# Patient Record
Sex: MALE | Race: WHITE | HISPANIC OR LATINO | Employment: FULL TIME | URBAN - NONMETROPOLITAN AREA
[De-identification: names, ages, dates, MRNs, and addresses within clinical notes are randomized per-mention and may not be internally consistent; named-entity substitution may affect disease eponyms.]

---

## 2019-12-30 ENCOUNTER — APPOINTMENT (INPATIENT)
Dept: ULTRASOUND IMAGING | Facility: HOSPITAL | Age: 39
DRG: 872 | End: 2019-12-30
Payer: COMMERCIAL

## 2019-12-30 ENCOUNTER — HOSPITAL ENCOUNTER (INPATIENT)
Facility: HOSPITAL | Age: 39
LOS: 4 days | Discharge: HOME | DRG: 872 | End: 2020-01-03
Attending: EMERGENCY MEDICINE | Admitting: FAMILY MEDICINE
Payer: COMMERCIAL

## 2019-12-30 ENCOUNTER — APPOINTMENT (EMERGENCY)
Dept: NON INVASIVE DIAGNOSTICS | Facility: HOSPITAL | Age: 39
DRG: 872 | End: 2019-12-30
Payer: COMMERCIAL

## 2019-12-30 ENCOUNTER — APPOINTMENT (EMERGENCY)
Dept: RADIOLOGY | Facility: HOSPITAL | Age: 39
DRG: 872 | End: 2019-12-30
Payer: COMMERCIAL

## 2019-12-30 DIAGNOSIS — R60.0 LEG EDEMA, RIGHT: ICD-10-CM

## 2019-12-30 DIAGNOSIS — I10 ESSENTIAL HYPERTENSION: ICD-10-CM

## 2019-12-30 DIAGNOSIS — L03.115 CELLULITIS OF RIGHT LOWER EXTREMITY: Primary | ICD-10-CM

## 2019-12-30 PROBLEM — E87.6 HYPOKALEMIA: Status: ACTIVE | Noted: 2019-12-30

## 2019-12-30 PROBLEM — R17 ELEVATED BILIRUBIN: Status: ACTIVE | Noted: 2019-12-30

## 2019-12-30 PROBLEM — A41.9 SEPSIS (HCC): Status: ACTIVE | Noted: 2019-12-30

## 2019-12-30 PROBLEM — E66.9 SUPER OBESITY: Status: ACTIVE | Noted: 2019-12-30

## 2019-12-30 PROBLEM — L03.90 SEPSIS DUE TO CELLULITIS (HCC): Status: ACTIVE | Noted: 2019-12-30

## 2019-12-30 LAB
ALBUMIN SERPL BCP-MCNC: 3.1 G/DL (ref 3.5–5)
ALP SERPL-CCNC: 110 U/L (ref 46–116)
ALT SERPL W P-5'-P-CCNC: 41 U/L (ref 12–78)
ANION GAP SERPL CALCULATED.3IONS-SCNC: 6 MMOL/L (ref 4–13)
AST SERPL W P-5'-P-CCNC: 27 U/L (ref 5–45)
BASOPHILS # BLD AUTO: 0.02 THOUSANDS/ΜL (ref 0–0.1)
BASOPHILS NFR BLD AUTO: 0 % (ref 0–1)
BILIRUB SERPL-MCNC: 1.33 MG/DL (ref 0.2–1)
BUN SERPL-MCNC: 12 MG/DL (ref 5–25)
CALCIUM SERPL-MCNC: 8.8 MG/DL (ref 8.3–10.1)
CHLORIDE SERPL-SCNC: 102 MMOL/L (ref 100–108)
CK SERPL-CCNC: 108 U/L (ref 39–308)
CO2 SERPL-SCNC: 29 MMOL/L (ref 21–32)
CREAT SERPL-MCNC: 1.04 MG/DL (ref 0.6–1.3)
EOSINOPHIL # BLD AUTO: 0.03 THOUSAND/ΜL (ref 0–0.61)
EOSINOPHIL NFR BLD AUTO: 0 % (ref 0–6)
ERYTHROCYTE [DISTWIDTH] IN BLOOD BY AUTOMATED COUNT: 12.6 % (ref 11.6–15.1)
GFR SERPL CREATININE-BSD FRML MDRD: 89 ML/MIN/1.73SQ M
GLUCOSE SERPL-MCNC: 92 MG/DL (ref 65–140)
HCT VFR BLD AUTO: 43.6 % (ref 36.5–49.3)
HGB BLD-MCNC: 14.9 G/DL (ref 12–17)
IMM GRANULOCYTES # BLD AUTO: 0.05 THOUSAND/UL (ref 0–0.2)
IMM GRANULOCYTES NFR BLD AUTO: 1 % (ref 0–2)
LACTATE SERPL-SCNC: 1.1 MMOL/L (ref 0.5–2)
LYMPHOCYTES # BLD AUTO: 1.87 THOUSANDS/ΜL (ref 0.6–4.47)
LYMPHOCYTES NFR BLD AUTO: 17 % (ref 14–44)
MCH RBC QN AUTO: 28.7 PG (ref 26.8–34.3)
MCHC RBC AUTO-ENTMCNC: 34.2 G/DL (ref 31.4–37.4)
MCV RBC AUTO: 84 FL (ref 82–98)
MONOCYTES # BLD AUTO: 1.05 THOUSAND/ΜL (ref 0.17–1.22)
MONOCYTES NFR BLD AUTO: 10 % (ref 4–12)
NEUTROPHILS # BLD AUTO: 8.08 THOUSANDS/ΜL (ref 1.85–7.62)
NEUTS SEG NFR BLD AUTO: 72 % (ref 43–75)
NRBC BLD AUTO-RTO: 0 /100 WBCS
PLATELET # BLD AUTO: 221 THOUSANDS/UL (ref 149–390)
PMV BLD AUTO: 11.5 FL (ref 8.9–12.7)
POTASSIUM SERPL-SCNC: 3.3 MMOL/L (ref 3.5–5.3)
PROT SERPL-MCNC: 8 G/DL (ref 6.4–8.2)
RBC # BLD AUTO: 5.2 MILLION/UL (ref 3.88–5.62)
SODIUM SERPL-SCNC: 137 MMOL/L (ref 136–145)
WBC # BLD AUTO: 11.1 THOUSAND/UL (ref 4.31–10.16)

## 2019-12-30 PROCEDURE — 73590 X-RAY EXAM OF LOWER LEG: CPT

## 2019-12-30 PROCEDURE — 87070 CULTURE OTHR SPECIMN AEROBIC: CPT | Performed by: PHYSICIAN ASSISTANT

## 2019-12-30 PROCEDURE — 83605 ASSAY OF LACTIC ACID: CPT | Performed by: FAMILY MEDICINE

## 2019-12-30 PROCEDURE — 80074 ACUTE HEPATITIS PANEL: CPT | Performed by: FAMILY MEDICINE

## 2019-12-30 PROCEDURE — 96365 THER/PROPH/DIAG IV INF INIT: CPT

## 2019-12-30 PROCEDURE — 82550 ASSAY OF CK (CPK): CPT | Performed by: FAMILY MEDICINE

## 2019-12-30 PROCEDURE — 36415 COLL VENOUS BLD VENIPUNCTURE: CPT | Performed by: PHYSICIAN ASSISTANT

## 2019-12-30 PROCEDURE — 99223 1ST HOSP IP/OBS HIGH 75: CPT | Performed by: FAMILY MEDICINE

## 2019-12-30 PROCEDURE — 90471 IMMUNIZATION ADMIN: CPT | Performed by: FAMILY MEDICINE

## 2019-12-30 PROCEDURE — 99285 EMERGENCY DEPT VISIT HI MDM: CPT | Performed by: PHYSICIAN ASSISTANT

## 2019-12-30 PROCEDURE — 87205 SMEAR GRAM STAIN: CPT | Performed by: PHYSICIAN ASSISTANT

## 2019-12-30 PROCEDURE — 93971 EXTREMITY STUDY: CPT

## 2019-12-30 PROCEDURE — 90686 IIV4 VACC NO PRSV 0.5 ML IM: CPT | Performed by: FAMILY MEDICINE

## 2019-12-30 PROCEDURE — 99285 EMERGENCY DEPT VISIT HI MDM: CPT

## 2019-12-30 PROCEDURE — 85025 COMPLETE CBC W/AUTO DIFF WBC: CPT | Performed by: PHYSICIAN ASSISTANT

## 2019-12-30 PROCEDURE — 80053 COMPREHEN METABOLIC PANEL: CPT | Performed by: PHYSICIAN ASSISTANT

## 2019-12-30 PROCEDURE — 76705 ECHO EXAM OF ABDOMEN: CPT

## 2019-12-30 PROCEDURE — 93971 EXTREMITY STUDY: CPT | Performed by: SURGERY

## 2019-12-30 RX ORDER — SENNOSIDES 8.6 MG
1 TABLET ORAL DAILY
Status: DISCONTINUED | OUTPATIENT
Start: 2019-12-31 | End: 2020-01-03 | Stop reason: HOSPADM

## 2019-12-30 RX ORDER — HYDROCHLOROTHIAZIDE 25 MG/1
25 TABLET ORAL DAILY
Status: DISCONTINUED | OUTPATIENT
Start: 2019-12-31 | End: 2020-01-03 | Stop reason: HOSPADM

## 2019-12-30 RX ORDER — CALCIUM CARBONATE 200(500)MG
1000 TABLET,CHEWABLE ORAL DAILY PRN
Status: DISCONTINUED | OUTPATIENT
Start: 2019-12-30 | End: 2020-01-03 | Stop reason: HOSPADM

## 2019-12-30 RX ORDER — CLONIDINE HYDROCHLORIDE 0.1 MG/1
0.2 TABLET ORAL ONCE
Status: COMPLETED | OUTPATIENT
Start: 2019-12-30 | End: 2019-12-30

## 2019-12-30 RX ORDER — POTASSIUM CHLORIDE 20 MEQ/1
20 TABLET, EXTENDED RELEASE ORAL ONCE
Status: COMPLETED | OUTPATIENT
Start: 2019-12-30 | End: 2019-12-30

## 2019-12-30 RX ORDER — ACETAMINOPHEN 325 MG/1
650 TABLET ORAL EVERY 6 HOURS PRN
Status: DISCONTINUED | OUTPATIENT
Start: 2019-12-30 | End: 2019-12-31

## 2019-12-30 RX ORDER — ONDANSETRON 2 MG/ML
4 INJECTION INTRAMUSCULAR; INTRAVENOUS EVERY 6 HOURS PRN
Status: DISCONTINUED | OUTPATIENT
Start: 2019-12-30 | End: 2020-01-03 | Stop reason: HOSPADM

## 2019-12-30 RX ORDER — CEPHALEXIN 500 MG/1
500 CAPSULE ORAL 4 TIMES DAILY
Status: ON HOLD | COMMUNITY
Start: 2019-12-28 | End: 2020-01-03 | Stop reason: SDUPTHER

## 2019-12-30 RX ORDER — DOCUSATE SODIUM 100 MG/1
100 CAPSULE, LIQUID FILLED ORAL 2 TIMES DAILY
Status: DISCONTINUED | OUTPATIENT
Start: 2019-12-30 | End: 2020-01-03 | Stop reason: HOSPADM

## 2019-12-30 RX ADMIN — INFLUENZA VIRUS VACCINE 0.5 ML: 15; 15; 15; 15 SUSPENSION INTRAMUSCULAR at 21:01

## 2019-12-30 RX ADMIN — VANCOMYCIN HYDROCHLORIDE 1500 MG: 5 INJECTION, POWDER, LYOPHILIZED, FOR SOLUTION INTRAVENOUS at 13:42

## 2019-12-30 RX ADMIN — POTASSIUM CHLORIDE 20 MEQ: 1500 TABLET, EXTENDED RELEASE ORAL at 15:33

## 2019-12-30 RX ADMIN — CLONIDINE HYDROCHLORIDE 0.2 MG: 0.1 TABLET ORAL at 19:31

## 2019-12-30 NOTE — ASSESSMENT & PLAN NOTE
Outpatient treatment failed with Keflex  Progressing quickly  Continue vancomycin  Follow-up pharmacy consult for vanc trough  Follow-up blood culture, wound culture  WBC is minimally elevated  Follow lactic acid, CPK level  Follow wound care management

## 2019-12-30 NOTE — ED PROVIDER NOTES
History  Chief Complaint   Patient presents with    Cellulitis     Pt states since friday he noticed R sided calf pain/redness/swelling, pt had N/V/D and went to [facility] where the pt tested negative for flu however was dx with cellulitis in the R calf and was perscribed abx  Pt states he has been taking abx however no improvement  69-year-old male with a history of hypertension presents to the emergency department for evaluation of right lower extremity swelling and pain  The patient reports that his symptoms began over the past 3-4 days  He was seen in urgent care on Saturday for flu-like symptoms and was also noted to have a area of redness on the right lower extremity  They placed him on Keflex for suspected cellulitis and sent him home  The patient returns to the ED today stating that the symptoms have only worsened  He reports worsening redness and pain of the entire lower leg, and has also noticed some drainage as well  He reports having a fever this morning of 102 F, and he did take Motrin prior to coming to the emergency department  He denies any nausea, vomiting, abdominal pain, chills, night sweats, headaches, chest pain, shortness of breath, or any other acute complaints  He denies any recent travel or surgeries, and has no history of blood clots  He denies any injuries or falls prior to the onset of his symptoms  He has no other questions or concerns at this time  Prior to Admission Medications   Prescriptions Last Dose Informant Patient Reported? Taking? cephalexin (KEFLEX) 500 mg capsule 12/30/2019 at Unknown time  Yes Yes   Sig: Take 500 mg by mouth 4 (four) times a day      Facility-Administered Medications: None       Past Medical History:   Diagnosis Date    Hypertension        No past surgical history on file  No family history on file  I have reviewed and agree with the history as documented      Social History     Tobacco Use    Smoking status: Never Smoker  Smokeless tobacco: Never Used   Substance Use Topics    Alcohol use: Not Currently    Drug use: Not Currently        Review of Systems   Constitutional: Positive for fever  Negative for chills  HENT: Negative for congestion and sore throat  Eyes: Negative for photophobia and visual disturbance  Respiratory: Negative for cough and shortness of breath  Cardiovascular: Positive for leg swelling  Negative for chest pain and palpitations  Gastrointestinal: Negative for abdominal pain, nausea and vomiting  Genitourinary: Negative for difficulty urinating, dysuria and hematuria  Musculoskeletal: Negative for arthralgias and back pain  Skin: Positive for color change  Negative for rash and wound  Neurological: Negative for light-headedness and headaches  All other systems reviewed and are negative  Physical Exam  Physical Exam   Constitutional: He appears well-developed and well-nourished  He appears distressed (Mild distress)  HENT:   Head: Normocephalic and atraumatic  Mouth/Throat: Oropharynx is clear and moist    Eyes: Pupils are equal, round, and reactive to light  EOM are normal    Neck: Normal range of motion  Neck supple  Cardiovascular: Normal rate and regular rhythm  No murmur heard  Pulmonary/Chest: Effort normal and breath sounds normal  He has no wheezes  He has no rhonchi  He has no rales  Abdominal: Soft  Normal appearance and bowel sounds are normal  There is no tenderness  Musculoskeletal: Normal range of motion  He exhibits no deformity  Neurological: He is alert  No cranial nerve deficit  Skin: Skin is warm and dry  Capillary refill takes less than 2 seconds  There is a dusky red discoloration of the majority of the right lower extremity between the ankle and knee  There are 2 blisters draining serosanguineous fluid  There is associated erythema consistent with cellulitis, which is warm to touch extending up the medial thigh  Pedal pulses are 2+  Sensation intact  Psychiatric: He has a normal mood and affect  His behavior is normal        Vital Signs  ED Triage Vitals [12/30/19 1159]   Temperature Pulse Respirations Blood Pressure SpO2   97 8 °F (36 6 °C) (!) 107 18 (!) 189/108 98 %      Temp Source Heart Rate Source Patient Position - Orthostatic VS BP Location FiO2 (%)   Oral Monitor Lying Right arm --      Pain Score       Worst Possible Pain           Vitals:    12/30/19 1230 12/30/19 1330 12/30/19 1430 12/30/19 1706   BP: (!) 172/98 (!) 178/100 (!) 179/102 151/94   Pulse: 92 97 91 98   Patient Position - Orthostatic VS: Lying Lying Lying          Visual Acuity      ED Medications  Medications   vancomycin (VANCOCIN) 1,500 mg in sodium chloride 0 9 % 250 mL IVPB (0 mg Intravenous Stopped 12/30/19 1555)   potassium chloride (K-DUR,KLOR-CON) CR tablet 20 mEq (20 mEq Oral Given 12/30/19 1533)       Diagnostic Studies  Results Reviewed     Procedure Component Value Units Date/Time    Lactic acid, plasma [[id number]]  (Normal) Collected:  12/30/19 1541    Lab Status:  Final result Specimen:  Blood from Arm, Left Updated:  12/30/19 1609     LACTIC ACID 1 1 mmol/L     Narrative:       Result may be elevated if tourniquet was used during collection  Anaerobic culture and Gram stain [[id number]] Collected:  12/30/19 1543    Lab Status: In process Specimen:  Tissue from Wound Updated:  12/30/19 1547    Wound culture and Gram stain [[id number]] Collected:  12/30/19 1543    Lab Status: In process Specimen:  Wound from Leg, Right Updated:  12/30/19 1547    Hepatitis panel, acute [[id number]] Collected:  12/30/19 1541    Lab Status:   In process Specimen:  Blood from Arm, Left Updated:  12/30/19 1546    CK (with reflex to MB) [[id number]]  (Normal) Collected:  12/30/19 1341    Lab Status:  Final result Specimen:  Blood from Arm, Left Updated:  12/30/19 1516     Total  U/L     Comprehensive metabolic panel [[id number]]  (Abnormal) Collected:  12/30/19 1341    Lab Status:  Final result Specimen:  Blood from Arm, Left Updated:  12/30/19 1405     Sodium 137 mmol/L      Potassium 3 3 mmol/L      Chloride 102 mmol/L      CO2 29 mmol/L      ANION GAP 6 mmol/L      BUN 12 mg/dL      Creatinine 1 04 mg/dL      Glucose 92 mg/dL      Calcium 8 8 mg/dL      AST 27 U/L      ALT 41 U/L      Alkaline Phosphatase 110 U/L      Total Protein 8 0 g/dL      Albumin 3 1 g/dL      Total Bilirubin 1 33 mg/dL      eGFR 89 ml/min/1 73sq m     Narrative:       National Kidney Disease Foundation guidelines for Chronic Kidney Disease (CKD):     Stage 1 with normal or high GFR (GFR > 90 mL/min/1 73 square meters)    Stage 2 Mild CKD (GFR = 60-89 mL/min/1 73 square meters)    Stage 3A Moderate CKD (GFR = 45-59 mL/min/1 73 square meters)    Stage 3B Moderate CKD (GFR = 30-44 mL/min/1 73 square meters)    Stage 4 Severe CKD (GFR = 15-29 mL/min/1 73 square meters)    Stage 5 End Stage CKD (GFR <15 mL/min/1 73 square meters)  Note: GFR calculation is accurate only with a steady state creatinine    CBC and differential [[id number]]  (Abnormal) Collected:  12/30/19 1341    Lab Status:  Final result Specimen:  Blood from Arm, Left Updated:  12/30/19 1347     WBC 11 10 Thousand/uL      RBC 5 20 Million/uL      Hemoglobin 14 9 g/dL      Hematocrit 43 6 %      MCV 84 fL      MCH 28 7 pg      MCHC 34 2 g/dL      RDW 12 6 %      MPV 11 5 fL      Platelets 703 Thousands/uL      nRBC 0 /100 WBCs      Neutrophils Relative 72 %      Immat GRANS % 1 %      Lymphocytes Relative 17 %      Monocytes Relative 10 %      Eosinophils Relative 0 %      Basophils Relative 0 %      Neutrophils Absolute 8 08 Thousands/µL      Immature Grans Absolute 0 05 Thousand/uL      Lymphocytes Absolute 1 87 Thousands/µL      Monocytes Absolute 1 05 Thousand/µL      Eosinophils Absolute 0 03 Thousand/µL      Basophils Absolute 0 02 Thousands/µL                  US abdomen limited   Final Result by Ilya Person MD (12/30 1730)   1  Hepatic steatosis  2   Normal gallbladder  Workstation performed: [id number]         VAS lower limb venous duplex study, unilateral/limited   Final Result by Hugh Johnson MD (12/30 0445)      XR tibia fibula 2 views RIGHT   Final Result by Jaziel Ashby MD (12/30 1526)      No acute osseous abnormality  Workstation performed: [id number]                    Procedures  Procedures         ED Course  ED Course as of Dec 30 1819   Mon Dec 30, 2019   1 19-year-old male presents with the above HPI  He was placed on Keflex 2 days ago for suspected cellulitis  His lower extremity has a dusky red appearance and there are blisters with serosanguineous drainage  Symptoms were concerning for possible underlying DVT versus fracture  Ultrasound however was negative  X-ray was negative for any fracture and there was no free air visible  Waiting on labs  1331 The concern at this time is cellulitis that failed outpatient therapy with Keflex p o  Waiting on labs and will plan for admission or IV antibiotics      1407 Mild leukocytosis on CBC  CMP is grossly unremarkable  Hollister text sent to the hospitalist for admission for cellulitis of the lower extremity that failed outpatient treatment      [id number] will evaluate the patient       [phone] in agreement with admission  Patient in agreement with this plan as well    Patient admitted to the hospitalist service for IV antibiotics                                  MDM  Number of Diagnoses or Management Options  Cellulitis of right lower extremity: new and requires workup     Amount and/or Complexity of Data Reviewed  Clinical lab tests: ordered and reviewed  Tests in the radiology section of CPT®: ordered and reviewed  Tests in the medicine section of CPT®: ordered and reviewed  Discussion of test results with the performing providers: yes  Decide to obtain previous medical records or to obtain history from someone other than the patient: yes  Obtain history from someone other than the patient: yes  Review and summarize past medical records: yes  Independent visualization of images, tracings, or specimens: yes    Risk of Complications, Morbidity, and/or Mortality  Presenting problems: moderate  Diagnostic procedures: low  Management options: low    Patient Progress  Patient progress: stable        Disposition  Final diagnoses:   Cellulitis of right lower extremity     Time reflects when diagnosis was documented in both MDM as applicable and the Disposition within this note     Time User Action Codes Description Comment    12/30/2019  1:07 PM Lina Nunez Add [R60 0] Leg edema, right     12/30/2019  2:32 PM Liseth Thao Add [L03 115] Cellulitis of right lower extremity       ED Disposition     ED Disposition Condition Date/Time Comment    Admit Stable Mon Dec 30, 2019  2:31 PM Case was discussed with Dr Estefani Mcginnis and the patient's admission status was agreed to be Admission Status: inpatient status to the service of Dr Estefani Mcginnis          Follow-up Information    None         Patient's Medications   Discharge Prescriptions    No medications on file     No discharge procedures on file      ED Provider  Electronically Signed by           Libra Pruitt PA-C  12/30/19 0285

## 2019-12-30 NOTE — H&P
H&P- Sue Ahumada 1980, 35 y o  male MRN: [id number]    Unit/Bed#: ED 12 Encounter: [id number]    Primary Care Provider: No primary care provider on file  Date and time admitted to hospital: 12/30/2019 11:54 AM        * Cellulitis of right lower extremity  Assessment & Plan  Outpatient treatment failed with Keflex  Progressing quickly  Continue vancomycin  Follow-up pharmacy consult for vanc trough  Follow-up blood culture, wound culture  WBC is minimally elevated  Follow lactic acid, CPK level  Follow wound care management    Super obesity  Assessment & Plan  Low fat low salt diet  Nutritional consult    Essential hypertension  Assessment & Plan  Uncontrol  Asymptomatic  Patient received One dose Clonidine at ER  Continue HCTZ  Low sodium diet    Elevated bilirubin  Assessment & Plan  Unknown etiology  Follow Hepatitis panel  Follow US abdomen      Hypokalemia  Assessment & Plan  Potassium 3 3  Will supplement  Follow am Labs    Sepsis due to cellulitis   Elevated wbc   Rise of temperature at home   Continue antibiotics      VTE Prophylaxis: Enoxaparin (Lovenox)  / sequential compression device   Code Status: Full code    Discussion with family: Wife -Janell    Anticipated Length of Stay:  Patient will be admitted on an Inpatient basis with an anticipated length of stay of  > 2 midnights  Justification for Hospital Stay: cellulitis requiring IV antibiotics    Total Time for Visit, including Counseling / Coordination of Care: 45 minutes  Greater than 50% of this total time spent on direct patient counseling and coordination of care  Chief Complaint:   Leg pain, swelling and fever    History of Present Illness:    Sue Ahumada is a 35 y o  male who presents with leg pain, swelling and redness  Complaining this symptoms started few days ago, went to see urgent care prescribed Keflex  Patient reports after taking few medication for last 2-3 days is not helping rather is progressing    Patient reports the redness is getting worse, later on he noticed a blister on leg which is wheezing  Also had fever 102 at home  Denies any nausea, vomiting, constipation  Review of Systems:    Review of Systems   Constitutional: Positive for activity change and fever  Negative for chills, diaphoresis and unexpected weight change  HENT: Negative for congestion, postnasal drip, rhinorrhea, sinus pressure, sinus pain, sneezing, sore throat, tinnitus and trouble swallowing  Eyes: Negative for photophobia, redness and visual disturbance  Respiratory: Negative for apnea, cough, choking, shortness of breath, wheezing and stridor  Cardiovascular: Negative for chest pain, palpitations and leg swelling  Gastrointestinal: Negative for abdominal distention, abdominal pain, anal bleeding, constipation, diarrhea and nausea  Genitourinary: Negative for difficulty urinating, dysuria, enuresis, frequency and hematuria  Musculoskeletal: Positive for myalgias  Negative for arthralgias, back pain, gait problem, joint swelling and neck stiffness  Skin: Positive for color change, rash and wound  Neurological: Negative for dizziness, tremors, seizures, syncope, facial asymmetry, speech difficulty, light-headedness, numbness and headaches  Hematological: Negative for adenopathy  Psychiatric/Behavioral: Negative for agitation, behavioral problems, decreased concentration and dysphoric mood  All other systems reviewed and are negative  Past Medical and Surgical History:     Past Medical History:   Diagnosis Date    Hypertension        No past surgical history on file  Meds/Allergies:    Prior to Admission medications    Medication Sig Start Date End Date Taking? Authorizing Provider   cephalexin (KEFLEX) 500 mg capsule Take 500 mg by mouth 4 (four) times a day 12/28/19 1/7/20 Yes Historical Provider, MD     I have reviewed home medications with patient personally      Allergies: No Known Allergies    Social History:     Marital Status: Single   Occupation: employed  Patient Pre-hospital Living Situation: home  Patient Pre-hospital Level of Mobility: independent  Patient Pre-hospital Diet Restrictions: none  Substance Use History:   Social History     Substance and Sexual Activity   Alcohol Use Not Currently     Social History     Tobacco Use   Smoking Status Never Smoker   Smokeless Tobacco Never Used     Social History     Substance and Sexual Activity   Drug Use Not Currently       Family History:    Father: HTN,Heart disease, Mother=Diabetes    Physical Exam:     Vitals:   Blood Pressure: (!) 179/102 (12/30/19 1430)  Pulse: 91 (12/30/19 1430)  Temperature: 97 8 °F (36 6 °C) (12/30/19 1159)  Temp Source: Oral (12/30/19 1159)  Respirations: 18 (12/30/19 1430)  Height: 5' 8" (172 7 cm) (12/30/19 1159)  Weight - Scale: (!) 158 kg (348 lb 1 7 oz) (12/30/19 1159)  SpO2: 99 % (12/30/19 1430)    Physical Exam   Constitutional: He is oriented to person, place, and time  He appears well-developed  No distress  HENT:   Mouth/Throat: No oropharyngeal exudate  Eyes: Pupils are equal, round, and reactive to light  EOM are normal    Neck: Normal range of motion  No JVD present  No tracheal deviation present  No thyromegaly present  Cardiovascular: Normal rate, regular rhythm and normal heart sounds  Exam reveals no gallop and no friction rub  No murmur heard  Pulmonary/Chest: Effort normal and breath sounds normal  No stridor  No respiratory distress  He has no rales  He exhibits no tenderness  Abdominal: Soft  Bowel sounds are normal  He exhibits no distension and no mass  There is no rebound and no guarding  No hernia  Musculoskeletal: Normal range of motion  He exhibits edema (right leg) and tenderness  Legs:  Neurological: He is alert and oriented to person, place, and time  He displays normal reflexes  No cranial nerve deficit  He exhibits normal muscle tone  Coordination normal    Skin: Skin is warm  Capillary refill takes less than 2 seconds  There is erythema  Psychiatric: He has a normal mood and affect  His behavior is normal    Nursing note and vitals reviewed  Additional Data:     Lab Results: I have personally reviewed pertinent reports  Results from last 7 days   Lab Units 12/30/19  1341   WBC Thousand/uL 11 10*   HEMOGLOBIN g/dL 14 9   HEMATOCRIT % 43 6   PLATELETS Thousands/uL 221   NEUTROS PCT % 72   LYMPHS PCT % 17   MONOS PCT % 10   EOS PCT % 0     Results from last 7 days   Lab Units 12/30/19  1341   SODIUM mmol/L 137   POTASSIUM mmol/L 3 3*   CHLORIDE mmol/L 102   CO2 mmol/L 29   BUN mg/dL 12   CREATININE mg/dL 1 04   ANION GAP mmol/L 6   CALCIUM mg/dL 8 8   ALBUMIN g/dL 3 1*   TOTAL BILIRUBIN mg/dL 1 33*   ALK PHOS U/L 110   ALT U/L 41   AST U/L 27   GLUCOSE RANDOM mg/dL 92                       Imaging: I have personally reviewed pertinent reports  XR tibia fibula 2 views RIGHT    (Results Pending)   VAS lower limb venous duplex study, unilateral/limited    (Results Pending)       EKG, Pathology, and Other Studies Reviewed on Admission:   · EKG: reviewed    Allscripts / Epic Records Reviewed: Yes     ** Please Note: This note has been constructed using a voice recognition system   ** None

## 2019-12-30 NOTE — LETTER
[facility]  [address]  [facility] [address]  Dept: [phone]    January 3, 2020     Patient: Brianna Rodriguez   YOB: 1980   Date of Visit: 12/30/2019       To Whom it May Concern:    Brianna Rodriguez is under my professional care  He was seen in the hospital from 12/30/2019   to 01/03/20  He may return to work on 1/6/2020 without limitations  If you have any questions or concerns, please don't hesitate to call           Sincerely,          Dela Rubinstein, MD

## 2019-12-31 LAB
ALBUMIN SERPL BCP-MCNC: 2.5 G/DL (ref 3.5–5)
ALP SERPL-CCNC: 92 U/L (ref 46–116)
ALT SERPL W P-5'-P-CCNC: 32 U/L (ref 12–78)
ANION GAP SERPL CALCULATED.3IONS-SCNC: 8 MMOL/L (ref 4–13)
AST SERPL W P-5'-P-CCNC: 20 U/L (ref 5–45)
BASOPHILS # BLD AUTO: 0.03 THOUSANDS/ΜL (ref 0–0.1)
BASOPHILS NFR BLD AUTO: 0 % (ref 0–1)
BILIRUB SERPL-MCNC: 1.01 MG/DL (ref 0.2–1)
BUN SERPL-MCNC: 11 MG/DL (ref 5–25)
CALCIUM SERPL-MCNC: 8.2 MG/DL (ref 8.3–10.1)
CHLORIDE SERPL-SCNC: 103 MMOL/L (ref 100–108)
CO2 SERPL-SCNC: 26 MMOL/L (ref 21–32)
CREAT SERPL-MCNC: 0.87 MG/DL (ref 0.6–1.3)
EOSINOPHIL # BLD AUTO: 0.19 THOUSAND/ΜL (ref 0–0.61)
EOSINOPHIL NFR BLD AUTO: 2 % (ref 0–6)
ERYTHROCYTE [DISTWIDTH] IN BLOOD BY AUTOMATED COUNT: 12.6 % (ref 11.6–15.1)
GFR SERPL CREATININE-BSD FRML MDRD: 108 ML/MIN/1.73SQ M
GLUCOSE SERPL-MCNC: 96 MG/DL (ref 65–140)
HAV IGM SER QL: NORMAL
HBV CORE IGM SER QL: NORMAL
HBV SURFACE AG SER QL: NORMAL
HCT VFR BLD AUTO: 39.4 % (ref 36.5–49.3)
HCV AB SER QL: NORMAL
HGB BLD-MCNC: 13.4 G/DL (ref 12–17)
IMM GRANULOCYTES # BLD AUTO: 0.04 THOUSAND/UL (ref 0–0.2)
IMM GRANULOCYTES NFR BLD AUTO: 0 % (ref 0–2)
LYMPHOCYTES # BLD AUTO: 2.48 THOUSANDS/ΜL (ref 0.6–4.47)
LYMPHOCYTES NFR BLD AUTO: 24 % (ref 14–44)
MCH RBC QN AUTO: 28.8 PG (ref 26.8–34.3)
MCHC RBC AUTO-ENTMCNC: 34 G/DL (ref 31.4–37.4)
MCV RBC AUTO: 85 FL (ref 82–98)
MONOCYTES # BLD AUTO: 1.06 THOUSAND/ΜL (ref 0.17–1.22)
MONOCYTES NFR BLD AUTO: 10 % (ref 4–12)
NEUTROPHILS # BLD AUTO: 6.38 THOUSANDS/ΜL (ref 1.85–7.62)
NEUTS SEG NFR BLD AUTO: 64 % (ref 43–75)
NRBC BLD AUTO-RTO: 0 /100 WBCS
PLATELET # BLD AUTO: 229 THOUSANDS/UL (ref 149–390)
PMV BLD AUTO: 10.9 FL (ref 8.9–12.7)
POTASSIUM SERPL-SCNC: 3.5 MMOL/L (ref 3.5–5.3)
PROT SERPL-MCNC: 6.9 G/DL (ref 6.4–8.2)
RBC # BLD AUTO: 4.65 MILLION/UL (ref 3.88–5.62)
SODIUM SERPL-SCNC: 137 MMOL/L (ref 136–145)
WBC # BLD AUTO: 10.18 THOUSAND/UL (ref 4.31–10.16)

## 2019-12-31 PROCEDURE — 99232 SBSQ HOSP IP/OBS MODERATE 35: CPT | Performed by: FAMILY MEDICINE

## 2019-12-31 PROCEDURE — G8987 SELF CARE CURRENT STATUS: HCPCS

## 2019-12-31 PROCEDURE — G8980 MOBILITY D/C STATUS: HCPCS

## 2019-12-31 PROCEDURE — 85025 COMPLETE CBC W/AUTO DIFF WBC: CPT | Performed by: FAMILY MEDICINE

## 2019-12-31 PROCEDURE — G8979 MOBILITY GOAL STATUS: HCPCS

## 2019-12-31 PROCEDURE — 97162 PT EVAL MOD COMPLEX 30 MIN: CPT

## 2019-12-31 PROCEDURE — G8989 SELF CARE D/C STATUS: HCPCS

## 2019-12-31 PROCEDURE — G8978 MOBILITY CURRENT STATUS: HCPCS

## 2019-12-31 PROCEDURE — G8988 SELF CARE GOAL STATUS: HCPCS

## 2019-12-31 PROCEDURE — 97166 OT EVAL MOD COMPLEX 45 MIN: CPT

## 2019-12-31 PROCEDURE — 80053 COMPREHEN METABOLIC PANEL: CPT | Performed by: FAMILY MEDICINE

## 2019-12-31 RX ORDER — OXYCODONE HYDROCHLORIDE 10 MG/1
10 TABLET ORAL EVERY 4 HOURS PRN
Status: DISCONTINUED | OUTPATIENT
Start: 2019-12-31 | End: 2020-01-03 | Stop reason: HOSPADM

## 2019-12-31 RX ORDER — OXYCODONE HYDROCHLORIDE 5 MG/1
5 TABLET ORAL EVERY 4 HOURS PRN
Status: DISCONTINUED | OUTPATIENT
Start: 2019-12-31 | End: 2020-01-03 | Stop reason: HOSPADM

## 2019-12-31 RX ORDER — ACETAMINOPHEN 325 MG/1
650 TABLET ORAL EVERY 6 HOURS PRN
Status: DISCONTINUED | OUTPATIENT
Start: 2019-12-31 | End: 2020-01-03 | Stop reason: HOSPADM

## 2019-12-31 RX ORDER — HYDROMORPHONE HCL/PF 1 MG/ML
0.5 SYRINGE (ML) INJECTION
Status: DISCONTINUED | OUTPATIENT
Start: 2019-12-31 | End: 2020-01-03 | Stop reason: HOSPADM

## 2019-12-31 RX ADMIN — VANCOMYCIN HYDROCHLORIDE 1500 MG: 1 INJECTION, POWDER, LYOPHILIZED, FOR SOLUTION INTRAVENOUS at 02:11

## 2019-12-31 RX ADMIN — OXYCODONE HYDROCHLORIDE 10 MG: 10 TABLET ORAL at 18:23

## 2019-12-31 RX ADMIN — VANCOMYCIN HYDROCHLORIDE 1500 MG: 1 INJECTION, POWDER, LYOPHILIZED, FOR SOLUTION INTRAVENOUS at 19:20

## 2019-12-31 RX ADMIN — HYDROCHLOROTHIAZIDE 25 MG: 25 TABLET ORAL at 09:08

## 2019-12-31 RX ADMIN — OXYCODONE HYDROCHLORIDE 10 MG: 10 TABLET ORAL at 06:36

## 2019-12-31 RX ADMIN — VANCOMYCIN HYDROCHLORIDE 1500 MG: 1 INJECTION, POWDER, LYOPHILIZED, FOR SOLUTION INTRAVENOUS at 11:36

## 2019-12-31 RX ADMIN — OXYCODONE HYDROCHLORIDE 5 MG: 5 TABLET ORAL at 11:39

## 2019-12-31 RX ADMIN — ENOXAPARIN SODIUM 40 MG: 40 INJECTION SUBCUTANEOUS at 09:08

## 2019-12-31 RX ADMIN — ACETAMINOPHEN 650 MG: 325 TABLET ORAL at 16:26

## 2019-12-31 NOTE — UTILIZATION REVIEW
Initial Clinical Review    Admission: Date/Time/Statement: Inpatient Admission Orders (From admission, onward)     Ordered        12/30/19 1432  Inpatient Admission  Once                   Orders Placed This Encounter   Procedures    Inpatient Admission     Standing Status:   Standing     Number of Occurrences:   1     Order Specific Question:   Admitting Physician     Answer:   Simon Estevez [[id number]]     Order Specific Question:   Level of Care     Answer:   Med Surg [[id number]]     Order Specific Question:   Estimated length of stay     Answer:   More than 2 Midnights     Order Specific Question:   Certification     Answer:   I certify that inpatient services are medically necessary for this patient for a duration of greater than two midnights  See H&P and MD Progress Notes for additional information about the patient's course of treatment  ED Arrival Information     Expected Arrival Acuity Means of Arrival Escorted By Service Admission Type    - 12/30/2019 11:49 Urgent Walk-In Self Hospitalist Urgent    Arrival Complaint    Leg rash        Chief Complaint   Patient presents with    Cellulitis     Pt states since friday he noticed R sided calf pain/redness/swelling, pt had N/V/D and went to [facility] where the pt tested negative for flu however was dx with cellulitis in the R calf and was perscribed abx  Pt states he has been taking abx however no improvement  Assessment/Plan: 35year old male to the ED from home with complaints of right lower extremity swelling and pain for 3-4 days  Seen at urgent care 3 days prior, diagnosed with cellulitis of that extremity, prescribed KEflex which he has been taking and returns with worsening redniess and pin of entire loer leg, fever of 102 on day of arrival   Admitted to inpatient for cellulitis of right lower extremity, hypertention, hypokalemia     He has dusky red discoloration of the majority of right lower extremity between the ankle na knee with 2 blisters draining serosanguineous fluids and associated erythema consistent with cellulitis which is warm to touch  Pedal pulses are 2+  IV antibiotics initiated for cellulitis, failed ouptatient therapy  US - for DVT  Supplement potassium and recheck  WOund care consult  Wound care consult 12/31:  Right lower extremity: skin is dark red/purple and hot to touch in comparison to the LLE  There is a bullae present on the medial aspect of the leg just above the malleolus that is draining but still has large amount of fluctuance and roof is still mostly intact  A small satellite lesion that is draining to the left of the bullae is present  Drainage is small to moderate amount of serous fluid  Irrigate with normal saline, pat dry, use adaptic over blister, cover with maxorb             ED Triage Vitals [12/30/19 1159]   Temperature Pulse Respirations Blood Pressure SpO2   97 8 °F (36 6 °C) (!) 107 18 (!) 189/108 98 %      Temp Source Heart Rate Source Patient Position - Orthostatic VS BP Location FiO2 (%)   Oral Monitor Lying Right arm --      Pain Score       Worst Possible Pain        Wt Readings from Last 1 Encounters:   12/31/19 (!) 160 kg (351 lb 13 7 oz)     Additional Vital Signs:   Date/Time Temp Pulse Resp BP MAP (mmHg) SpO2 O2 Device   12/31/19 0726 97 9 °F (36 6 °C) 75 17 132/74 -- 98 % None (Room air)   12/31/19 0310 -- -- -- -- -- -- None (Room air)   12/30/19 23:56:41 98 8 °F (37 1 °C) 82 18 136/86 103 99 % --   12/30/19 1905 -- -- -- 160/72 -- -- --   12/30/19 1841 99 9 °F (37 7 °C) 101 22 154/109Abnormal  124 98 % --   12/30/19 1706 99 7 °F (37 6 °C) 98 20 151/94 -- 100 % None (Room air)   12/30/19 1430 -- 91 18 179/102Abnormal  -- 99 % None (Room air)   12/30/19 1330 -- 97 18 178/100Abnormal  -- 99 % None (Room air)   12/30/19 1230 -- 92 18 172/98Abnormal  -- 100 % None (Room air)   12/30/19 1159 97 8 °F (36 6 °C) 107Abnormal  18 189/108Abnormal         Pertinent Labs/Diagnostic Test Results:   US abdomen 12/30:  Hepatic steatosis  Normal gallbladder  XRay tib/fib 12/30: No acute osseous abnormality  VAS lower limb venous duplex 12/30:  CONCLUSION:     Impression:  RIGHT LOWER LIMB  No evidence of acute or chronic deep vein thrombosis   No evidence of superficial thrombophlebitis noted  Doppler evaluation shows a normal response to augmentation maneuvers  Popliteal, posterior tibial and anterior tibial arterial Doppler waveforms are  triphasic  Tech Note:  There is an echogenic structure located in the inguinal region  This structure  measures approximately 1 21 x 2 18 cm and is consistent with enlarged lymph node  and channels  LEFT LOWER LIMB LIMITED  Evaluation shows no evidence of thrombus in the common femoral vein  Doppler evaluation shows a normal response to augmentation maneuvers    Results from last 7 days   Lab Units 12/31/19  0512 12/30/19  1341   WBC Thousand/uL 10 18* 11 10*   HEMOGLOBIN g/dL 13 4 14 9   HEMATOCRIT % 39 4 43 6   PLATELETS Thousands/uL 229 221   NEUTROS ABS Thousands/µL 6 38 8 08*         Results from last 7 days   Lab Units 12/31/19  0512 12/30/19  1341   SODIUM mmol/L 137 137   POTASSIUM mmol/L 3 5 3 3*   CHLORIDE mmol/L 103 102   CO2 mmol/L 26 29   ANION GAP mmol/L 8 6   BUN mg/dL 11 12   CREATININE mg/dL 0 87 1 04   EGFR ml/min/1 73sq m 108 89   CALCIUM mg/dL 8 2* 8 8     Results from last 7 days   Lab Units 12/31/19  0512 12/30/19  1341   AST U/L 20 27   ALT U/L 32 41   ALK PHOS U/L 92 110   TOTAL PROTEIN g/dL 6 9 8 0   ALBUMIN g/dL 2 5* 3 1*   TOTAL BILIRUBIN mg/dL 1 01* 1 33*         Results from last 7 days   Lab Units 12/31/19  0512 12/30/19  1341   GLUCOSE RANDOM mg/dL 96 92       Results from last 7 days   Lab Units 12/30/19  1341   CK TOTAL U/L 108       Results from last 7 days   Lab Units 12/30/19  1541   LACTIC ACID mmol/L 1 1       Results from last 7 days   Lab Units 12/30/19  1541   HEP B S AG  Non-reactive   HEP C AB  Non-reactive   HEP B C IGM  Non-reactive Results from last 7 days   Lab Units 12/30/19  1543   GRAM STAIN RESULT  No Polys or Bacteria seen     ED Treatment:   Medication Administration from 12/30/2019 1148 to 12/30/2019 1835       Date/Time Order Dose Route Action     12/30/2019 1342 vancomycin (VANCOCIN) 1,500 mg in sodium chloride 0 9 % 250 mL IVPB 1,500 mg Intravenous New Bag     12/30/2019 1533 potassium chloride (K-DUR,KLOR-CON) CR tablet 20 mEq 20 mEq Oral Given        Past Medical History:   Diagnosis Date    Hypertension        Admitting Diagnosis: Cellulitis [L03 90]  Cellulitis of right lower extremity [L03 115]  Leg edema, right [R60 0]  Age/Sex: 35 y o  male  Admission Orders:    Scheduled Medications:    Medications:  docusate sodium 100 mg Oral BID   enoxaparin 40 mg Subcutaneous Daily   hydrochlorothiazide 25 mg Oral Daily   senna 1 tablet Oral Daily   vancomycin 15 mg/kg (Adjusted) Intravenous Q8H     Continuous IV Infusions:     PRN Meds:    acetaminophen 650 mg Oral Q6H PRN   calcium carbonate 1,000 mg Oral Daily PRN   HYDROmorphone 0 5 mg Intravenous Q1H PRN   ondansetron 4 mg Intravenous Q6H PRN   oxyCODONE 10 mg Oral Q4H PRNx1   oxyCODONE 5 mg Oral Q4H PRN       IP CONSULT TO WOUND CARE  IP CONSULT TO PHARMACY    Network Utilization Review Department  [email or web address]  ATTENTION: Please call with any questions or concerns to [phone] and carefully listen to the prompts so that you are directed to the right person  All voicemails are confidential   Elaine Lemos all requests for admission clinical reviews, approved or denied determinations and any other requests to dedicated fax number below belonging to the campus where the patient is receiving treatment   List of dedicated fax numbers for the Facilities:  [address] DENIALS (Administrative/Medical Necessity) [phone]   [address] (Maternity/NICU/Pediatrics) [phone]   Kevin Bentley [phone] Annette Rubi [phone]   [facility] [phone]   [address] [phone]   [facility]  [phone]   [address] [facility] [address] [facility]  [address] [facility] [address] [facility] [phone]

## 2019-12-31 NOTE — PLAN OF CARE
Problem: PHYSICAL THERAPY ADULT  Goal: Performs mobility at highest level of function for planned discharge setting  See evaluation for individualized goals  Description  D/C PT with no needs anticipated  See flowsheet documentation for full assessment, interventions and recommendations  Outcome: Completed  Note:         Assessment: Pt is a 35 y o  male seen for PT evaluation s/p admission to [address] [place] [address] on 12/30/2019 with Cellulitis of right lower extremity  Pt with PMHx HTN and obesity  WBC count 10 18, potassium 3 3, Abdominal US showing hepatic steatosis  (-) DVT  Order placed for PT services  Upon evaluation: Pt is presenting at an Independent level for functional mobility despite increased c/o pain  He ambulates with antalgic gait pattern associated with increased pain, but doesn't limit his ability to ambulate or complete stairs to acces s home environment  He appears to be at his PLOF of (I) at this time with no acute care skilled PT services warranted  He is safe to ambulate on unit prn  Did review use of spc with patient should he experience increased pain limiting ambulation  Patient demonstrated proper use of spc with spc in L hand for 30' ambulation modified (I)  Personal factors affecting pt at time of IE include: step(s) to enter environment and multi-level environment  D/C PT services at this time with patient at his PLOF  Recommendation: D/C PT          See flowsheet documentation for full assessment

## 2019-12-31 NOTE — PROGRESS NOTES
Vancomycin IV Pharmacy-to-Dose Consultation    Bekah Mcdonnell is a 35 y o  male who is currently receiving Vancomycin IV with management by the Pharmacy Consult service  Assessment/Plan:  The patient was reviewed  Renal function is stable and no signs or symptoms of nephrotoxicity and/or infusion reactions were documented in the chart  Based on todays assessment, continue current vancomycin (day # 2) dosing of 1500mg IV Q8hrs, with a plan for trough to be drawn at 0900 on 01/01/20  We will continue to follow the patients culture results and clinical progress daily      Leatha Curtis, Pharmacist

## 2019-12-31 NOTE — PROGRESS NOTES
Progress Note - Christin Cooney 1980, 35 y o  male MRN: [id number]    Unit/Bed#: -01 Encounter: [id number]    Primary Care Provider: No primary care provider on file  Date and time admitted to hospital: 12/30/2019 11:54 AM        Sepsis due to cellulitis [facility])  Assessment & Plan  Due to cellulitis  Elevated WBC, Rise of temperature at home  Continue current management    Super obesity  Assessment & Plan  Low fat low salt diet  Nutritional consult  TLC son outpatient    Essential hypertension  Assessment & Plan  Blood pressure appears to be better controlled when compared to yesterday  Monitor    Elevated bilirubin  Assessment & Plan  Unknown etiology  Minimal elevation of the bilirubin noted  No transaminitis  Ultrasound reviewed    Hypokalemia  Assessment & Plan  Potassium 3 5, improved with supplementation      * Cellulitis of right lower extremity  Assessment & Plan  Outpatient treatment failed with Keflex  Continue with vancomycin  Improving when compared to the picture from yesterday  Also erythema is receding from the line marked at the time of admission  Follow-up pharmacy consult for vanc trough  Follow-up blood culture, wound culture  Leukocytosis is improving          VTE Pharmacologic Prophylaxis:   Pharmacologic: Enoxaparin (Lovenox)  Mechanical VTE Prophylaxis in Place: Yes    Patient Centered Rounds: I have performed bedside rounds with nursing staff today  Discussions with Specialists or Other Care Team Provider:     Education and Discussions with Family / Patient: patient and family    Time Spent for Care: 30 minutes  More than 50% of total time spent on counseling and coordination of care as described above      Current Length of Stay: 1 day(s)    Current Patient Status: Inpatient   Certification Statement: The patient will continue to require additional inpatient hospital stay due to IV antibiotics    Discharge Plan:     Code Status: Level 1 - Full Code      Subjective: Patient seen and examined  No specific complaints  Patient reported that erythema is improving  Family in the room also confirmed that the discoloration is improving  Appetite is improving  No other specific complaints    Objective:     Vitals:   Temp (24hrs), Av 1 °F (37 3 °C), Min:97 9 °F (36 6 °C), Max:99 9 °F (37 7 °C)    Temp:  [97 9 °F (36 6 °C)-99 9 °F (37 7 °C)] 97 9 °F (36 6 °C)  HR:  [] 75  Resp:  [17-22] 17  BP: (132-160)/() 132/74  SpO2:  [98 %-100 %] 98 %  Body mass index is 53 5 kg/m²  Input and Output Summary (last 24 hours): Intake/Output Summary (Last 24 hours) at 2019 1439  Last data filed at 2019 0936  Gross per 24 hour   Intake 610 ml   Output --   Net 610 ml       Physical Exam:     Physical Exam   Constitutional: He appears well-developed  No distress  HENT:   Head: Normocephalic and atraumatic  Eyes: Right eye exhibits no discharge  Left eye exhibits no discharge  Neck: No JVD present  Cardiovascular: Normal rate and regular rhythm  No murmur heard  Pulmonary/Chest: Effort normal and breath sounds normal    Abdominal: Soft  He exhibits no distension  Musculoskeletal: Normal range of motion  Right lower extremity erythema and edema, erythema is receding from the line marked at the time of admit  Bullae noted   Neurological: He is alert  No cranial nerve deficit  Additional Data:     Labs:    Results from last 7 days   Lab Units 19  0512   WBC Thousand/uL 10 18*   HEMOGLOBIN g/dL 13 4   HEMATOCRIT % 39 4   PLATELETS Thousands/uL 229   NEUTROS PCT % 64   LYMPHS PCT % 24   MONOS PCT % 10   EOS PCT % 2     Results from last 7 days   Lab Units 19  0512   POTASSIUM mmol/L 3 5   CHLORIDE mmol/L 103   CO2 mmol/L 26   BUN mg/dL 11   CREATININE mg/dL 0 87   CALCIUM mg/dL 8 2*   ALK PHOS U/L 92   ALT U/L 32   AST U/L 20           * I Have Reviewed All Lab Data Listed Above  * Additional Pertinent Lab Tests Reviewed:  All Labs For Current Hospital Admission Reviewed    Imaging:    Imaging Reports Reviewed Today Include:  Ultrasound of the liver, lower extremity Doppler  Imaging Personally Reviewed by Myself Includes:      Recent Cultures (last 7 days):     Results from last 7 days   Lab Units 12/30/19  1543   GRAM STAIN RESULT  No Polys or Bacteria seen   WOUND CULTURE  No growth       Last 24 Hours Medication List:     Current Facility-Administered Medications:  acetaminophen 650 mg Oral Q6H PRN Jogrettaa Belt, CRNP    calcium carbonate 1,000 mg Oral Daily PRN Mehul Adler MD    docusate sodium 100 mg Oral BID Mehul Adler MD    enoxaparin 40 mg Subcutaneous Daily Ashu Adler MD    hydrochlorothiazide 25 mg Oral Daily Mehul Adler MD    HYDROmorphone 0 5 mg Intravenous Q1H PRN Mehrdad Valdez, KELLI    ondansetron 4 mg Intravenous Q6H PRN Svetlana Brown MD    oxyCODONE 10 mg Oral Q4H PRN Daraa Belt, KELLI    oxyCODONE 5 mg Oral Q4H PRN Mehrdad Valdez, KELLI    senna 1 tablet Oral Daily Svetlana Brown MD    vancomycin 15 mg/kg (Adjusted) Intravenous Q8H Svetlana Brown MD Last Rate: 1,500 mg (12/31/19 1136)        Today, Patient Was Seen By: Ezra Meza MD    ** Please Note: Dictation voice to text software may have been used in the creation of this document   **

## 2019-12-31 NOTE — PLAN OF CARE
Problem: PAIN - ADULT  Goal: Verbalizes/displays adequate comfort level or baseline comfort level  Description  Interventions:  - Encourage patient to monitor pain and request assistance  - Assess pain using appropriate pain scale  - Administer analgesics based on type and severity of pain and evaluate response  - Implement non-pharmacological measures as appropriate and evaluate response  - Consider cultural and social influences on pain and pain management  - Notify physician/advanced practitioner if interventions unsuccessful or patient reports new pain  12/31/2019 1747 by Luly John RN  Outcome: Progressing  12/31/2019 1415 by Luly John RN  Outcome: Progressing     Problem: INFECTION - ADULT  Goal: Absence or prevention of progression during hospitalization  Description  INTERVENTIONS:  - Assess and monitor for signs and symptoms of infection  - Monitor lab/diagnostic results  - Monitor all insertion sites, i e  indwelling lines, tubes, and drains  - Monitor endotracheal if appropriate and nasal secretions for changes in amount and color  - [place] appropriate cooling/warming therapies per order  - Administer medications as ordered  - Instruct and encourage patient and family to use good hand hygiene technique  - Identify and instruct in appropriate isolation precautions for identified infection/condition  12/31/2019 1747 by Luly John RN  Outcome: Progressing  12/31/2019 1415 by Luly John RN  Outcome: Progressing  Goal: Absence of fever/infection during neutropenic period  Description  INTERVENTIONS:  - Monitor WBC    12/31/2019 1747 by Luly John RN  Outcome: Progressing  12/31/2019 1415 by Luly John RN  Outcome: Progressing     Problem: SAFETY ADULT  Goal: Patient will remain free of falls  Description  INTERVENTIONS:  - Assess patient frequently for physical needs  -  Identify cognitive and physical deficits and behaviors that affect risk of falls    -  [place] fall precautions as indicated by assessment   - Educate patient/family on patient safety including physical limitations  - Instruct patient to call for assistance with activity based on assessment  - Modify environment to reduce risk of injury  - Consider OT/PT consult to assist with strengthening/mobility  12/31/2019 1747 by Isabel Ray RN  Outcome: Progressing  12/31/2019 1415 by Isabel Ray RN  Outcome: Progressing  Goal: Maintain or return to baseline ADL function  Description  INTERVENTIONS:  -  Assess patient's ability to carry out ADLs; assess patient's baseline for ADL function and identify physical deficits which impact ability to perform ADLs (bathing, care of mouth/teeth, toileting, grooming, dressing, etc )  - Assess/evaluate cause of self-care deficits   - Assess range of motion  - Assess patient's mobility; develop plan if impaired  - Assess patient's need for assistive devices and provide as appropriate  - Encourage maximum independence but intervene and supervise when necessary  - Involve family in performance of ADLs  - Assess for home care needs following discharge   - Consider OT consult to assist with ADL evaluation and planning for discharge  - Provide patient education as appropriate  12/31/2019 1747 by Isabel Ray RN  Outcome: Progressing  12/31/2019 1415 by Isabel Ray RN  Outcome: Progressing  Goal: Maintain or return mobility status to optimal level  Description  INTERVENTIONS:  - Assess patient's baseline mobility status (ambulation, transfers, stairs, etc )    - Identify cognitive and physical deficits and behaviors that affect mobility  - Identify mobility aids required to assist with transfers and/or ambulation (gait belt, sit-to-stand, lift, walker, cane, etc )  - [place] fall precautions as indicated by assessment  - Record patient progress and toleration of activity level on Mobility SBAR; progress patient to next Phase/Stage  - Instruct patient to call for assistance with activity based on assessment  - Consider rehabilitation consult to assist with strengthening/weightbearing, etc   12/31/2019 1747 by Arthur Clifton RN  Outcome: Progressing  12/31/2019 1415 by Arthur Clifton RN  Outcome: Progressing     Problem: DISCHARGE PLANNING  Goal: Discharge to home or other facility with appropriate resources  Description  INTERVENTIONS:  - Identify barriers to discharge w/patient and caregiver  - Arrange for needed discharge resources and transportation as appropriate  - Identify discharge learning needs (meds, wound care, etc )  - Arrange for interpretive services to assist at discharge as needed  - Refer to Case Management Department for coordinating discharge planning if the patient needs post-hospital services based on physician/advanced practitioner order or complex needs related to functional status, cognitive ability, or social support system  12/31/2019 1747 by Arthur Clifton RN  Outcome: Progressing  12/31/2019 1415 by Arthur Clifton RN  Outcome: Progressing     Problem: Knowledge Deficit  Goal: Patient/family/caregiver demonstrates understanding of disease process, treatment plan, medications, and discharge instructions  Description  Complete learning assessment and assess knowledge base    Interventions:  - Provide teaching at level of understanding  - Provide teaching via preferred learning methods  12/31/2019 1747 by Arthur Clifton RN  Outcome: Progressing  12/31/2019 1415 by Arthur Clifton RN  Outcome: Progressing

## 2019-12-31 NOTE — SOCIAL WORK
CM met with patient at the bedside,baseline information was obtained  CM discussed the role of CM in helping the patient develop a discharge plan and assist the patient in carry out their plan  Pt lives with his SO Janell and their 3 children in a 2 SH, 3+6 FRANCESCA and 12-14 steps to the 2nd floor bedroom and bathroom  Home does not have a bathroom on the first floor  Pt performed ADLs independently pta  Pt ambulates independently  No DME  Pt drives and works FT  Pt has no hx of HHC or STR  Pt denies hx of MH or D&A  PCP: Pt does not have a PCP  Pt states his insurance will change tomorrow  CM provided pt with PCP list    Preferred Pharmacy: [address] [place] [address] at [facility] in [place]  Contact: [facility] ([address] [place] [address]) [phone], Micah Josue (father) [phone],AMBER Marquez (mother) [phone]  Pt has no POA or LW  CM explained PA   Pt family will transport pt home at time of d/c      CM to remain available until d/c for any needs or recommendations

## 2019-12-31 NOTE — CONSULTS
Consult Note - Wound   Carolee Honeycutt 35 y o  male MRN: [id number]  Unit/Bed#: -01 Encounter: [id number]      History and Present Illness:    Patient is a 35year old male admitted for cellulitis  Wound care is consulted for wound on the right lower extremity  Patient is alert, oriented, ambulatory and continent  He states that he did not have a wound when he arrived at the hospital and that it only just developed  Patient is diabetic and prior to admission the patient does not have a history of chronic wounds or nonhealing ulcers  Patient was sitting in the recliner at bedside with legs elevated at time of assessment  Assessment Findings:     1  Right lower extremity: skin is dark red/purple and hot to touch in comparison to the LLE  There is a bullae present on the medial aspect of the leg just above the malleolus that is draining but still has large amount of fluctuance and roof is still mostly intact  A small satellite lesion that is draining to the left of the bullae is present  Drainage is small to moderate amount of serous fluid  2  Left lower extremity: clean, dry and intact  3  Bilateral heels: cracked, dry, intact  Wound Care    RLE: irrigate with normal saline and pat dry  Use a large sheet of adaptic and place over the blister and satellite lesion  Cover with large sheet of Maxorb Ag and secure with araceli and paper tape  Change every other day or as needed for strike through drainage  Wound 12/30/19 Leg Posterior;Right (Active)   Wound Image   12/31/2019  9:00 AM   Wound Description Light purple;Fragile; Swelling 12/31/2019  9:00 AM   Lory-wound Assessment Swelling;Fragile; Hyperpigmented 12/31/2019  9:00 AM   Wound Length (cm) 2 cm 12/31/2019  9:00 AM   Wound Width (cm) 3 5 cm 12/31/2019  9:00 AM   Wound Depth (cm) 0 1 12/31/2019  9:00 AM   Calculated Wound Area (cm^2) 7 cm^2 12/31/2019  9:00 AM   Calculated Wound Volume (cm^3) 0 7 cm^3 12/31/2019  9:00 AM   Drainage Amount Small 12/31/2019  9:00 AM   Drainage Description Serous 12/31/2019  9:00 AM   Treatments Site care 12/31/2019  9:00 AM   Dressing Non adherent;Calcium Alginate with Silver;Gauze 12/31/2019  9:00 AM   Wound packed? No 12/31/2019  3:10 AM   Dressing Changed New 12/31/2019  9:00 AM   Patient Tolerance Tolerated well 12/31/2019  9:00 AM   Dressing Status Clean;Dry; Intact 12/31/2019  9:00 AM       Wound Care will continue to follow  Call or Tiger text with any questions

## 2019-12-31 NOTE — PLAN OF CARE
Problem: PAIN - ADULT  Goal: Verbalizes/displays adequate comfort level or baseline comfort level  Description  Interventions:  - Encourage patient to monitor pain and request assistance  - Assess pain using appropriate pain scale  - Administer analgesics based on type and severity of pain and evaluate response  - Implement non-pharmacological measures as appropriate and evaluate response  - Consider cultural and social influences on pain and pain management  - Notify physician/advanced practitioner if interventions unsuccessful or patient reports new pain  Outcome: Progressing     Problem: INFECTION - ADULT  Goal: Absence or prevention of progression during hospitalization  Description  INTERVENTIONS:  - Assess and monitor for signs and symptoms of infection  - Monitor lab/diagnostic results  - Monitor all insertion sites, i e  indwelling lines, tubes, and drains  - Monitor endotracheal if appropriate and nasal secretions for changes in amount and color  - [place] appropriate cooling/warming therapies per order  - Administer medications as ordered  - Instruct and encourage patient and family to use good hand hygiene technique  - Identify and instruct in appropriate isolation precautions for identified infection/condition  Outcome: Progressing  Goal: Absence of fever/infection during neutropenic period  Description  INTERVENTIONS:  - Monitor WBC    Outcome: Progressing     Problem: SAFETY ADULT  Goal: Patient will remain free of falls  Description  INTERVENTIONS:  - Assess patient frequently for physical needs  -  Identify cognitive and physical deficits and behaviors that affect risk of falls    -  [place] fall precautions as indicated by assessment   - Educate patient/family on patient safety including physical limitations  - Instruct patient to call for assistance with activity based on assessment  - Modify environment to reduce risk of injury  - Consider OT/PT consult to assist with strengthening/mobility  Outcome: Progressing  Goal: Maintain or return to baseline ADL function  Description  INTERVENTIONS:  -  Assess patient's ability to carry out ADLs; assess patient's baseline for ADL function and identify physical deficits which impact ability to perform ADLs (bathing, care of mouth/teeth, toileting, grooming, dressing, etc )  - Assess/evaluate cause of self-care deficits   - Assess range of motion  - Assess patient's mobility; develop plan if impaired  - Assess patient's need for assistive devices and provide as appropriate  - Encourage maximum independence but intervene and supervise when necessary  - Involve family in performance of ADLs  - Assess for home care needs following discharge   - Consider OT consult to assist with ADL evaluation and planning for discharge  - Provide patient education as appropriate  Outcome: Progressing  Goal: Maintain or return mobility status to optimal level  Description  INTERVENTIONS:  - Assess patient's baseline mobility status (ambulation, transfers, stairs, etc )    - Identify cognitive and physical deficits and behaviors that affect mobility  - Identify mobility aids required to assist with transfers and/or ambulation (gait belt, sit-to-stand, lift, walker, cane, etc )  - [place] fall precautions as indicated by assessment  - Record patient progress and toleration of activity level on Mobility SBAR; progress patient to next Phase/Stage  - Instruct patient to call for assistance with activity based on assessment  - Consider rehabilitation consult to assist with strengthening/weightbearing, etc   Outcome: Progressing     Problem: DISCHARGE PLANNING  Goal: Discharge to home or other facility with appropriate resources  Description  INTERVENTIONS:  - Identify barriers to discharge w/patient and caregiver  - Arrange for needed discharge resources and transportation as appropriate  - Identify discharge learning needs (meds, wound care, etc )  - Arrange for interpretive services to assist at discharge as needed  - Refer to Case Management Department for coordinating discharge planning if the patient needs post-hospital services based on physician/advanced practitioner order or complex needs related to functional status, cognitive ability, or social support system  Outcome: Progressing     Problem: Knowledge Deficit  Goal: Patient/family/caregiver demonstrates understanding of disease process, treatment plan, medications, and discharge instructions  Description  Complete learning assessment and assess knowledge base    Interventions:  - Provide teaching at level of understanding  - Provide teaching via preferred learning methods  Outcome: Progressing

## 2019-12-31 NOTE — PHYSICAL THERAPY NOTE
PHYSICAL THERAPY EVALUATION  NAME:  eDborah Oseguera  DATE: 12/31/19    AGE:   35 y o  Mrn:   [id number]  ADMIT DX:  Cellulitis [L03 90]  Cellulitis of right lower extremity [I71 423]  Leg edema, right [R60 0]    Past Medical History:   Diagnosis Date    Hypertension      Length Of Stay: 1  Performed at least 2 patient identifiers during session: Name and Birthday  PHYSICAL THERAPY EVALUATION :      12/31/19 0840   Note Type   Note type Eval only   Pain Assessment   Pain Assessment 0-10   Pain Score 7   Pain Location Leg   Pain Orientation Right   Home Living   Type of Home House   Home Layout Two level  (9 FRANCESCA B HRs)   Bathroom Shower/Tub Tub/shower unit   Additional Comments Pt reports living in a 2 story home with 9 FRANCESCA B HR  Pts bed/bath on 2nd floor  Ambulating with no AD + driving and working full time   Prior Function   Level of [place] Independent with ADLs and functional mobility   Lives With Spouse;Son;Daughter   Receives Help From Family   ADL Assistance Independent   IADLs Independent   Falls in the last 6 months 0   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)   Comments Pt reports living in a 2 story home with his wife and 3 kids  Pt ambulates with no AD and drives until recently when his leg swelling occured   Restrictions/Precautions   Other Precautions Pain   Cognition   Orientation Level Oriented X4   Following Commands Follows all commands and directions without difficulty   RLE Assessment   RLE Assessment WFL  (ankle DF to neutral due to pain; 5/5 except 3+/5 R ankle)   LLE Assessment   LLE Assessment WFL  (5/5)   Light Touch   RLE Light Touch Grossly intact   LLE Light Touch Grossly intact   Bed Mobility   Supine to Sit 7  Independent   Sit to Supine 7  Independent   Transfers   Sit to Stand 7  Independent   Stand to Sit 7  Independent   Stand pivot 7  Independent   Ambulation/Elevation   Gait pattern Antalgic; Wide BJ   Gait Assistance 7  Independent   Assistive Device None   Distance 200'   Stair Management Assistance 6  Modified independent   Additional items Increased time required   Stair Management Technique One rail L;Reciprocal;Step to pattern  (step to down lead with L LE, then R, reciprocal up)   Number of Stairs 14   Balance   Static Sitting Normal   Dynamic Sitting Normal   Static Standing Normal   Dynamic Standing Good   Ambulatory Normal   Activity Tolerance   Activity Tolerance Patient tolerated treatment well  (reports pain 7/10, unchanged)   Medical Staff Made Aware Pete BRYSON   Recommendation   Recommendation D/C PT   Additional Comments [facility] 6 clicks 11/25     (Please find full objective findings from PT assessment regarding body systems outlined above)  Assessment: Pt is a 35 y o  male seen for PT evaluation s/p admission to [address] [place] [address] 18 on 12/30/2019 with Cellulitis of right lower extremity  Pt with PMHx HTN and obesity  WBC count 10 18, potassium 3 3, Abdominal US showing hepatic steatosis  (-) DVT  Order placed for PT services  Upon evaluation: Pt is presenting at an Independent level for functional mobility despite increased c/o pain  He ambulates with antalgic gait pattern associated with increased pain, but doesn't limit his ability to ambulate or complete stairs to acces s home environment  He appears to be at his PLOF of (I) at this time with no acute care skilled PT services warranted  He is safe to ambulate on unit prn  Did review use of spc with patient should he experience increased pain limiting ambulation  Patient demonstrated proper use of spc with spc in L hand for 30' ambulation modified (I)  Personal factors affecting pt at time of IE include: step(s) to enter environment and multi-level environment  D/C PT services at this time with patient at his PLOF        Sage Overton, PT,DPT

## 2019-12-31 NOTE — ASSESSMENT & PLAN NOTE
Outpatient treatment failed with Keflex  Continue with vancomycin  Improving when compared to the picture from yesterday    Also erythema is receding from the line marked at the time of admission  Follow-up pharmacy consult for vanc trough  Follow-up blood culture, wound culture  Leukocytosis is improving

## 2019-12-31 NOTE — PROGRESS NOTES
Vancomycin Assessment    Herman Romero is a 35 y o  male who was currently prescribed vancomycin 2250 mg iv q 12 hrs for skin-soft tissue infection     Relevant clinical data and objective history reviewed:  Creatinine   Date Value Ref Range Status   12/30/2019 1 04 0 60 - 1 30 mg/dL Final     Comment:     Standardized to IDMS reference method     /72 (BP Location: Left arm)   Pulse 101   Temp 99 9 °F (37 7 °C)   Resp 22   Ht 5' 8" (1 727 m)   Wt (!) 157 kg (347 lb 0 1 oz)   SpO2 98%   BMI 52 76 kg/m²   I/O last 3 completed shifts: In: 250 [IV Piggyback:250]  Out: -   Lab Results   Component Value Date/Time    BUN 12 12/30/2019 01:41 PM    WBC 11 10 (H) 12/30/2019 01:41 PM    HGB 14 9 12/30/2019 01:41 PM    HCT 43 6 12/30/2019 01:41 PM    MCV 84 12/30/2019 01:41 PM     12/30/2019 01:41 PM     Temp Readings from Last 3 Encounters:   12/30/19 99 9 °F (37 7 °C)     Vancomycin Days of Therapy: 1    Assessment/Plan  The patient is currently on vancomycin utilizing scheduled dosing based on adjusted body weight (due to obesity)  Baseline risks associated with therapy include: concomitant nephrotoxic medications  The patient was currently prescribed 2250 mg iv q 12 hrs and after clinical evaluation will be changed to 1500 mg iv q 8 hrs  Pharmacy will also follow closely for s/sx of nephrotoxicity, infusion reactions and appropriateness of therapy  BMP and CBC will be ordered per protocol  Plan for trough as patient approaches steady state, prior to the 5th  dose at approximately 0900 on 01/01/20  Due to infection severity, will target a trough of 15-20 (appropriate for most indications)   Pharmacy will continue to follow the patients culture results and clinical progress daily      Odell Both, Pharmacist

## 2019-12-31 NOTE — OCCUPATIONAL THERAPY NOTE
[address]  Evaluation     Patient Name: Eloisa Zepeda  [id number] Date: 12/31/2019  Problem List  Principal Problem:    Cellulitis of right lower extremity  Active Problems:    Hypokalemia    Elevated bilirubin    Essential hypertension    Super obesity    Sepsis due to cellulitis [facility])    Past Medical History  Past Medical History:   Diagnosis Date    Hypertension      Past Surgical History  History reviewed  No pertinent surgical history  12/31/19 4042   Note Type   Note type Eval only   Restrictions/Precautions   Other Precautions Fall Risk   Pain Assessment   Pain Assessment 0-10   Pain Score 7   Pain Location Leg   Pain Orientation Right   Home Living   Type of Home House   Home Layout Two level;Bed/bath upstairs  (9 FRANCESCA B HR)   Bathroom Shower/Tub Tub/shower unit   Additional Comments Pt reports living in a 2 story home with 9 FRANCESCA B HR  Pts bed/bath on 2nd floor  Ambulating with no AD + driving and working full time   Prior Function   Level of [place] Independent with ADLs and functional mobility   Lives With Spouse;Son;Daughter   Receives Help From Family   ADL Assistance Independent   IADLs Independent   Falls in the last 6 months 0   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)   Comments Pt reports living in a 2 story home with his wife and 3 kids  Pt ambulates with no AD and drives until recently when his leg swelling occured   Lifestyle   Autonomy Pt reports completing ADLs and IADL tasks @ I   Reciprocal Relationships Pt lives with wife and 3 children   Service to Others Pt works full time at a group home   Intrinsic Gratification Pt enjoys watching his kids play sports   Psychosocial   Psychosocial (WDL) WDL   ADL   Where Assessed Edge of bed   UB Dressing Assistance 7  Independent   LB Dressing Assistance 4  Minimal Assistance   LB Dressing Deficit Don/doff L sock; Don/doff R sock; Tie shoes   Toileting Assistance  7  Independent   Additional Comments Pt having increased difficulty with donning/doffing B socks/shoes d/t increased pain and swelling in BLE  Pt reports he will have assistance from his wife and children upon returning home   Bed Mobility   Supine to Sit 7  Independent   Sit to Supine 7  Independent   Transfers   Sit to Stand 7  Independent   Stand to Sit 7  Independent   Stand pivot 7  Independent   Functional Mobility   Functional Mobility 7  Independent   Balance   Static Sitting Normal   Dynamic Sitting Normal   Static Standing Good   Dynamic Standing Fair +   Activity Tolerance   Activity Tolerance Patient tolerated treatment well   Medical Staff Made Aware Spoke with PT, Krystal Gonzalez   RUE Assessment   RUE Assessment WNL   LUE Assessment   LUE Assessment WNL   Hand Function   Gross Motor Coordination Functional   Fine Motor Coordination Functional   Sensation   Light Touch No apparent deficits   Vision-Basic Assessment   Current Vision Wears glasses all the time   Cognition   Overall Cognitive Status WFL   Arousal/Participation Responsive; Alert; Cooperative   Attention Within functional limits   Orientation Level Oriented X4   Following Commands Follows all commands and directions without difficulty   Assessment   Prognosis Good   Assessment Pt is a 36 y/o M admitted to [address] [place] [address] 12/30/19 d/t experiencing increased pain and swelling in BLE  Dx: cellulitis of RLE  Pt with PMHx impacting performance during functional tasks including: obesity, HTN, hypokalemia  Pt reports living at home in a 2 story home with 9 FR[address] B HR with his wife and 3 children  Pt bed/bath on 2nd floor  Pt completing ADLs and IADLs + driving and working full time @ I  On evaluation, Pt completing bed mobility @ I  Functional transfers and functional ambulation with no AD @ I  Pt completing UB/LB ADLs + toileting @ I with assistance required only for donning/doffing socks/shoes d/t increased pain and swelling  Pt reports his family will be able to assist him upon return home   Pt scoring 100/100 on Barthel Index  Pt presents with increased pain  At this time, Pt does not require any further OT services d/t presenting at baseline during functional tasks  Upon d/c home Pt would benefit from assistance from his family for LB ADLs and IADL tasks until pain subsides  D/c OT services effective 12/31/19  If new concerns arise, please re-consult  Plan   OT Frequency Eval only   Recommendation   Recommendation   (Assist from family at home PRN)   OT Discharge Recommendation   (Assist from family at home PRN)   Barthel Index   Feeding 10   Bathing 5   Grooming Score 5   Dressing Score 10   Bladder Score 10   Bowels Score 10   Toilet Use Score 10   Transfers (Bed/Chair) Score 15   Mobility (Level Surface) Score 15   Stairs Score 10   Barthel Index Score 100     Pt appears to be at baseline during ADLs and functional tasks  D/c OT services  If new concerns arise, please re-consult      Tiffany Robins, OTR/L

## 2019-12-31 NOTE — PLAN OF CARE
Problem: PAIN - ADULT  Goal: Verbalizes/displays adequate comfort level or baseline comfort level  Description  Interventions:  - Encourage patient to monitor pain and request assistance  - Assess pain using appropriate pain scale  - Administer analgesics based on type and severity of pain and evaluate response  - Implement non-pharmacological measures as appropriate and evaluate response  - Consider cultural and social influences on pain and pain management  - Notify physician/advanced practitioner if interventions unsuccessful or patient reports new pain  Outcome: Progressing     Problem: INFECTION - ADULT  Goal: Absence or prevention of progression during hospitalization  Description  INTERVENTIONS:  - Assess and monitor for signs and symptoms of infection  - Monitor lab/diagnostic results  - Monitor all insertion sites, i e  indwelling lines, tubes, and drains  - Monitor endotracheal if appropriate and nasal secretions for changes in amount and color  - [place] appropriate cooling/warming therapies per order  - Administer medications as ordered  - Instruct and encourage patient and family to use good hand hygiene technique  - Identify and instruct in appropriate isolation precautions for identified infection/condition  Outcome: Progressing  Goal: Absence of fever/infection during neutropenic period  Description  INTERVENTIONS:  - Monitor WBC    Outcome: Progressing     Problem: SAFETY ADULT  Goal: Patient will remain free of falls  Description  INTERVENTIONS:  - Assess patient frequently for physical needs  -  Identify cognitive and physical deficits and behaviors that affect risk of falls    -  [place] fall precautions as indicated by assessment   - Educate patient/family on patient safety including physical limitations  - Instruct patient to call for assistance with activity based on assessment  - Modify environment to reduce risk of injury  - Consider OT/PT consult to assist with strengthening/mobility  Outcome: Progressing  Goal: Maintain or return to baseline ADL function  Description  INTERVENTIONS:  -  Assess patient's ability to carry out ADLs; assess patient's baseline for ADL function and identify physical deficits which impact ability to perform ADLs (bathing, care of mouth/teeth, toileting, grooming, dressing, etc )  - Assess/evaluate cause of self-care deficits   - Assess range of motion  - Assess patient's mobility; develop plan if impaired  - Assess patient's need for assistive devices and provide as appropriate  - Encourage maximum independence but intervene and supervise when necessary  - Involve family in performance of ADLs  - Assess for home care needs following discharge   - Consider OT consult to assist with ADL evaluation and planning for discharge  - Provide patient education as appropriate  Outcome: Progressing  Goal: Maintain or return mobility status to optimal level  Description  INTERVENTIONS:  - Assess patient's baseline mobility status (ambulation, transfers, stairs, etc )    - Identify cognitive and physical deficits and behaviors that affect mobility  - Identify mobility aids required to assist with transfers and/or ambulation (gait belt, sit-to-stand, lift, walker, cane, etc )  - [place] fall precautions as indicated by assessment  - Record patient progress and toleration of activity level on Mobility SBAR; progress patient to next Phase/Stage  - Instruct patient to call for assistance with activity based on assessment  - Consider rehabilitation consult to assist with strengthening/weightbearing, etc   Outcome: Progressing     Problem: DISCHARGE PLANNING  Goal: Discharge to home or other facility with appropriate resources  Description  INTERVENTIONS:  - Identify barriers to discharge w/patient and caregiver  - Arrange for needed discharge resources and transportation as appropriate  - Identify discharge learning needs (meds, wound care, etc )  - Arrange for interpretive services to assist at discharge as needed  - Refer to Case Management Department for coordinating discharge planning if the patient needs post-hospital services based on physician/advanced practitioner order or complex needs related to functional status, cognitive ability, or social support system  Outcome: Progressing     Problem: Knowledge Deficit  Goal: Patient/family/caregiver demonstrates understanding of disease process, treatment plan, medications, and discharge instructions  Description  Complete learning assessment and assess knowledge base    Interventions:  - Provide teaching at level of understanding  - Provide teaching via preferred learning methods  Outcome: Progressing

## 2020-01-01 LAB
ERYTHROCYTE [DISTWIDTH] IN BLOOD BY AUTOMATED COUNT: 12.7 % (ref 11.6–15.1)
HCT VFR BLD AUTO: 42.2 % (ref 36.5–49.3)
HGB BLD-MCNC: 14.3 G/DL (ref 12–17)
MCH RBC QN AUTO: 28.7 PG (ref 26.8–34.3)
MCHC RBC AUTO-ENTMCNC: 33.9 G/DL (ref 31.4–37.4)
MCV RBC AUTO: 85 FL (ref 82–98)
PLATELET # BLD AUTO: 247 THOUSANDS/UL (ref 149–390)
PMV BLD AUTO: 11.1 FL (ref 8.9–12.7)
RBC # BLD AUTO: 4.98 MILLION/UL (ref 3.88–5.62)
VANCOMYCIN TROUGH SERPL-MCNC: 10.2 UG/ML (ref 10–20)
WBC # BLD AUTO: 11.36 THOUSAND/UL (ref 4.31–10.16)

## 2020-01-01 PROCEDURE — 85027 COMPLETE CBC AUTOMATED: CPT | Performed by: FAMILY MEDICINE

## 2020-01-01 PROCEDURE — 99232 SBSQ HOSP IP/OBS MODERATE 35: CPT | Performed by: FAMILY MEDICINE

## 2020-01-01 PROCEDURE — 80202 ASSAY OF VANCOMYCIN: CPT | Performed by: FAMILY MEDICINE

## 2020-01-01 RX ADMIN — HYDROCHLOROTHIAZIDE 25 MG: 25 TABLET ORAL at 08:29

## 2020-01-01 RX ADMIN — OXYCODONE HYDROCHLORIDE 10 MG: 10 TABLET ORAL at 21:36

## 2020-01-01 RX ADMIN — ENOXAPARIN SODIUM 40 MG: 40 INJECTION SUBCUTANEOUS at 08:29

## 2020-01-01 RX ADMIN — DOCUSATE SODIUM 100 MG: 100 CAPSULE, LIQUID FILLED ORAL at 18:29

## 2020-01-01 RX ADMIN — VANCOMYCIN HYDROCHLORIDE 1500 MG: 1 INJECTION, POWDER, LYOPHILIZED, FOR SOLUTION INTRAVENOUS at 11:53

## 2020-01-01 RX ADMIN — ACETAMINOPHEN 650 MG: 325 TABLET ORAL at 21:35

## 2020-01-01 RX ADMIN — VANCOMYCIN HYDROCHLORIDE 2000 MG: 1 INJECTION, POWDER, LYOPHILIZED, FOR SOLUTION INTRAVENOUS at 18:30

## 2020-01-01 RX ADMIN — OXYCODONE HYDROCHLORIDE 10 MG: 10 TABLET ORAL at 03:33

## 2020-01-01 RX ADMIN — ACETAMINOPHEN 650 MG: 325 TABLET ORAL at 08:29

## 2020-01-01 RX ADMIN — OXYCODONE HYDROCHLORIDE 10 MG: 10 TABLET ORAL at 15:17

## 2020-01-01 RX ADMIN — VANCOMYCIN HYDROCHLORIDE 1500 MG: 1 INJECTION, POWDER, LYOPHILIZED, FOR SOLUTION INTRAVENOUS at 03:29

## 2020-01-01 NOTE — PLAN OF CARE
Problem: PAIN - ADULT  Goal: Verbalizes/displays adequate comfort level or baseline comfort level  Description  Interventions:  - Encourage patient to monitor pain and request assistance  - Assess pain using appropriate pain scale  - Administer analgesics based on type and severity of pain and evaluate response  - Implement non-pharmacological measures as appropriate and evaluate response  - Consider cultural and social influences on pain and pain management  - Notify physician/advanced practitioner if interventions unsuccessful or patient reports new pain  Outcome: Progressing     Problem: INFECTION - ADULT  Goal: Absence or prevention of progression during hospitalization  Description  INTERVENTIONS:  - Assess and monitor for signs and symptoms of infection  - Monitor lab/diagnostic results  - Monitor all insertion sites, i e  indwelling lines, tubes, and drains  - Monitor endotracheal if appropriate and nasal secretions for changes in amount and color  - [place] appropriate cooling/warming therapies per order  - Administer medications as ordered  - Instruct and encourage patient and family to use good hand hygiene technique  - Identify and instruct in appropriate isolation precautions for identified infection/condition  Outcome: Progressing  Goal: Absence of fever/infection during neutropenic period  Description  INTERVENTIONS:  - Monitor WBC    Outcome: Progressing     Problem: SAFETY ADULT  Goal: Patient will remain free of falls  Description  INTERVENTIONS:  - Assess patient frequently for physical needs  -  Identify cognitive and physical deficits and behaviors that affect risk of falls    -  [place] fall precautions as indicated by assessment   - Educate patient/family on patient safety including physical limitations  - Instruct patient to call for assistance with activity based on assessment  - Modify environment to reduce risk of injury  - Consider OT/PT consult to assist with strengthening/mobility  Outcome: Progressing  Goal: Maintain or return to baseline ADL function  Description  INTERVENTIONS:  -  Assess patient's ability to carry out ADLs; assess patient's baseline for ADL function and identify physical deficits which impact ability to perform ADLs (bathing, care of mouth/teeth, toileting, grooming, dressing, etc )  - Assess/evaluate cause of self-care deficits   - Assess range of motion  - Assess patient's mobility; develop plan if impaired  - Assess patient's need for assistive devices and provide as appropriate  - Encourage maximum independence but intervene and supervise when necessary  - Involve family in performance of ADLs  - Assess for home care needs following discharge   - Consider OT consult to assist with ADL evaluation and planning for discharge  - Provide patient education as appropriate  Outcome: Progressing  Goal: Maintain or return mobility status to optimal level  Description  INTERVENTIONS:  - Assess patient's baseline mobility status (ambulation, transfers, stairs, etc )    - Identify cognitive and physical deficits and behaviors that affect mobility  - Identify mobility aids required to assist with transfers and/or ambulation (gait belt, sit-to-stand, lift, walker, cane, etc )  - [place] fall precautions as indicated by assessment  - Record patient progress and toleration of activity level on Mobility SBAR; progress patient to next Phase/Stage  - Instruct patient to call for assistance with activity based on assessment  - Consider rehabilitation consult to assist with strengthening/weightbearing, etc   Outcome: Progressing     Problem: DISCHARGE PLANNING  Goal: Discharge to home or other facility with appropriate resources  Description  INTERVENTIONS:  - Identify barriers to discharge w/patient and caregiver  - Arrange for needed discharge resources and transportation as appropriate  - Identify discharge learning needs (meds, wound care, etc )  - Arrange for interpretive services to assist at discharge as needed  - Refer to Case Management Department for coordinating discharge planning if the patient needs post-hospital services based on physician/advanced practitioner order or complex needs related to functional status, cognitive ability, or social support system  Outcome: Progressing     Problem: Knowledge Deficit  Goal: Patient/family/caregiver demonstrates understanding of disease process, treatment plan, medications, and discharge instructions  Description  Complete learning assessment and assess knowledge base    Interventions:  - Provide teaching at level of understanding  - Provide teaching via preferred learning methods  Outcome: Progressing

## 2020-01-01 NOTE — PLAN OF CARE
Problem: PAIN - ADULT  Goal: Verbalizes/displays adequate comfort level or baseline comfort level  Description  Interventions:  - Encourage patient to monitor pain and request assistance  - Assess pain using appropriate pain scale  - Administer analgesics based on type and severity of pain and evaluate response  - Implement non-pharmacological measures as appropriate and evaluate response  - Consider cultural and social influences on pain and pain management  - Notify physician/advanced practitioner if interventions unsuccessful or patient reports new pain  Outcome: Progressing     Problem: INFECTION - ADULT  Goal: Absence or prevention of progression during hospitalization  Description  INTERVENTIONS:  - Assess and monitor for signs and symptoms of infection  - Monitor lab/diagnostic results  - Monitor all insertion sites, i e  indwelling lines, tubes, and drains  - Monitor endotracheal if appropriate and nasal secretions for changes in amount and color  - [place] appropriate cooling/warming therapies per order  - Administer medications as ordered  - Instruct and encourage patient and family to use good hand hygiene technique  - Identify and instruct in appropriate isolation precautions for identified infection/condition  Outcome: Progressing  Goal: Absence of fever/infection during neutropenic period  Description  INTERVENTIONS:  - Monitor WBC    Outcome: Progressing     Problem: SAFETY ADULT  Goal: Patient will remain free of falls  Description  INTERVENTIONS:  - Assess patient frequently for physical needs  -  Identify cognitive and physical deficits and behaviors that affect risk of falls    -  [place] fall precautions as indicated by assessment   - Educate patient/family on patient safety including physical limitations  - Instruct patient to call for assistance with activity based on assessment  - Modify environment to reduce risk of injury  - Consider OT/PT consult to assist with strengthening/mobility  Outcome: Progressing  Goal: Maintain or return to baseline ADL function  Description  INTERVENTIONS:  -  Assess patient's ability to carry out ADLs; assess patient's baseline for ADL function and identify physical deficits which impact ability to perform ADLs (bathing, care of mouth/teeth, toileting, grooming, dressing, etc )  - Assess/evaluate cause of self-care deficits   - Assess range of motion  - Assess patient's mobility; develop plan if impaired  - Assess patient's need for assistive devices and provide as appropriate  - Encourage maximum independence but intervene and supervise when necessary  - Involve family in performance of ADLs  - Assess for home care needs following discharge   - Consider OT consult to assist with ADL evaluation and planning for discharge  - Provide patient education as appropriate  Outcome: Progressing  Goal: Maintain or return mobility status to optimal level  Description  INTERVENTIONS:  - Assess patient's baseline mobility status (ambulation, transfers, stairs, etc )    - Identify cognitive and physical deficits and behaviors that affect mobility  - Identify mobility aids required to assist with transfers and/or ambulation (gait belt, sit-to-stand, lift, walker, cane, etc )  - [place] fall precautions as indicated by assessment  - Record patient progress and toleration of activity level on Mobility SBAR; progress patient to next Phase/Stage  - Instruct patient to call for assistance with activity based on assessment  - Consider rehabilitation consult to assist with strengthening/weightbearing, etc   Outcome: Progressing     Problem: DISCHARGE PLANNING  Goal: Discharge to home or other facility with appropriate resources  Description  INTERVENTIONS:  - Identify barriers to discharge w/patient and caregiver  - Arrange for needed discharge resources and transportation as appropriate  - Identify discharge learning needs (meds, wound care, etc )  - Arrange for interpretive services to assist at discharge as needed  - Refer to Case Management Department for coordinating discharge planning if the patient needs post-hospital services based on physician/advanced practitioner order or complex needs related to functional status, cognitive ability, or social support system  Outcome: Progressing     Problem: Knowledge Deficit  Goal: Patient/family/caregiver demonstrates understanding of disease process, treatment plan, medications, and discharge instructions  Description  Complete learning assessment and assess knowledge base    Interventions:  - Provide teaching at level of understanding  - Provide teaching via preferred learning methods  Outcome: Progressing
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
## 2020-01-01 NOTE — ASSESSMENT & PLAN NOTE
Due to cellulitis  Elevated WBC and tachycardic   Rise of temperature at home  Continue current management

## 2020-01-01 NOTE — ASSESSMENT & PLAN NOTE
Outpatient treatment failed with Keflex,  Continue with vancomycin  Improving     Also erythema is receding from the line marked at the time of admission  Follow-up pharmacy consult for vanc trough  Follow-up blood culture, wound culture  Leukocytosis is slightly up today  Recheck in the morning   Still with pain, no dvt on doppler

## 2020-01-01 NOTE — PROGRESS NOTES
Vancomycin Assessment    Jonas Valenzuela is a 35 y o  male who is currently receiving vancomycin 2000 mg IV every 8 hours for skin-soft tissue infection     Relevant clinical data and objective history reviewed:  Creatinine   Date Value Ref Range Status   12/31/2019 0 87 0 60 - 1 30 mg/dL Final     Comment:     Standardized to IDMS reference method   12/30/2019 1 04 0 60 - 1 30 mg/dL Final     Comment:     Standardized to IDMS reference method     /89   Pulse 90   Temp 98 5 °F (36 9 °C)   Resp 18   Ht 5' 8" (1 727 m)   Wt (!) 156 kg (344 lb 12 8 oz)   SpO2 96%   BMI 52 43 kg/m²   I/O last 3 completed shifts: In: 600 [P O :600]  Out: -   Lab Results   Component Value Date/Time    BUN 11 12/31/2019 05:12 AM    WBC 11 36 (H) 01/01/2020 05:03 AM    HGB 14 3 01/01/2020 05:03 AM    HCT 42 2 01/01/2020 05:03 AM    MCV 85 01/01/2020 05:03 AM     01/01/2020 05:03 AM     Temp Readings from Last 3 Encounters:   01/01/20 98 5 °F (36 9 °C)     Vancomycin Days of Therapy: 3    Assessment/Plan  The patient is currently on vancomycin utilizing scheduled dosing  The patient was receiving 1500mg IV Q8hrs with the most recent vancomycin level being at steady-state and sub-therapeutic (10 2) based on a goal of 15-20 (appropriate for most indications) ; therefore, after clinical evaluation will be changed to 2000 mg IV every 8 hours  Pharmacy will continue to follow closely for s/sx of nephrotoxicity, infusion reactions and appropriateness of therapy  BMP and CBC will be ordered per protocol  Plan for trough as patient approaches steady state, prior to the 4th  dose at approximately 1730 on 1/2/19    Pharmacy will continue to follow the patients culture results and clinical progress daily      Harsh Correa, Pharmacist

## 2020-01-02 LAB
ALBUMIN SERPL BCP-MCNC: 2.6 G/DL (ref 3.5–5)
ALP SERPL-CCNC: 102 U/L (ref 46–116)
ALT SERPL W P-5'-P-CCNC: 53 U/L (ref 12–78)
ANION GAP SERPL CALCULATED.3IONS-SCNC: 4 MMOL/L (ref 4–13)
AST SERPL W P-5'-P-CCNC: 36 U/L (ref 5–45)
BILIRUB SERPL-MCNC: 0.78 MG/DL (ref 0.2–1)
BUN SERPL-MCNC: 8 MG/DL (ref 5–25)
CALCIUM SERPL-MCNC: 8.2 MG/DL (ref 8.3–10.1)
CHLORIDE SERPL-SCNC: 105 MMOL/L (ref 100–108)
CO2 SERPL-SCNC: 30 MMOL/L (ref 21–32)
CREAT SERPL-MCNC: 0.71 MG/DL (ref 0.6–1.3)
ERYTHROCYTE [DISTWIDTH] IN BLOOD BY AUTOMATED COUNT: 12.5 % (ref 11.6–15.1)
GFR SERPL CREATININE-BSD FRML MDRD: 117 ML/MIN/1.73SQ M
GLUCOSE SERPL-MCNC: 87 MG/DL (ref 65–140)
HCT VFR BLD AUTO: 39.7 % (ref 36.5–49.3)
HGB BLD-MCNC: 13.6 G/DL (ref 12–17)
MCH RBC QN AUTO: 28.9 PG (ref 26.8–34.3)
MCHC RBC AUTO-ENTMCNC: 34.3 G/DL (ref 31.4–37.4)
MCV RBC AUTO: 84 FL (ref 82–98)
PLATELET # BLD AUTO: 244 THOUSANDS/UL (ref 149–390)
PMV BLD AUTO: 11.2 FL (ref 8.9–12.7)
POTASSIUM SERPL-SCNC: 3.3 MMOL/L (ref 3.5–5.3)
PROT SERPL-MCNC: 7 G/DL (ref 6.4–8.2)
RBC # BLD AUTO: 4.71 MILLION/UL (ref 3.88–5.62)
SODIUM SERPL-SCNC: 139 MMOL/L (ref 136–145)
VANCOMYCIN TROUGH SERPL-MCNC: 17.3 UG/ML (ref 10–20)
WBC # BLD AUTO: 7.38 THOUSAND/UL (ref 4.31–10.16)

## 2020-01-02 PROCEDURE — 80053 COMPREHEN METABOLIC PANEL: CPT | Performed by: FAMILY MEDICINE

## 2020-01-02 PROCEDURE — 85027 COMPLETE CBC AUTOMATED: CPT | Performed by: FAMILY MEDICINE

## 2020-01-02 PROCEDURE — 80202 ASSAY OF VANCOMYCIN: CPT | Performed by: FAMILY MEDICINE

## 2020-01-02 PROCEDURE — 99232 SBSQ HOSP IP/OBS MODERATE 35: CPT | Performed by: FAMILY MEDICINE

## 2020-01-02 RX ORDER — POTASSIUM CHLORIDE 20 MEQ/1
40 TABLET, EXTENDED RELEASE ORAL ONCE
Status: COMPLETED | OUTPATIENT
Start: 2020-01-02 | End: 2020-01-02

## 2020-01-02 RX ADMIN — POTASSIUM CHLORIDE 40 MEQ: 1500 TABLET, EXTENDED RELEASE ORAL at 10:31

## 2020-01-02 RX ADMIN — VANCOMYCIN HYDROCHLORIDE 2000 MG: 1 INJECTION, POWDER, LYOPHILIZED, FOR SOLUTION INTRAVENOUS at 18:32

## 2020-01-02 RX ADMIN — HYDROCHLOROTHIAZIDE 25 MG: 25 TABLET ORAL at 08:12

## 2020-01-02 RX ADMIN — OXYCODONE HYDROCHLORIDE 10 MG: 10 TABLET ORAL at 16:31

## 2020-01-02 RX ADMIN — ENOXAPARIN SODIUM 40 MG: 40 INJECTION SUBCUTANEOUS at 08:12

## 2020-01-02 RX ADMIN — OXYCODONE HYDROCHLORIDE 10 MG: 10 TABLET ORAL at 23:37

## 2020-01-02 RX ADMIN — OXYCODONE HYDROCHLORIDE 10 MG: 10 TABLET ORAL at 10:40

## 2020-01-02 RX ADMIN — OXYCODONE HYDROCHLORIDE 10 MG: 10 TABLET ORAL at 05:54

## 2020-01-02 RX ADMIN — VANCOMYCIN HYDROCHLORIDE 2000 MG: 1 INJECTION, POWDER, LYOPHILIZED, FOR SOLUTION INTRAVENOUS at 02:12

## 2020-01-02 RX ADMIN — VANCOMYCIN HYDROCHLORIDE 2000 MG: 1 INJECTION, POWDER, LYOPHILIZED, FOR SOLUTION INTRAVENOUS at 10:31

## 2020-01-02 NOTE — PLAN OF CARE
Problem: PAIN - ADULT  Goal: Verbalizes/displays adequate comfort level or baseline comfort level  Description  Interventions:  - Encourage patient to monitor pain and request assistance  - Assess pain using appropriate pain scale  - Administer analgesics based on type and severity of pain and evaluate response  - Implement non-pharmacological measures as appropriate and evaluate response  - Consider cultural and social influences on pain and pain management  - Notify physician/advanced practitioner if interventions unsuccessful or patient reports new pain  Outcome: Progressing     Problem: INFECTION - ADULT  Goal: Absence or prevention of progression during hospitalization  Description  INTERVENTIONS:  - Assess and monitor for signs and symptoms of infection  - Monitor lab/diagnostic results  - Monitor all insertion sites, i e  indwelling lines, tubes, and drains  - Monitor endotracheal if appropriate and nasal secretions for changes in amount and color  - [place] appropriate cooling/warming therapies per order  - Administer medications as ordered  - Instruct and encourage patient and family to use good hand hygiene technique  - Identify and instruct in appropriate isolation precautions for identified infection/condition  Outcome: Progressing  Goal: Absence of fever/infection during neutropenic period  Description  INTERVENTIONS:  - Monitor WBC    Outcome: Progressing     Problem: SAFETY ADULT  Goal: Patient will remain free of falls  Description  INTERVENTIONS:  - Assess patient frequently for physical needs  -  Identify cognitive and physical deficits and behaviors that affect risk of falls    -  [place] fall precautions as indicated by assessment   - Educate patient/family on patient safety including physical limitations  - Instruct patient to call for assistance with activity based on assessment  - Modify environment to reduce risk of injury  - Consider OT/PT consult to assist with strengthening/mobility  Outcome: Progressing  Goal: Maintain or return to baseline ADL function  Description  INTERVENTIONS:  -  Assess patient's ability to carry out ADLs; assess patient's baseline for ADL function and identify physical deficits which impact ability to perform ADLs (bathing, care of mouth/teeth, toileting, grooming, dressing, etc )  - Assess/evaluate cause of self-care deficits   - Assess range of motion  - Assess patient's mobility; develop plan if impaired  - Assess patient's need for assistive devices and provide as appropriate  - Encourage maximum independence but intervene and supervise when necessary  - Involve family in performance of ADLs  - Assess for home care needs following discharge   - Consider OT consult to assist with ADL evaluation and planning for discharge  - Provide patient education as appropriate  Outcome: Progressing  Goal: Maintain or return mobility status to optimal level  Description  INTERVENTIONS:  - Assess patient's baseline mobility status (ambulation, transfers, stairs, etc )    - Identify cognitive and physical deficits and behaviors that affect mobility  - Identify mobility aids required to assist with transfers and/or ambulation (gait belt, sit-to-stand, lift, walker, cane, etc )  - [place] fall precautions as indicated by assessment  - Record patient progress and toleration of activity level on Mobility SBAR; progress patient to next Phase/Stage  - Instruct patient to call for assistance with activity based on assessment  - Consider rehabilitation consult to assist with strengthening/weightbearing, etc   Outcome: Progressing     Problem: DISCHARGE PLANNING  Goal: Discharge to home or other facility with appropriate resources  Description  INTERVENTIONS:  - Identify barriers to discharge w/patient and caregiver  - Arrange for needed discharge resources and transportation as appropriate  - Identify discharge learning needs (meds, wound care, etc )  - Arrange for interpretive services to assist at discharge as needed  - Refer to Case Management Department for coordinating discharge planning if the patient needs post-hospital services based on physician/advanced practitioner order or complex needs related to functional status, cognitive ability, or social support system  Outcome: Progressing     Problem: Knowledge Deficit  Goal: Patient/family/caregiver demonstrates understanding of disease process, treatment plan, medications, and discharge instructions  Description  Complete learning assessment and assess knowledge base    Interventions:  - Provide teaching at level of understanding  - Provide teaching via preferred learning methods  Outcome: Progressing

## 2020-01-02 NOTE — ASSESSMENT & PLAN NOTE
Outpatient treatment failed with Keflex,  Continue with vancomycin  Improving     Also erythema is receding from the line marked at the time of admission  Follow-up pharmacy consult for vanc trough  Follow-up blood culture, wound culture  Leukocytosis resolved  Recheck in the morning   Still with pain, no dvt on doppler

## 2020-01-03 ENCOUNTER — TRANSITIONAL CARE MANAGEMENT (OUTPATIENT)
Dept: FAMILY MEDICINE CLINIC | Facility: CLINIC | Age: 40
End: 2020-01-03

## 2020-01-03 VITALS
HEART RATE: 80 BPM | BODY MASS INDEX: 47.74 KG/M2 | DIASTOLIC BLOOD PRESSURE: 87 MMHG | SYSTOLIC BLOOD PRESSURE: 137 MMHG | WEIGHT: 315 LBS | HEIGHT: 68 IN | TEMPERATURE: 98.3 F | RESPIRATION RATE: 18 BRPM | OXYGEN SATURATION: 99 %

## 2020-01-03 LAB
BACTERIA WND AEROBE CULT: ABNORMAL
GRAM STN SPEC: ABNORMAL

## 2020-01-03 PROCEDURE — 99239 HOSP IP/OBS DSCHRG MGMT >30: CPT | Performed by: FAMILY MEDICINE

## 2020-01-03 RX ORDER — CEPHALEXIN 500 MG/1
500 CAPSULE ORAL EVERY 6 HOURS SCHEDULED
Status: DISCONTINUED | OUTPATIENT
Start: 2020-01-03 | End: 2020-01-03 | Stop reason: HOSPADM

## 2020-01-03 RX ORDER — HYDROCHLOROTHIAZIDE 25 MG/1
25 TABLET ORAL DAILY
Qty: 30 TABLET | Refills: 0 | Status: SHIPPED | OUTPATIENT
Start: 2020-01-04 | End: 2020-01-06 | Stop reason: SDUPTHER

## 2020-01-03 RX ORDER — DOXYCYCLINE HYCLATE 100 MG/1
100 CAPSULE ORAL EVERY 12 HOURS SCHEDULED
Qty: 10 CAPSULE | Refills: 0 | Status: SHIPPED | OUTPATIENT
Start: 2020-01-03 | End: 2020-01-06 | Stop reason: SDUPTHER

## 2020-01-03 RX ORDER — OXYCODONE HYDROCHLORIDE 5 MG/1
5 TABLET ORAL EVERY 6 HOURS PRN
Qty: 10 TABLET | Refills: 0 | Status: SHIPPED | OUTPATIENT
Start: 2020-01-03 | End: 2020-01-06

## 2020-01-03 RX ORDER — DOXYCYCLINE HYCLATE 100 MG/1
100 CAPSULE ORAL EVERY 12 HOURS SCHEDULED
Status: DISCONTINUED | OUTPATIENT
Start: 2020-01-03 | End: 2020-01-03 | Stop reason: HOSPADM

## 2020-01-03 RX ORDER — CEPHALEXIN 500 MG/1
500 CAPSULE ORAL 4 TIMES DAILY
Qty: 40 CAPSULE | Refills: 0
Start: 2020-01-03 | End: 2020-01-06 | Stop reason: SDUPTHER

## 2020-01-03 RX ADMIN — ENOXAPARIN SODIUM 40 MG: 40 INJECTION SUBCUTANEOUS at 07:46

## 2020-01-03 RX ADMIN — OXYCODONE HYDROCHLORIDE 10 MG: 10 TABLET ORAL at 07:52

## 2020-01-03 RX ADMIN — HYDROCHLOROTHIAZIDE 25 MG: 25 TABLET ORAL at 07:45

## 2020-01-03 RX ADMIN — VANCOMYCIN HYDROCHLORIDE 2000 MG: 1 INJECTION, POWDER, LYOPHILIZED, FOR SOLUTION INTRAVENOUS at 02:35

## 2020-01-03 RX ADMIN — DOXYCYCLINE HYCLATE 100 MG: 100 CAPSULE ORAL at 11:42

## 2020-01-03 RX ADMIN — CEPHALEXIN 500 MG: 500 CAPSULE ORAL at 11:42

## 2020-01-03 NOTE — PLAN OF CARE
Problem: PAIN - ADULT  Goal: Verbalizes/displays adequate comfort level or baseline comfort level  Description  Interventions:  - Encourage patient to monitor pain and request assistance  - Assess pain using appropriate pain scale  - Administer analgesics based on type and severity of pain and evaluate response  - Implement non-pharmacological measures as appropriate and evaluate response  - Consider cultural and social influences on pain and pain management  - Notify physician/advanced practitioner if interventions unsuccessful or patient reports new pain  Outcome: Progressing     Problem: INFECTION - ADULT  Goal: Absence or prevention of progression during hospitalization  Description  INTERVENTIONS:  - Assess and monitor for signs and symptoms of infection  - Monitor lab/diagnostic results  - Monitor all insertion sites, i e  indwelling lines, tubes, and drains  - Monitor endotracheal if appropriate and nasal secretions for changes in amount and color  - [place] appropriate cooling/warming therapies per order  - Administer medications as ordered  - Instruct and encourage patient and family to use good hand hygiene technique  - Identify and instruct in appropriate isolation precautions for identified infection/condition  Outcome: Progressing  Goal: Absence of fever/infection during neutropenic period  Description  INTERVENTIONS:  - Monitor WBC    Outcome: Progressing     Problem: SAFETY ADULT  Goal: Patient will remain free of falls  Description  INTERVENTIONS:  - Assess patient frequently for physical needs  -  Identify cognitive and physical deficits and behaviors that affect risk of falls    -  [place] fall precautions as indicated by assessment   - Educate patient/family on patient safety including physical limitations  - Instruct patient to call for assistance with activity based on assessment  - Modify environment to reduce risk of injury  - Consider OT/PT consult to assist with strengthening/mobility  Outcome: Progressing  Goal: Maintain or return to baseline ADL function  Description  INTERVENTIONS:  -  Assess patient's ability to carry out ADLs; assess patient's baseline for ADL function and identify physical deficits which impact ability to perform ADLs (bathing, care of mouth/teeth, toileting, grooming, dressing, etc )  - Assess/evaluate cause of self-care deficits   - Assess range of motion  - Assess patient's mobility; develop plan if impaired  - Assess patient's need for assistive devices and provide as appropriate  - Encourage maximum independence but intervene and supervise when necessary  - Involve family in performance of ADLs  - Assess for home care needs following discharge   - Consider OT consult to assist with ADL evaluation and planning for discharge  - Provide patient education as appropriate  Outcome: Progressing  Goal: Maintain or return mobility status to optimal level  Description  INTERVENTIONS:  - Assess patient's baseline mobility status (ambulation, transfers, stairs, etc )    - Identify cognitive and physical deficits and behaviors that affect mobility  - Identify mobility aids required to assist with transfers and/or ambulation (gait belt, sit-to-stand, lift, walker, cane, etc )  - [place] fall precautions as indicated by assessment  - Record patient progress and toleration of activity level on Mobility SBAR; progress patient to next Phase/Stage  - Instruct patient to call for assistance with activity based on assessment  - Consider rehabilitation consult to assist with strengthening/weightbearing, etc   Outcome: Progressing     Problem: DISCHARGE PLANNING  Goal: Discharge to home or other facility with appropriate resources  Description  INTERVENTIONS:  - Identify barriers to discharge w/patient and caregiver  - Arrange for needed discharge resources and transportation as appropriate  - Identify discharge learning needs (meds, wound care, etc )  - Arrange for interpretive services to assist at discharge as needed  - Refer to Case Management Department for coordinating discharge planning if the patient needs post-hospital services based on physician/advanced practitioner order or complex needs related to functional status, cognitive ability, or social support system  Outcome: Progressing     Problem: Knowledge Deficit  Goal: Patient/family/caregiver demonstrates understanding of disease process, treatment plan, medications, and discharge instructions  Description  Complete learning assessment and assess knowledge base    Interventions:  - Provide teaching at level of understanding  - Provide teaching via preferred learning methods  Outcome: Progressing

## 2020-01-03 NOTE — PLAN OF CARE
Problem: PAIN - ADULT  Goal: Verbalizes/displays adequate comfort level or baseline comfort level  Description  Interventions:  - Encourage patient to monitor pain and request assistance  - Assess pain using appropriate pain scale  - Administer analgesics based on type and severity of pain and evaluate response  - Implement non-pharmacological measures as appropriate and evaluate response  - Consider cultural and social influences on pain and pain management  - Notify physician/advanced practitioner if interventions unsuccessful or patient reports new pain  Outcome: Progressing     Problem: INFECTION - ADULT  Goal: Absence or prevention of progression during hospitalization  Description  INTERVENTIONS:  - Assess and monitor for signs and symptoms of infection  - Monitor lab/diagnostic results  - Monitor all insertion sites, i e  indwelling lines, tubes, and drains  - Monitor endotracheal if appropriate and nasal secretions for changes in amount and color  - [place] appropriate cooling/warming therapies per order  - Administer medications as ordered  - Instruct and encourage patient and family to use good hand hygiene technique  - Identify and instruct in appropriate isolation precautions for identified infection/condition  Outcome: Progressing  Goal: Absence of fever/infection during neutropenic period  Description  INTERVENTIONS:  - Monitor WBC    Outcome: Progressing     Problem: SAFETY ADULT  Goal: Patient will remain free of falls  Description  INTERVENTIONS:  - Assess patient frequently for physical needs  -  Identify cognitive and physical deficits and behaviors that affect risk of falls    -  [place] fall precautions as indicated by assessment   - Educate patient/family on patient safety including physical limitations  - Instruct patient to call for assistance with activity based on assessment  - Modify environment to reduce risk of injury  - Consider OT/PT consult to assist with strengthening/mobility  Outcome: Progressing  Goal: Maintain or return to baseline ADL function  Description  INTERVENTIONS:  -  Assess patient's ability to carry out ADLs; assess patient's baseline for ADL function and identify physical deficits which impact ability to perform ADLs (bathing, care of mouth/teeth, toileting, grooming, dressing, etc )  - Assess/evaluate cause of self-care deficits   - Assess range of motion  - Assess patient's mobility; develop plan if impaired  - Assess patient's need for assistive devices and provide as appropriate  - Encourage maximum independence but intervene and supervise when necessary  - Involve family in performance of ADLs  - Assess for home care needs following discharge   - Consider OT consult to assist with ADL evaluation and planning for discharge  - Provide patient education as appropriate  Outcome: Progressing  Goal: Maintain or return mobility status to optimal level  Description  INTERVENTIONS:  - Assess patient's baseline mobility status (ambulation, transfers, stairs, etc )    - Identify cognitive and physical deficits and behaviors that affect mobility  - Identify mobility aids required to assist with transfers and/or ambulation (gait belt, sit-to-stand, lift, walker, cane, etc )  - [place] fall precautions as indicated by assessment  - Record patient progress and toleration of activity level on Mobility SBAR; progress patient to next Phase/Stage  - Instruct patient to call for assistance with activity based on assessment  - Consider rehabilitation consult to assist with strengthening/weightbearing, etc   Outcome: Progressing     Problem: DISCHARGE PLANNING  Goal: Discharge to home or other facility with appropriate resources  Description  INTERVENTIONS:  - Identify barriers to discharge w/patient and caregiver  - Arrange for needed discharge resources and transportation as appropriate  - Identify discharge learning needs (meds, wound care, etc )  - Arrange for interpretive services to assist at discharge as needed  - Refer to Case Management Department for coordinating discharge planning if the patient needs post-hospital services based on physician/advanced practitioner order or complex needs related to functional status, cognitive ability, or social support system  Outcome: Progressing     Problem: Knowledge Deficit  Goal: Patient/family/caregiver demonstrates understanding of disease process, treatment plan, medications, and discharge instructions  Description  Complete learning assessment and assess knowledge base    Interventions:  - Provide teaching at level of understanding  - Provide teaching via preferred learning methods  Outcome: Progressing     Problem: METABOLIC, FLUID AND ELECTROLYTES - ADULT  Goal: Electrolytes maintained within normal limits  Description  INTERVENTIONS:  - Monitor labs and assess patient for signs and symptoms of electrolyte imbalances  - Administer electrolyte replacement as ordered  - Monitor response to electrolyte replacements, including repeat lab results as appropriate  - Instruct patient on fluid and nutrition as appropriate  Outcome: Progressing  Goal: Fluid balance maintained  Description  INTERVENTIONS:  - Monitor labs   - Monitor I/O and WT  - Instruct patient on fluid and nutrition as appropriate  - Assess for signs & symptoms of volume excess or deficit  Outcome: Progressing  Goal: Glucose maintained within target range  Description  INTERVENTIONS:  - Monitor Blood Glucose as ordered  - Assess for signs and symptoms of hyperglycemia and hypoglycemia  - Administer ordered medications to maintain glucose within target range  - Assess nutritional intake and initiate nutrition service referral as needed  Outcome: Progressing     Problem: SKIN/TISSUE INTEGRITY - ADULT  Goal: Skin integrity remains intact  Description  INTERVENTIONS  - Identify patients at risk for skin breakdown  - Assess and monitor skin integrity  - Assess and monitor nutrition and hydration status  - Monitor labs (i e  albumin)  - Assess for incontinence   - Turn and reposition patient  - Assist with mobility/ambulation  - Relieve pressure over bony prominences  - Avoid friction and shearing  - Provide appropriate hygiene as needed including keeping skin clean and dry  - Evaluate need for skin moisturizer/barrier cream  - Collaborate with interdisciplinary team (i e  Nutrition, Rehabilitation, etc )   - Patient/family teaching  Outcome: Progressing  Goal: Incision(s), wounds(s) or drain site(s) healing without S/S of infection  Description  INTERVENTIONS  - Assess and document risk factors for skin impairment   - Assess and document dressing, incision, wound bed, drain sites and surrounding tissue  - Consider nutrition services referral as needed  - Oral mucous membranes remain intact  - Provide patient/ family education  Outcome: Progressing  Goal: Oral mucous membranes remain intact  Description  INTERVENTIONS  - Assess oral mucosa and hygiene practices  - Implement preventative oral hygiene regimen  - Implement oral medicated treatments as ordered  - Initiate Nutrition services referral as needed  Outcome: Progressing

## 2020-01-03 NOTE — UTILIZATION REVIEW
Continued Stay Review    Date: 1/3/20                     Current Patient Class: Inpatient Current Level of Care: Med/surg    HPI:39 y o  male initially admitted on 12/30    Assessment/Plan:   Erythema receding from line marked at time of admission  Continue with Iv antibiotics  Follow up on blood and wound culture      cs    Pertinent Labs/Diagnostic Results:     Results from last 7 days   Lab Units 01/02/20  0552 01/01/20  0503 12/31/19  0512 12/30/19  1341   WBC Thousand/uL 7 38 11 36* 10 18* 11 10*   HEMOGLOBIN g/dL 13 6 14 3 13 4 14 9   HEMATOCRIT % 39 7 42 2 39 4 43 6   PLATELETS Thousands/uL 244 247 229 221   NEUTROS ABS Thousands/µL  --   --  6 38 8 08*         Results from last 7 days   Lab Units 01/02/20  0552 12/31/19  0512 12/30/19  1341   SODIUM mmol/L 139 137 137   POTASSIUM mmol/L 3 3* 3 5 3 3*   CHLORIDE mmol/L 105 103 102   CO2 mmol/L 30 26 29   ANION GAP mmol/L 4 8 6   BUN mg/dL 8 11 12   CREATININE mg/dL 0 71 0 87 1 04   EGFR ml/min/1 73sq m 117 108 89   CALCIUM mg/dL 8 2* 8 2* 8 8     Results from last 7 days   Lab Units 01/02/20  0552 12/31/19  0512 12/30/19  1341   AST U/L 36 20 27   ALT U/L 53 32 41   ALK PHOS U/L 102 92 110   TOTAL PROTEIN g/dL 7 0 6 9 8 0   ALBUMIN g/dL 2 6* 2 5* 3 1*   TOTAL BILIRUBIN mg/dL 0 78 1 01* 1 33*         Results from last 7 days   Lab Units 01/02/20  0552 12/31/19  0512 12/30/19  1341   GLUCOSE RANDOM mg/dL 87 96 92     Results from last 7 days   Lab Units 12/30/19  1341   CK TOTAL U/L 108     Results from last 7 days   Lab Units 12/30/19  1541   LACTIC ACID mmol/L 1 1       Results from last 7 days   Lab Units 12/30/19  1541   HEP B S AG  Non-reactive   HEP C AB  Non-reactive   HEP B C IGM  Non-reactive         Results from last 7 days   Lab Units 12/30/19  1543   GRAM STAIN RESULT  No Polys or Bacteria seen   WOUND CULTURE  Growth in Broth culture only Staphylococcus coagulase negative*     Vital Signs:   Date/Time  Temp Pulse Resp BP MAP (mmHg) SpO2 O2 Device 01/03/20 07:09:59  98 3 °F (36 8 °C) 80 18 137/87 104 99 % --   01/02/20 2337  -- -- -- -- -- -- None (Room air)   01/02/20 2300  -- 81 -- -- -- 98 % --   01/02/20 22:02:55  97 7 °F (36 5 °C) 79 18 131/83 99 98 % --   01/02/20 1600  -- -- -- -- -- -- None (Room air)   01/02/20 15:16:10  98 4 °F (36 9 °C) 80 18 131/86 101 99 % None (Room air)   01/02/20 0814  -- -- -- -- -- 99 % None (Room air)   01/02/20 06:59:29  98 1 °F (36 7 °C) 78 18 135/82 100 99 % --   01/01/20 23:44:05  98 1 °F (36 7 °C) 77 16 134/88 103 98 % --   01/01/20 16:11:44  98 5 °F (36 9 °C) 84 18 140/90 107 98 % None (Room air)   01/01/20 08:06:26  98 5 °F (36 9 °C) 90 18 141/89 106 96 % --   01/01/20 07:14:57  98 6 °F (37 °C) 89 17 142/91        Medications:   Scheduled Medications:    Medications:  cephalexin 500 mg Oral Q6H [address]   docusate sodium 100 mg Oral BID   doxycycline hyclate 100 mg Oral Q12H TRINI   enoxaparin 40 mg Subcutaneous Daily   hydrochlorothiazide 25 mg Oral Daily   senna 1 tablet Oral Daily     Continuous IV Infusions:     PRN Meds:    acetaminophen 650 mg Oral Q6H PRN   calcium carbonate 1,000 mg Oral Daily PRN   HYDROmorphone 0 5 mg Intravenous Q1H PRN   ondansetron 4 mg Intravenous Q6H PRN   oxyCODONE 10 mg Oral Q4H PRN   oxyCODONE 5 mg Oral Q4H PRN       Discharge Plan:1/3/20    Network Utilization Review Department  [email or web address]  ATTENTION: Please call with any questions or concerns to [phone] and carefully listen to the prompts so that you are directed to the right person  All voicemails are confidential   Chrystine Can all requests for admission clinical reviews, approved or denied determinations and any other requests to dedicated fax number below belonging to the campus where the patient is receiving treatment   List of dedicated fax numbers for the Facilities:  [address] [place] [address] DENIALS (Administrative/Medical Necessity) [phone]   [address] (Maternity/NICU/Pediatrics) [phone]   [facility] [phone]   Aishwarya Stanley [phone]   Chetan Clayton [phone]   [facility] [address] [facility] [phone]   [facility]  [phone]   [address] [facility] [address] [facility]  [address] [facility] [address] Toshia Garcia [phone]

## 2020-01-03 NOTE — DISCHARGE INSTRUCTIONS
Irrigate with normal saline and pat dry  Use a large sheet of adaptic and place over the blister and satellite lesion  Cover with large sheet of Maxorb Ag then 4x4 gauze and secure with araceli and paper tape   Change every other day or as needed for strike through drainage  Keep the affected lower extremity elevated as per status possible, apply lotion to the leg to keep it moist    Follow-up with the primary care physician within a week

## 2020-01-03 NOTE — PROGRESS NOTES
Vancomycin IV Pharmacy-to-Dose Consultation    Khalida Arias is a 35 y o  male who is currently receiving Vancomycin IV with management by the Pharmacy Consult service  Assessment/Plan:  The patient was reviewed  Renal function is stable and no signs or symptoms of nephrotoxicity and/or infusion reactions were documented in the chart  Based on todays assessment, continue current vancomycin (day # 4) dosing of 2000 mg iv q 8 hrs, with a plan for trough to be drawn at 0930 on 1/4/20  We will continue to follow the patients culture results and clinical progress daily      Khushbu Galaviz, Pharmacist

## 2020-01-03 NOTE — NURSING NOTE
Pt meets discharge criteria  Reviewed wound care, exit care, and discharge instructions; pt verbalized understanding  All questions answered  Pt given paperwork  Pt has belongings

## 2020-01-05 PROBLEM — E87.6 HYPOKALEMIA: Status: RESOLVED | Noted: 2019-12-30 | Resolved: 2020-01-05

## 2020-01-05 PROBLEM — A41.9 SEPSIS DUE TO CELLULITIS (HCC): Status: RESOLVED | Noted: 2019-12-30 | Resolved: 2020-01-05

## 2020-01-05 PROBLEM — L03.90 SEPSIS DUE TO CELLULITIS (HCC): Status: RESOLVED | Noted: 2019-12-30 | Resolved: 2020-01-05

## 2020-01-06 ENCOUNTER — OFFICE VISIT (OUTPATIENT)
Dept: FAMILY MEDICINE CLINIC | Facility: CLINIC | Age: 40
End: 2020-01-06
Payer: COMMERCIAL

## 2020-01-06 VITALS
WEIGHT: 315 LBS | HEIGHT: 68 IN | HEART RATE: 95 BPM | DIASTOLIC BLOOD PRESSURE: 88 MMHG | OXYGEN SATURATION: 98 % | BODY MASS INDEX: 47.74 KG/M2 | SYSTOLIC BLOOD PRESSURE: 128 MMHG

## 2020-01-06 DIAGNOSIS — Z09 HOSPITAL DISCHARGE FOLLOW-UP: Primary | ICD-10-CM

## 2020-01-06 DIAGNOSIS — Z13.1 SCREENING FOR DIABETES MELLITUS: ICD-10-CM

## 2020-01-06 DIAGNOSIS — I10 ESSENTIAL HYPERTENSION: ICD-10-CM

## 2020-01-06 DIAGNOSIS — E66.01 MORBID OBESITY WITH BMI OF 50.0-59.9, ADULT (HCC): ICD-10-CM

## 2020-01-06 DIAGNOSIS — L03.115 CELLULITIS OF RIGHT LOWER EXTREMITY: ICD-10-CM

## 2020-01-06 LAB — SL AMB POCT HEMOGLOBIN AIC: 5.1 (ref ?–6.5)

## 2020-01-06 PROCEDURE — 83036 HEMOGLOBIN GLYCOSYLATED A1C: CPT | Performed by: NURSE PRACTITIONER

## 2020-01-06 PROCEDURE — 99496 TRANSJ CARE MGMT HIGH F2F 7D: CPT | Performed by: NURSE PRACTITIONER

## 2020-01-06 RX ORDER — DOXYCYCLINE HYCLATE 100 MG/1
100 CAPSULE ORAL EVERY 12 HOURS SCHEDULED
Qty: 28 CAPSULE | Refills: 0 | Status: SHIPPED | OUTPATIENT
Start: 2020-01-06 | End: 2020-01-20

## 2020-01-06 RX ORDER — CEPHALEXIN 500 MG/1
500 CAPSULE ORAL 4 TIMES DAILY
Qty: 40 CAPSULE | Refills: 0
Start: 2020-01-06 | End: 2020-01-20

## 2020-01-06 RX ORDER — HYDROCHLOROTHIAZIDE 25 MG/1
25 TABLET ORAL DAILY
Qty: 30 TABLET | Refills: 2 | Status: SHIPPED | OUTPATIENT
Start: 2020-01-06 | End: 2020-02-12 | Stop reason: SDUPTHER

## 2020-01-06 NOTE — PATIENT INSTRUCTIONS
You may receive a survey in the mail, or by e-mail, please fill it out, and let us know how we did! Please remember to sign up for your TimeBridge tana to check you lab results, send us messages, and schedule appointments  If you have questions about the TimeBridge option, please call us! Thank you again for choosing [facility]!

## 2020-01-06 NOTE — PROGRESS NOTES
BMI Counseling: Body mass index is 52 09 kg/m²  The BMI is above normal  Nutrition recommendations include encouraging healthy choices of fruits and vegetables and consuming healthier snacks  Assessment/Plan:         Diagnoses and all orders for this visit:    Hospital discharge follow-up    Cellulitis of right lower extremity  -     doxycycline hyclate (VIBRAMYCIN) 100 mg capsule; Take 1 capsule (100 mg total) by mouth every 12 (twelve) hours for 14 days  -     cephalexin (KEFLEX) 500 mg capsule; Take 1 capsule (500 mg total) by mouth 4 (four) times a day for 14 days Starting on Saturday for 10 days    Screening for diabetes mellitus  -     POCT hemoglobin A1c    Essential hypertension  -     hydrochlorothiazide (HYDRODIURIL) 25 mg tablet; Take 1 tablet (25 mg total) by mouth daily    Morbid obesity with BMI of 50 0-59 9, adult ([facility] [address] )      Extended the dual antibiotic therapy x 2 more weeks  It is healing well per patient and per photo in his hospital chart from wound care  Blister has diminished greatly in size also  Discussed eating healthy to promote healing  May take a probiotic due to extended abx therapy or eat yogurt with one included in it  Subjective:      Patient ID: Jonas Valenzuela is a 35 y o  male  Here today as a new patient for a TCM  Was admitted to the hospital for right lower leg cellulitis on 12/30/2019, and discharged on 01/03/2020  Was sent home on dual abx therapy for a noted staph infection to wound with accompanying blister  Reports minimal pain at present  States his wound is much improved  In-office HA1C was 5 1  Hx of mother with diabetes, and father with HTN and kidney disease  He was placed on HCTZ in the hospital due to elevated BP, continues with the medication today and his BP is within parameters  Hx of elevated bilirubin in-hospital which resolved  Hx of elevated BMI of 52 09         The following portions of the patient's history were reviewed and updated as appropriate: allergies, current medications, past family history, past medical history, past social history, past surgical history and problem list     Review of Systems   Constitutional: Negative  Respiratory: Negative  Cardiovascular: Negative  Skin: Positive for wound  Please see HPI  All other systems reviewed and are negative  Objective:      /88 (BP Location: Left arm, Patient Position: Sitting, Cuff Size: Large)   Pulse 95   Ht 5' 8" (1 727 m)   Wt (!) 155 kg (342 lb 9 5 oz)   SpO2 98%   BMI 52 09 kg/m²          Physical Exam   Constitutional: He is oriented to person, place, and time  He appears well-developed and well-nourished  HENT:   Head: Normocephalic and atraumatic  Neck: Neck supple  Cardiovascular: Normal rate, regular rhythm and normal heart sounds  Exam reveals no gallop and no friction rub  No murmur heard  Pulmonary/Chest: Effort normal and breath sounds normal  No stridor  No respiratory distress  He has no wheezes  Neurological: He is alert and oriented to person, place, and time  Skin: Skin is warm and dry  Right lower leg area with mild erythema, some serous drainage noted, blister intact approximately 0 5 cm by 1 cm in size

## 2020-01-06 NOTE — PROGRESS NOTES
TCM Call (since 12/6/2019)     Date and time call was made  1/3/2020 10:21 AM    Hospital care reviewed  Records reviewed    Patient was hospitialized at  Other (comment)    Comment  [facility]    Date of Admission  12/30/19    Date of discharge  01/03/20    Disposition  Home    Were the patients medications reviewed and updated  Yes      TCM Call (since 12/6/2019)     Should patient be enrolled in anticoag monitoring? No    Did you obtain your prescribed medications  Yes    Do you need help managing your prescriptions or medications  No    Is transportation to your appointment needed  No    I have advised the patient to call PCP with any new or worsening symptoms  [address]; Spouse or Significiant other    Support System  Spouse;  Children; Family    The type of support provided  Emotional; Financial; Physical    Do you have social support  Yes, as much as I need    Are you recieving any outpatient services  No    Are you recieving home care services  No    Are you using any community resources  No    Interperter language line needed  No

## 2020-01-06 NOTE — ASSESSMENT & PLAN NOTE
Unknown etiology  Minimal elevation of the bilirubin noted  No transaminitis    Ultrasound reviewed  Resolved

## 2020-01-06 NOTE — UTILIZATION REVIEW
Notification of Discharge  This is a Notification of Discharge from our facility [address]  Please be advised that this patient has been discharge from our facility  Below you will find the admission and discharge date and time including the patients disposition  PRESENTATION DATE: 12/30/2019 11:54 AM  OBS ADMISSION DATE:   IP ADMISSION DATE: 12/30/19 1432   DISCHARGE DATE: 1/3/2020  1:15 PM  DISPOSITION: Home/Self Care Home/Self Care   Admission Orders listed below:  Admission Orders (From admission, onward)     Ordered        12/30/19 1432  Inpatient Admission  Once                   Please contact the UR Department if additional information is required to close this patient's authorization/case  [address] Utilization Review Department  Main: [phone] x carefully listen to the prompts  All voicemails are confidential   [email or web address]  Send all requests for admission clinical reviews, approved or denied determinations and any other requests to dedicated fax number below belonging to the campus where the patient is receiving treatment   List of dedicated fax numbers:  [address] DENIALS (Administrative/Medical Necessity) [phone]   [address] (Maternity/NICU/Pediatrics) [phone]   Kevin Bentley [phone]   [facility] [phone]   [facility] [phone]   [facility] [address] [place] [address] [phone]   [facility]  [phone]   [address] [facility] [address] [facility]  [address] [place] [address] [address] [facility] [phone]

## 2020-01-06 NOTE — ASSESSMENT & PLAN NOTE
Outpatient treatment failed with Keflex,  Cellulitis improved with IV vancomycin  Pain improved  Will discharge home with doxycycline and Keflex to finish the course  Follow-up with the PCP as an outpatient  Continue with the wound care

## 2020-01-06 NOTE — DISCHARGE SUMMARY
Discharge- Indu Gaona 1980, 35 y o  male MRN: [id number]    Unit/Bed#: -01 Encounter: [id number]    Primary Care Provider: No primary care provider on file  Date and time admitted to hospital: 12/30/2019 11:54 AM        Super obesity  Assessment & Plan  Low fat low salt diet  Nutritional consult  TLC son outpatient  Hemoglobin A1c as an outpatient    Essential hypertension  Assessment & Plan  Blood pressure appears to be better controlled when compared to yesterday  Monitor    Elevated bilirubin  Assessment & Plan  Unknown etiology  Minimal elevation of the bilirubin noted  No transaminitis  Ultrasound reviewed  Resolved    * Cellulitis of right lower extremity  Assessment & Plan  Outpatient treatment failed with Keflex,  Cellulitis improved with IV vancomycin  Pain improved  Will discharge home with doxycycline and Keflex to finish the course  Follow-up with the PCP as an outpatient  Continue with the wound care    Sepsis due to cellulitis (HCC)-resolved as of 1/5/2020  Assessment & Plan  Resolved      Discharging Physician / Practitioner: Jorge A Bryant MD  PCP: No primary care provider on file  Admission Date:   Admission Orders (From admission, onward)     Ordered        12/30/19 1432  Inpatient Admission  Once                   Discharge Date: 1/3/2020    Resolved Problems  Date Reviewed: 1/5/2020          Resolved    Hypokalemia 1/5/2020     Resolved by  Jorge A Bryant MD    Sepsis due to cellulitis [facility]) 1/5/2020     Resolved by  Jorge A Bryant MD          Consultations During Hospital Stay:  ·  None    Procedures Performed:   ·     Significant Findings / Test Results:   · Ultrasound of the abdomen-hepatic steatosis  Normal gallbladder  · Lower extremity arterial Doppler-no DVT    Right lower extremity in going to limb note  · X-ray of the tibia and fibula-no acute osseus abnormality    Incidental Findings:   ·     Test Results Pending at Discharge (will require follow up):   · Outpatient Tests Requested:  · Hemoglobin D4M    Complications:   none    Reason for Admission:  Cellulitis failed outpatient treatment    Hospital Course:     Taryn Alejandra is a 35 y o  male patient who originally presented to the hospital on 12/30/2019 due to right lower extremity cellulitis  Patient was started on Keflex as an outpatient through his primary care physician and he had 2 days worth of antibiotics as an outpatient without any significant improvement  Patient presented to the hospital and was started on intravenous vancomycin with improvement in the erythematous area  Patient developed 1 blister from the swelling  Wound care evaluated the patient and gave the recommendation  Patient cellulitic area significantly improved  Blister stayed intact  Patient remained afebrile and hemodynamically stable  Blood culture remained negative  Doppler was negative for any DVT  Patient also had mild elevation in bilirubin on an ultrasound of the abdomen was done  Patient with hepatic steatosis but no significant gallbladder pathology  Patient is also morbidly obese with a BMI of 52  Recommended TLC as an outpatient  Patient remained hemodynamically stable and afebrile  Patient was discharged in a stable condition with outpatient follow-up with the primary care physician on 1/3/2020  Recommended to get hemoglobin A1c as an outpatient  For details refer to the chart        Please see above list of diagnoses and related plan for additional information  Condition at Discharge: good     Discharge Day Visit / Exam:     Subjective:  Patient seen and examined  Reported that the pain is significantly better    Patient is requesting short-term supply of pain medication until his cellulitis is improved  Vitals: Blood Pressure: 137/87 (01/03/20 0709)  Pulse: 80 (01/03/20 0709)  Temperature: 98 3 °F (36 8 °C) (01/03/20 0709)  Temp Source: Oral (01/02/20 1516)  Respirations: 18 (01/03/20 0709)  Height: 5' 8" (172 7 cm) (12/31/19 1114)  Weight - Scale: (!) 156 kg (344 lb 5 7 oz) (01/03/20 0555)  SpO2: 99 % (01/03/20 0709)  Exam:   Physical Exam   Constitutional: He appears well-developed  No distress  HENT:   Head: Normocephalic  Eyes: Right eye exhibits no discharge  Left eye exhibits no discharge  Neck: No JVD present  Cardiovascular: Normal rate and regular rhythm  No murmur heard  Pulmonary/Chest: Effort normal  No respiratory distress  Abdominal: Soft  Musculoskeletal: Normal range of motion  Right lower extremity erythema and edema is significantly better  Blister present       Discussion with Family:  Family updated in the room    Discharge instructions/Information to patient and family:   See after visit summary for information provided to patient and family  Provisions for Follow-Up Care:  See after visit summary for information related to follow-up care and any pertinent home health orders  Disposition:     Home    For Discharges to [facility] SNF:   · Not Applicable to this Patient - Not Applicable to this Patient    Planned Readmission:  none     Discharge Statement:  I spent  45 minutes discharging the patient  This time was spent on the day of discharge  I had direct contact with the patient on the day of discharge  Greater than 50% of the total time was spent examining patient, answering all patient questions, arranging and discussing plan of care with patient as well as directly providing post-discharge instructions  Additional time then spent on discharge activities  Discharge Medications:  See after visit summary for reconciled discharge medications provided to patient and family        ** Please Note: This note has been constructed using a voice recognition system **

## 2020-01-06 NOTE — UTILIZATION REVIEW
Notification of Inpatient Admission/Inpatient Authorization Request   This is a Notification of Inpatient Admission for [address]  Be advised that this patient was admitted to our facility under Inpatient Status  Contact Cynthia Whyte at [phone] for additional admission information  [facility] [address] [facility] DEPT  DEDICATED [phone]  Patient Name:   Sandra Edmonds   YOB: 1980       [address]   [address]:   [address]  Tax ID: [id number]  NPI: [id number] Attending Provider/NPI: Sophy Gomez Md [[id number]]   Attending Physician:  TAMAR San  [facility] Practioner ID- [id number]  [address] [place] [address]  Phone 1: [phone]  Fax: [phone]     Place of Service Code: 24     Place of Service Name:  [address] [place] [address]   Start Date: 12/30/19 1432     Discharge Date & Time: 1/3/2020  1:15 PM    Type of Admission: Inpatient Status Discharge Disposition (if discharged): Home/Self Care   Patient Diagnoses: Cellulitis [L03 90]  Cellulitis of right lower extremity [O72 747]  Leg edema, right [R60 0]     Orders: Admission Orders (From admission, onward)     Ordered        12/30/19 1432  Inpatient Admission  Once                    Assigned Utilization Review Contact: Cynthia Whyte  Utilization   Network Utilization Review Department  Phone: [phone]; Fax [phone]  Email: [email or web address]   ATTENTION PAYERS: Please call the assigned Utilization  directly with any questions or concerns ALL voicemails in the department are confidential  Send all requests for admission clinical reviews, approved or denied determinations and any other requests to dedicated fax number belonging to the campus where the patient is receiving treatment      Initial Clinical Review    Admission: Date/Time/Statement: Inpatient Admission Orders (From admission, onward)     Ordered        12/30/19 1432  Inpatient Admission  Once                   Orders Placed This Encounter   Procedures    Inpatient Admission     Standing Status:   Standing     Number of Occurrences:   1     Order Specific Question:   Admitting Physician     Answer:   Radha Hilliard [[id number]]     Order Specific Question:   Level of Care     Answer:   Med Surg [16]     Order Specific Question:   Estimated length of stay     Answer:   More than 2 Midnights     Order Specific Question:   Certification     Answer:   I certify that inpatient services are medically necessary for this patient for a duration of greater than two midnights  See H&P and MD Progress Notes for additional information about the patient's course of treatment  ED Arrival Information     Expected Arrival Acuity Means of Arrival Escorted By Service Admission Type    - 12/30/2019 11:49 Urgent Walk-In Self Hospitalist Urgent    Arrival Complaint    Leg rash        Chief Complaint   Patient presents with    Cellulitis     Pt states since friday he noticed R sided calf pain/redness/swelling, pt had N/V/D and went to [facility] where the pt tested negative for flu however was dx with cellulitis in the R calf and was perscribed abx  Pt states he has been taking abx however no improvement  Assessment/Plan: 35year old male to the ED from home with complaints of right lower extremity swelling and pain for 3-4 days  Seen at urgent care 3 days prior, diagnosed with cellulitis of that extremity, prescribed KEflex which he has been taking and returns with worsening redniess and pin of entire loer leg, fever of 102 on day of arrival   Admitted to inpatient for cellulitis of right lower extremity, hypertention, hypokalemia     He has dusky red discoloration of the majority of right lower extremity between the ankle na knee with 2 blisters draining serosanguineous fluids and associated erythema consistent with cellulitis which is warm to touch  Pedal pulses are 2+  IV antibiotics initiated for cellulitis, failed ouptatient therapy  US - for DVT  Supplement potassium and recheck  WOund care consult  Wound care consult 12/31:  Right lower extremity: skin is dark red/purple and hot to touch in comparison to the LLE  There is a bullae present on the medial aspect of the leg just above the malleolus that is draining but still has large amount of fluctuance and roof is still mostly intact  A small satellite lesion that is draining to the left of the bullae is present  Drainage is small to moderate amount of serous fluid  Irrigate with normal saline, pat dry, use adaptic over blister, cover with maxorb  ED Triage Vitals [12/30/19 1159]   Temperature Pulse Respirations Blood Pressure SpO2   97 8 °F (36 6 °C) (!) 107 18 (!) 189/108 98 %      Temp Source Heart Rate Source Patient Position - Orthostatic VS BP Location FiO2 (%)   Oral Monitor Lying Right arm --      Pain Score       Worst Possible Pain          Wt Readings from Last 1 Encounters:   01/06/20 (!) 155 kg (342 lb 9 5 oz)     Additional Vital Signs:   Date/Time Temp Pulse Resp BP MAP (mmHg) SpO2 O2 Device   12/31/19 0726 97 9 °F (36 6 °C) 75 17 132/74 -- 98 % None (Room air)   12/31/19 0310 -- -- -- -- -- -- None (Room air)   12/30/19 23:56:41 98 8 °F (37 1 °C) 82 18 136/86 103 99 % --   12/30/19 1905 -- -- -- 160/72 -- -- --   12/30/19 1841 99 9 °F (37 7 °C) 101 22 154/109Abnormal  124 98 % --   12/30/19 1706 99 7 °F (37 6 °C) 98 20 151/94 -- 100 % None (Room air)   12/30/19 1430 -- 91 18 179/102Abnormal  -- 99 % None (Room air)   12/30/19 1330 -- 97 18 178/100Abnormal  -- 99 % None (Room air)   12/30/19 1230 -- 92 18 172/98Abnormal  -- 100 % None (Room air)   12/30/19 1159 97 8 °F (36 6 °C) 107Abnormal  18 189/108Abnormal         Pertinent Labs/Diagnostic Test Results:   US abdomen 12/30:  Hepatic steatosis  Normal gallbladder    XRay tib/fib 12/30: No acute osseous abnormality  VAS lower limb venous duplex 12/30:  CONCLUSION:     Impression:  RIGHT LOWER LIMB  No evidence of acute or chronic deep vein thrombosis   No evidence of superficial thrombophlebitis noted  Doppler evaluation shows a normal response to augmentation maneuvers  Popliteal, posterior tibial and anterior tibial arterial Doppler waveforms are  triphasic  Tech Note:  There is an echogenic structure located in the inguinal region  This structure  measures approximately 1 21 x 2 18 cm and is consistent with enlarged lymph node  and channels  LEFT LOWER LIMB LIMITED  Evaluation shows no evidence of thrombus in the common femoral vein  Doppler evaluation shows a normal response to augmentation maneuvers    Results from last 7 days   Lab Units 01/02/20  0552 01/01/20  0503 12/31/19  0512 12/30/19  1341   WBC Thousand/uL 7 38 11 36* 10 18* 11 10*   HEMOGLOBIN g/dL 13 6 14 3 13 4 14 9   HEMATOCRIT % 39 7 42 2 39 4 43 6   PLATELETS Thousands/uL 244 247 229 221   NEUTROS ABS Thousands/µL  --   --  6 38 8 08*         Results from last 7 days   Lab Units 01/02/20  0552 12/31/19  0512 12/30/19  1341   SODIUM mmol/L 139 137 137   POTASSIUM mmol/L 3 3* 3 5 3 3*   CHLORIDE mmol/L 105 103 102   CO2 mmol/L 30 26 29   ANION GAP mmol/L 4 8 6   BUN mg/dL 8 11 12   CREATININE mg/dL 0 71 0 87 1 04   EGFR ml/min/1 73sq m 117 108 89   CALCIUM mg/dL 8 2* 8 2* 8 8     Results from last 7 days   Lab Units 01/02/20  0552 12/31/19  0512 12/30/19  1341   AST U/L 36 20 27   ALT U/L 53 32 41   ALK PHOS U/L 102 92 110   TOTAL PROTEIN g/dL 7 0 6 9 8 0   ALBUMIN g/dL 2 6* 2 5* 3 1*   TOTAL BILIRUBIN mg/dL 0 78 1 01* 1 33*         Results from last 7 days   Lab Units 01/02/20  0552 12/31/19  0512 12/30/19  1341   GLUCOSE RANDOM mg/dL 87 96 92       Results from last 7 days   Lab Units 12/30/19  1341   CK TOTAL U/L 108       Results from last 7 days   Lab Units 12/30/19  1541   LACTIC ACID mmol/L 1 1 Results from last 7 days   Lab Units 12/30/19  1541   HEP B S AG  Non-reactive   HEP C AB  Non-reactive   HEP B C IGM  Non-reactive       Results from last 7 days   Lab Units 12/30/19  1543   GRAM STAIN RESULT  No Polys or Bacteria seen   WOUND CULTURE  Growth in Broth culture only Staphylococcus coagulase negative*     ED Treatment:   Medication Administration from 12/30/2019 1148 to 12/30/2019 1835       Date/Time Order Dose Route Action     12/30/2019 1342 vancomycin (VANCOCIN) 1,500 mg in sodium chloride 0 9 % 250 mL IVPB 1,500 mg Intravenous New Bag     12/30/2019 1533 potassium chloride (K-DUR,KLOR-CON) CR tablet 20 mEq 20 mEq Oral Given        Past Medical History:   Diagnosis Date    Hypertension        Admitting Diagnosis: Cellulitis [L03 90]  Cellulitis of right lower extremity [L03 115]  Leg edema, right [R60 0]  Age/Sex: 35 y o  male  Admission Orders:    Scheduled Medications:    No current facility-administered medications for this encounter  Continuous IV Infusions:    No current facility-administered medications for this encounter  PRN Meds:    acetaminophen 650 mg Oral Q6H PRN   calcium carbonate 1,000 mg Oral Daily PRN   HYDROmorphone 0 5 mg Intravenous Q1H PRN   ondansetron 4 mg Intravenous Q6H PRN   oxyCODONE 10 mg Oral Q4H PRNx1   oxyCODONE 5 mg Oral Q4H PRN       IP CONSULT TO WOUND CARE    Network Utilization Review Department  [email or web address]  ATTENTION: Please call with any questions or concerns to [phone] and carefully listen to the prompts so that you are directed to the right person  All voicemails are confidential   Alexandra Maldonado all requests for admission clinical reviews, approved or denied determinations and any other requests to dedicated fax number below belonging to the campus where the patient is receiving treatment   List of dedicated fax numbers for the Facilities:  FACILITY NAME UR FAX NUMBER   ADMISSION DENIALS (Administrative/Medical Necessity) [address] [place] [address] (Maternity/NICU/Pediatrics) Milena [phone]   Brenda [facility] [phone]   Thomas Harris [phone]   [facility] [phone]   1205 [facility] 1525 [facility] [phone]   [facility]  [phone]   2205 [facility]  [phone]   [address] [place] [address] [address] [facility] [phone]       Continued Stay Review    Date: 1/3/20                     Current Patient Class: Inpatient Current Level of Care: Med/surg    HPI:39 y o  male initially admitted on 12/30    Assessment/Plan:   Erythema receding from line marked at time of admission  Continue with Iv antibiotics  Follow up on blood and wound culture      cs    Pertinent Labs/Diagnostic Results:     Results from last 7 days   Lab Units 01/02/20  0552 01/01/20  0503 12/31/19  0512 12/30/19  1341   WBC Thousand/uL 7 38 11 36* 10 18* 11 10*   HEMOGLOBIN g/dL 13 6 14 3 13 4 14 9   HEMATOCRIT % 39 7 42 2 39 4 43 6   PLATELETS Thousands/uL 244 247 229 221   NEUTROS ABS Thousands/µL  --   --  6 38 8 08*         Results from last 7 days   Lab Units 01/02/20  0552 12/31/19  0512 12/30/19  1341   SODIUM mmol/L 139 137 137   POTASSIUM mmol/L 3 3* 3 5 3 3*   CHLORIDE mmol/L 105 103 102   CO2 mmol/L 30 26 29   ANION GAP mmol/L 4 8 6   BUN mg/dL 8 11 12   CREATININE mg/dL 0 71 0 87 1 04   EGFR ml/min/1 73sq m 117 108 89   CALCIUM mg/dL 8 2* 8 2* 8 8     Results from last 7 days   Lab Units 01/02/20  0552 12/31/19  0512 12/30/19  1341   AST U/L 36 20 27   ALT U/L 53 32 41   ALK PHOS U/L 102 92 110   TOTAL PROTEIN g/dL 7 0 6 9 8 0   ALBUMIN g/dL 2 6* 2 5* 3 1*   TOTAL BILIRUBIN mg/dL 0 78 1 01* 1 33*         Results from last 7 days   Lab Units 01/02/20  0552 12/31/19  0512 12/30/19  1341   GLUCOSE RANDOM mg/dL 87 96 92     Results from last 7 days   Lab Units 12/30/19  1341   CK TOTAL U/L 108     Results from last 7 days   Lab Units 12/30/19  1541   LACTIC ACID mmol/L 1 1       Results from last 7 days   Lab Units 12/30/19  1541   HEP B S AG  Non-reactive   HEP C AB  Non-reactive   HEP B C IGM  Non-reactive         Results from last 7 days   Lab Units 12/30/19  1543   GRAM STAIN RESULT  No Polys or Bacteria seen   WOUND CULTURE  Growth in Broth culture only Staphylococcus coagulase negative*     Vital Signs:   Date/Time  Temp Pulse Resp BP MAP (mmHg) SpO2 O2 Device   01/03/20 07:09:59  98 3 °F (36 8 °C) 80 18 137/87 104 99 % --   01/02/20 2337  -- -- -- -- -- -- None (Room air)   01/02/20 2300  -- 81 -- -- -- 98 % --   01/02/20 22:02:55  97 7 °F (36 5 °C) 79 18 131/83 99 98 % --   01/02/20 1600  -- -- -- -- -- -- None (Room air)   01/02/20 15:16:10  98 4 °F (36 9 °C) 80 18 131/86 101 99 % None (Room air)   01/02/20 0814  -- -- -- -- -- 99 % None (Room air)   01/02/20 06:59:29  98 1 °F (36 7 °C) 78 18 135/82 100 99 % --   01/01/20 23:44:05  98 1 °F (36 7 °C) 77 16 134/88 103 98 % --   01/01/20 16:11:44  98 5 °F (36 9 °C) 84 18 140/90 107 98 % None (Room air)   01/01/20 08:06:26  98 5 °F (36 9 °C) 90 18 141/89 106 96 % --   01/01/20 07:14:57  98 6 °F (37 °C) 89 17 142/91        Medications:   Scheduled Medications:    No current facility-administered medications for this encounter  Continuous IV Infusions:    No current facility-administered medications for this encounter  PRN Meds:    No current facility-administered medications for this encounter  Discharge Plan:1/3/20    Network Utilization Review Department  [email or web address]  ATTENTION: Please call with any questions or concerns to [phone] and carefully listen to the prompts so that you are directed to the right person   All voicemails are confidential   Loretta Gupta all requests for admission clinical reviews, approved or denied determinations and any other requests to dedicated fax number below belonging to the campus where the patient is receiving treatment  List of dedicated fax numbers for the Facilities:  [address] DENIALS (Administrative/Medical Necessity) [phone]   [address]  (Maternity/NICU/Pediatrics) [phone]   Luiz Cevallos [phone]   Turner Calloway [phone]   Leela Rosas [phone]   [address] [facility]  [phone]   1205 [facility] 1525 [facility] [phone]   Barbara Christianson [phone]   2207 [facility]  [phone]   412 [facility] 1000 [address] [facility] [phone]     Notification of Discharge  This is a Notification of Discharge from our facility [address]  Please be advised that this patient has been discharge from our facility  Below you will find the admission and discharge date and time including the patients disposition  PRESENTATION DATE: 12/30/2019 11:54 AM  OBS ADMISSION DATE:   IP ADMISSION DATE: 12/30/19 1432   DISCHARGE DATE: 1/3/2020  1:15 PM  DISPOSITION: Home/Self Care Home/Self Care   Admission Orders listed below:  Admission Orders (From admission, onward)     Ordered        12/30/19 1432  Inpatient Admission  Once                   Please contact the UR Department if additional information is required to close this patient's authorization/case  [address]an Utilization Review Department  Main: [phone] x carefully listen to the prompts  All voicemails are confidential   [email or web address]  Send all requests for admission clinical reviews, approved or denied determinations and any other requests to dedicated fax number below belonging to the campus where the patient is receiving treatment   List of dedicated fax numbers:  [address] DENIALS (Administrative/Medical Necessity) 2998 [facility] (Maternity/NICU/Pediatrics) [phone]   ST  605 [facility] [phone]   [facility] [phone]   Mireya Catherine [phone]   [address] [phone]   [facility]  [phone] 2205 [facility] [address]  [address] [phone]

## 2020-01-15 NOTE — UTILIZATION REVIEW
Notification of Inpatient Admission/Inpatient Authorization Request   This is a Notification of Inpatient Admission for Yaneth Gu [address]  Be advised that this patient was admitted to our facility under Inpatient Status  Contact Charu Garcia at [phone] for additional admission information  [facility] DEPT  DEDICATED [phone]  Patient Name:   Owen Wilson   YOB: 1980       [address]   [address]:   [address]  Tax ID: [id number]  NPI: [id number] Attending Provider/NPI: Maxx Smith Md [[id number]]   Attending Physician:  TAMAR Maurer  [facility] Practioner ID- [id number]  [address] [place] [address]  Phone 1: [phone]  Fax: [phone]     Place of Service Code: 24     Place of Service Name:  [address] [place] [address]   Start Date: 12/30/19 1432     Discharge Date & Time: 1/3/2020  1:15 PM    Type of Admission: Inpatient Status Discharge Disposition (if discharged): Home/Self Care   Patient Diagnoses: Cellulitis [L03 90]  Cellulitis of right lower extremity [D32 737]  Leg edema, right [R60 0]     Orders: Admission Orders (From admission, onward)     Ordered        12/30/19 1432  Inpatient Admission  Once                    Assigned Utilization Review Contact: [place]  Utilization   Network Utilization Review Department  Phone: [phone]; Fax [phone]  Email: [email or web address]   ATTENTION PAYERS: Please call the assigned Utilization  directly with any questions or concerns ALL voicemails in the department are confidential  Send all requests for admission clinical reviews, approved or denied determinations and any other requests to dedicated fax number belonging to the campus where the patient is receiving treatment      Initial Clinical Review    Admission: Date/Time/Statement: Inpatient Admission Orders (From admission, onward)     Ordered        12/30/19 1432  Inpatient Admission  Once                   Orders Placed This Encounter   Procedures    Inpatient Admission     Standing Status:   Standing     Number of Occurrences:   1     Order Specific Question:   Admitting Physician     Answer:   Kameron Jackson [[id number]]     Order Specific Question:   Level of Care     Answer:   Med Surg [16]     Order Specific Question:   Estimated length of stay     Answer:   More than 2 Midnights     Order Specific Question:   Certification     Answer:   I certify that inpatient services are medically necessary for this patient for a duration of greater than two midnights  See H&P and MD Progress Notes for additional information about the patient's course of treatment  ED Arrival Information     Expected Arrival Acuity Means of Arrival Escorted By Service Admission Type    - 12/30/2019 11:49 Urgent Walk-In Self Hospitalist Urgent    Arrival Complaint    Leg rash        Chief Complaint   Patient presents with    Cellulitis     Pt states since friday he noticed R sided calf pain/redness/swelling, pt had N/V/D and went to [facility] where the pt tested negative for flu however was dx with cellulitis in the R calf and was perscribed abx  Pt states he has been taking abx however no improvement  Assessment/Plan: 35year old male to the ED from home with complaints of right lower extremity swelling and pain for 3-4 days  Seen at urgent care 3 days prior, diagnosed with cellulitis of that extremity, prescribed KEflex which he has been taking and returns with worsening redniess and pin of entire loer leg, fever of 102 on day of arrival   Admitted to inpatient for cellulitis of right lower extremity, hypertention, hypokalemia     He has dusky red discoloration of the majority of right lower extremity between the ankle na knee with 2 blisters draining serosanguineous fluids and associated erythema consistent with cellulitis which is warm to touch  Pedal pulses are 2+  IV antibiotics initiated for cellulitis, failed ouptatient therapy  US - for DVT  Supplement potassium and recheck  WOund care consult  Wound care consult 12/31:  Right lower extremity: skin is dark red/purple and hot to touch in comparison to the LLE  There is a bullae present on the medial aspect of the leg just above the malleolus that is draining but still has large amount of fluctuance and roof is still mostly intact  A small satellite lesion that is draining to the left of the bullae is present  Drainage is small to moderate amount of serous fluid  Irrigate with normal saline, pat dry, use adaptic over blister, cover with maxorb  ED Triage Vitals [12/30/19 1159]   Temperature Pulse Respirations Blood Pressure SpO2   97 8 °F (36 6 °C) (!) 107 18 (!) 189/108 98 %      Temp Source Heart Rate Source Patient Position - Orthostatic VS BP Location FiO2 (%)   Oral Monitor Lying Right arm --      Pain Score       Worst Possible Pain          Wt Readings from Last 1 Encounters:   01/06/20 (!) 155 kg (342 lb 9 5 oz)     Additional Vital Signs:   Date/Time Temp Pulse Resp BP MAP (mmHg) SpO2 O2 Device   12/31/19 0726 97 9 °F (36 6 °C) 75 17 132/74 -- 98 % None (Room air)   12/31/19 0310 -- -- -- -- -- -- None (Room air)   12/30/19 23:56:41 98 8 °F (37 1 °C) 82 18 136/86 103 99 % --   12/30/19 1905 -- -- -- 160/72 -- -- --   12/30/19 1841 99 9 °F (37 7 °C) 101 22 154/109Abnormal  124 98 % --   12/30/19 1706 99 7 °F (37 6 °C) 98 20 151/94 -- 100 % None (Room air)   12/30/19 1430 -- 91 18 179/102Abnormal  -- 99 % None (Room air)   12/30/19 1330 -- 97 18 178/100Abnormal  -- 99 % None (Room air)   12/30/19 1230 -- 92 18 172/98Abnormal  -- 100 % None (Room air)   12/30/19 1159 97 8 °F (36 6 °C) 107Abnormal  18 189/108Abnormal         Pertinent Labs/Diagnostic Test Results:   US abdomen 12/30:  Hepatic steatosis  Normal gallbladder    XRay tib/fib 12/30: No acute osseous abnormality  VAS lower limb venous duplex 12/30:  CONCLUSION:     Impression:  RIGHT LOWER LIMB  No evidence of acute or chronic deep vein thrombosis   No evidence of superficial thrombophlebitis noted  Doppler evaluation shows a normal response to augmentation maneuvers  Popliteal, posterior tibial and anterior tibial arterial Doppler waveforms are  triphasic  Tech Note:  There is an echogenic structure located in the inguinal region  This structure  measures approximately 1 21 x 2 18 cm and is consistent with enlarged lymph node  and channels  LEFT LOWER LIMB LIMITED  Evaluation shows no evidence of thrombus in the common femoral vein  Doppler evaluation shows a normal response to augmentation maneuvers  ED Treatment:   Medication Administration from 12/30/2019 1148 to 12/30/2019 1835       Date/Time Order Dose Route Action     12/30/2019 1342 vancomycin (VANCOCIN) 1,500 mg in sodium chloride 0 9 % 250 mL IVPB 1,500 mg Intravenous New Bag     12/30/2019 1533 potassium chloride (K-DUR,KLOR-CON) CR tablet 20 mEq 20 mEq Oral Given        Past Medical History:   Diagnosis Date    Hypertension        Admitting Diagnosis: Cellulitis [L03 90]  Cellulitis of right lower extremity [L03 115]  Leg edema, right [R60 0]  Age/Sex: 35 y o  male  Admission Orders:    Scheduled Medications:    No current facility-administered medications for this encounter  Continuous IV Infusions:    No current facility-administered medications for this encounter  PRN Meds:    acetaminophen 650 mg Oral Q6H PRN   calcium carbonate 1,000 mg Oral Daily PRN   HYDROmorphone 0 5 mg Intravenous Q1H PRN   ondansetron 4 mg Intravenous Q6H PRN   oxyCODONE 10 mg Oral Q4H PRNx1   oxyCODONE 5 mg Oral Q4H PRN       IP CONSULT TO WOUND CARE    Network Utilization Review Department  [email or web address]  ATTENTION: Please call with any questions or concerns to [phone] and carefully listen to the prompts so that you are directed to the right person  All voicemails are confidential   Lior Thomas all requests for admission clinical reviews, approved or denied determinations and any other requests to dedicated fax number below belonging to the campus where the patient is receiving treatment  List of dedicated fax numbers for the Facilities:  [address] DENIALS (Administrative/Medical Necessity) [phone]   [facility] (Maternity/NICU/Pediatrics) [phone]   [facility] [phone]   [facility] [phone]   Jeannine Ab [phone]   [facility] [phone]   1205 [facility] 1525 [facility] [phone]   [facility]  [phone]   2205 [facility]  [phone]   [address] [place] [address] 1000 [facility] [phone]       Continued Stay Review    Date: 1/3/20                     Current Patient Class: Inpatient Current Level of Care: Med/surg    HPI:39 y o  male initially admitted on 12/30    Assessment/Plan:   Erythema receding from line marked at time of admission  Continue with Iv antibiotics  Follow up on blood and wound culture      cs    Pertinent Labs/Diagnostic Results:                                                   Vital Signs:   Date/Time  Temp Pulse Resp BP MAP (mmHg) SpO2 O2 Device   01/03/20 07:09:59  98 3 °F (36 8 °C) 80 18 137/87 104 99 % --   01/02/20 2337  -- -- -- -- -- -- None (Room air)   01/02/20 2300  -- 81 -- -- -- 98 % --   01/02/20 22:02:55  97 7 °F (36 5 °C) 79 18 131/83 99 98 % --   01/02/20 1600  -- -- -- -- -- -- None (Room air)   01/02/20 15:16:10  98 4 °F (36 9 °C) 80 18 131/86 101 99 % None (Room air)   01/02/20 0814  -- -- -- -- -- 99 % None (Room air)   01/02/20 06:59:29  98 1 °F (36 7 °C) 78 18 135/82 100 99 % --   01/01/20 23:44:05  98 1 °F (36 7 °C) 77 16 134/88 103 98 % --   01/01/20 16:11:44  98 5 °F (36 9 °C) 84 18 140/90 107 98 % None (Room air)   01/01/20 08:06:26  98 5 °F (36 9 °C) 90 18 141/89 106 96 % --   01/01/20 07:14:57  98 6 °F (37 °C) 89 17 142/91        Medications:   Scheduled Medications:    No current facility-administered medications for this encounter  Continuous IV Infusions:    No current facility-administered medications for this encounter  PRN Meds:    No current facility-administered medications for this encounter  Discharge Plan:1/3/20    Network Utilization Review Department  [email or web address]  ATTENTION: Please call with any questions or concerns to [phone] and carefully listen to the prompts so that you are directed to the right person  All voicemails are confidential   [place] all requests for admission clinical reviews, approved or denied determinations and any other requests to dedicated fax number below belonging to the campus where the patient is receiving treatment  List of dedicated fax numbers for the Facilities:  [address] [facility] [address] DENIALS (Administrative/Medical Necessity) [phone]   [address] (Maternity/NICU/Pediatrics) [phone]   Demario Hines [phone]   [facility] [phone]   [facility] Elena [phone]   Candida Fuel [phone]   1205 [facility] 1525 [facility] [phone]   [facility]  [phone]   2205 [facility]  [phone]   [address] [place] [address] 1000 [facility] [phone]     Notification of Discharge  This is a Notification of Discharge from our facility [address]  Please be advised that this patient has been discharge from our facility   Below you will find the admission and discharge date and time including the patients disposition  PRESENTATION DATE: 12/30/2019 11:54 AM  OBS ADMISSION DATE:   IP ADMISSION DATE: 12/30/19 1432   DISCHARGE DATE: 1/3/2020  1:15 PM  DISPOSITION: Home/Self Care Home/Self Care   Admission Orders listed below:  Admission Orders (From admission, onward)     Ordered        12/30/19 1432  Inpatient Admission  Once                   Please contact the UR Department if additional information is required to close this patient's authorization/case  Kaden Duran Utilization Review Department  Main: [phone] x carefully listen to the prompts  All voicemails are confidential   [email or web address]  Send all requests for admission clinical reviews, approved or denied determinations and any other requests to dedicated fax number below belonging to the campus where the patient is receiving treatment   List of dedicated fax numbers:  [address] (Administrative/Medical Necessity) [phone]   [address]  (Maternity/NICU/Pediatrics) [phone]   Thor Yenny [phone]   Charan Becket [phone]   Priya Carton [phone]   Doy Carbine [phone]   [address] [place] [address] [phone]   [facility]  [phone]   [address] [place] [address]  [phone]   [address] [place] [address] [phone]   [address] [place] [address] [phone]     Initial Clinical Review    Admission: Date/Time/Statement:   Inpatient Admission Orders (From admission, onward)     Ordered        12/30/19 1432  Inpatient Admission  Once                   Orders Placed This Encounter   Procedures    Inpatient Admission     Standing Status:   Standing     Number of Occurrences:   1     Order Specific Question:   Admitting Physician     Answer:   Damari Reeks [[id number]]     Order Specific Question:   Level of Care     Answer:   Med Surg [16]     Order Specific Question:   Estimated length of stay     Answer:   More than 2 Midnights     Order Specific Question:   Certification     Answer:   I certify that inpatient services are medically necessary for this patient for a duration of greater than two midnights  See H&P and MD Progress Notes for additional information about the patient's course of treatment  ED Arrival Information     Expected Arrival Acuity Means of Arrival Escorted By Service Admission Type    - 12/30/2019 11:49 Urgent Walk-In Self Hospitalist Urgent    Arrival Complaint    Leg rash        Chief Complaint   Patient presents with    Cellulitis     Pt states since friday he noticed R sided calf pain/redness/swelling, pt had N/V/D and went to [facility] where the pt tested negative for flu however was dx with cellulitis in the R calf and was perscribed abx  Pt states he has been taking abx however no improvement  Assessment/Plan: 35year old male to the ED from home with complaints of right lower extremity swelling and pain for 3-4 days  Seen at urgent care 3 days prior, diagnosed with cellulitis of that extremity, prescribed KEflex which he has been taking and returns with worsening redniess and pin of entire loer leg, fever of 102 on day of arrival   Admitted to inpatient for cellulitis of right lower extremity, hypertention, hypokalemia  He has dusky red discoloration of the majority of right lower extremity between the ankle na knee with 2 blisters draining serosanguineous fluids and associated erythema consistent with cellulitis which is warm to touch  Pedal pulses are 2+  IV antibiotics initiated for cellulitis, failed ouptatient therapy  US - for DVT  Supplement potassium and recheck  WOund care consult  Wound care consult 12/31:  Right lower extremity: skin is dark red/purple and hot to touch in comparison to the LLE   There is a bullae present on the medial aspect of the leg just above the malleolus that is draining but still has large amount of fluctuance and roof is still mostly intact  A small satellite lesion that is draining to the left of the bullae is present  Drainage is small to moderate amount of serous fluid  Irrigate with normal saline, pat dry, use adaptic over blister, cover with maxorb  ED Triage Vitals [12/30/19 1159]   Temperature Pulse Respirations Blood Pressure SpO2   97 8 °F (36 6 °C) (!) 107 18 (!) 189/108 98 %      Temp Source Heart Rate Source Patient Position - Orthostatic VS BP Location FiO2 (%)   Oral Monitor Lying Right arm --      Pain Score       Worst Possible Pain          Wt Readings from Last 1 Encounters:   01/06/20 (!) 155 kg (342 lb 9 5 oz)     Additional Vital Signs:   Date/Time Temp Pulse Resp BP MAP (mmHg) SpO2 O2 Device   12/31/19 0726 97 9 °F (36 6 °C) 75 17 132/74 -- 98 % None (Room air)   12/31/19 0310 -- -- -- -- -- -- None (Room air)   12/30/19 23:56:41 98 8 °F (37 1 °C) 82 18 136/86 103 99 % --   12/30/19 1905 -- -- -- 160/72 -- -- --   12/30/19 1841 99 9 °F (37 7 °C) 101 22 154/109Abnormal  124 98 % --   12/30/19 1706 99 7 °F (37 6 °C) 98 20 151/94 -- 100 % None (Room air)   12/30/19 1430 -- 91 18 179/102Abnormal  -- 99 % None (Room air)   12/30/19 1330 -- 97 18 178/100Abnormal  -- 99 % None (Room air)   12/30/19 1230 -- 92 18 172/98Abnormal  -- 100 % None (Room air)   12/30/19 1159 97 8 °F (36 6 °C) 107Abnormal  18 189/108Abnormal         Pertinent Labs/Diagnostic Test Results:   US abdomen 12/30:  Hepatic steatosis  Normal gallbladder  XRay tib/fib 12/30: No acute osseous abnormality  VAS lower limb venous duplex 12/30:  CONCLUSION:     Impression:  RIGHT LOWER LIMB  No evidence of acute or chronic deep vein thrombosis   No evidence of superficial thrombophlebitis noted  Doppler evaluation shows a normal response to augmentation maneuvers    Popliteal, posterior tibial and anterior tibial arterial Doppler waveforms are  triphasic  Tech Note:  There is an echogenic structure located in the inguinal region  This structure  measures approximately 1 21 x 2 18 cm and is consistent with enlarged lymph node  and channels  LEFT LOWER LIMB LIMITED  Evaluation shows no evidence of thrombus in the common femoral vein  Doppler evaluation shows a normal response to augmentation maneuvers  ED Treatment:   Medication Administration from 12/30/2019 1148 to 12/30/2019 1835       Date/Time Order Dose Route Action     12/30/2019 1342 vancomycin (VANCOCIN) 1,500 mg in sodium chloride 0 9 % 250 mL IVPB 1,500 mg Intravenous New Bag     12/30/2019 1533 potassium chloride (K-DUR,KLOR-CON) CR tablet 20 mEq 20 mEq Oral Given        Past Medical History:   Diagnosis Date    Hypertension        Admitting Diagnosis: Cellulitis [L03 90]  Cellulitis of right lower extremity [L03 115]  Leg edema, right [R60 0]  Age/Sex: 35 y o  male  Admission Orders:    Scheduled Medications:    No current facility-administered medications for this encounter  Continuous IV Infusions:    No current facility-administered medications for this encounter  PRN Meds:    acetaminophen 650 mg Oral Q6H PRN   calcium carbonate 1,000 mg Oral Daily PRN   HYDROmorphone 0 5 mg Intravenous Q1H PRN   ondansetron 4 mg Intravenous Q6H PRN   oxyCODONE 10 mg Oral Q4H PRNx1   oxyCODONE 5 mg Oral Q4H PRN       IP CONSULT TO WOUND CARE    Network Utilization Review Department  [email or web address]  ATTENTION: Please call with any questions or concerns to [phone] and carefully listen to the prompts so that you are directed to the right person  All voicemails are confidential   Sola Salcido all requests for admission clinical reviews, approved or denied determinations and any other requests to dedicated fax number below belonging to the campus where the patient is receiving treatment   List of dedicated fax numbers for the Facilities:  [address] DENIALS (Administrative/Medical Necessity) [phone]   PARENT CHILD HEALTH (Maternity/NICU/Pediatrics) [phone]   [facility] [phone]   Harpal Bearden [phone]   [facility] [phone]   Trevin Cain [phone]   1205 [facility] 1525 [facility] [phone]   Theodor [facility] [phone]   2207 [facility]  [phone]   [address] [place] [address] 1000 [facility] [phone]       Continued Stay Review    Date: 1/3/20                     Current Patient Class: Inpatient Current Level of Care: Med/surg    HPI:39 y o  male initially admitted on 12/30    Assessment/Plan:   Erythema receding from line marked at time of admission  Continue with Iv antibiotics  Follow up on blood and wound culture      cs    Pertinent Labs/Diagnostic Results:                                                   Vital Signs:   Date/Time  Temp Pulse Resp BP MAP (mmHg) SpO2 O2 Device   01/03/20 07:09:59  98 3 °F (36 8 °C) 80 18 137/87 104 99 % --   01/02/20 2337  -- -- -- -- -- -- None (Room air)   01/02/20 2300  -- 81 -- -- -- 98 % --   01/02/20 22:02:55  97 7 °F (36 5 °C) 79 18 131/83 99 98 % --   01/02/20 1600  -- -- -- -- -- -- None (Room air)   01/02/20 15:16:10  98 4 °F (36 9 °C) 80 18 131/86 101 99 % None (Room air)   01/02/20 0814  -- -- -- -- -- 99 % None (Room air)   01/02/20 06:59:29  98 1 °F (36 7 °C) 78 18 135/82 100 99 % --   01/01/20 23:44:05  98 1 °F (36 7 °C) 77 16 134/88 103 98 % --   01/01/20 16:11:44  98 5 °F (36 9 °C) 84 18 140/90 107 98 % None (Room air)   01/01/20 08:06:26  98 5 °F (36 9 °C) 90 18 141/89 106 96 % --   01/01/20 07:14:57  98 6 °F (37 °C) 89 17 142/91        Medications:   Scheduled Medications:    No current facility-administered medications for this encounter  Continuous IV Infusions:    No current facility-administered medications for this encounter  PRN Meds:    No current facility-administered medications for this encounter  Discharge Plan:1/3/20    Network Utilization Review Department  [email or web address]  ATTENTION: Please call with any questions or concerns to [phone] and carefully listen to the prompts so that you are directed to the right person  All voicemails are confidential   Mat Bicker all requests for admission clinical reviews, approved or denied determinations and any other requests to dedicated fax number below belonging to the campus where the patient is receiving treatment  List of dedicated fax numbers for the Facilities:  [address] DENIALS (Administrative/Medical Necessity) [phone]   [address] (Maternity/NICU/Pediatrics) [phone] 5400 [facility] [phone]   Annette Promise [phone]   Catha [facility] [phone]   Jailene Valenzuela [phone]   1205 [facility] 1525 [facility] [phone]   [facility]  [phone]   [address] [place] [address]  [phone]   [address] [place] [address] 1000 Health system [phone]     Notification of Discharge  This is a Notification of Discharge from our facility [address]  Please be advised that this patient has been discharge from our facility  Below you will find the admission and discharge date and time including the patients disposition      PRESENTATION DATE: 12/30/2019 11:54 AM  OBS ADMISSION DATE:   IP ADMISSION DATE: 12/30/19 1432   DISCHARGE DATE: 1/3/2020  1:15 PM  DISPOSITION: Home/Self Care Home/Self Care   Admission Orders listed below:  Admission Orders (From admission, onward)     Ordered        12/30/19 1432  Inpatient Admission  Once Please contact the UR Department if additional information is required to close this patient's authorization/case  250Savannah Duran Utilization Review Department  Main: [phone] x carefully listen to the prompts  All voicemails are confidential   [email or web address]  Send all requests for admission clinical reviews, approved or denied determinations and any other requests to dedicated fax number below belonging to the campus where the patient is receiving treatment   List of dedicated fax numbers:  [address] DENIALS (Administrative/Medical Necessity) [phone]   [address] (Maternity/NICU/Pediatrics) [phone]   Mynor Congress [phone]   [facility] [phone]   Deepa Arnold [phone]   Leatha [address] [phone]   [facility]  [phone]   2205 [facility]  2401 [facility] 1000 [facility] [phone]

## 2020-02-07 DIAGNOSIS — I10 ESSENTIAL HYPERTENSION: ICD-10-CM

## 2020-02-11 RX ORDER — HYDROCHLOROTHIAZIDE 25 MG/1
TABLET ORAL
Qty: 30 TABLET | Refills: 2 | OUTPATIENT
Start: 2020-02-11

## 2020-02-12 DIAGNOSIS — I10 ESSENTIAL HYPERTENSION: ICD-10-CM

## 2020-02-12 RX ORDER — HYDROCHLOROTHIAZIDE 25 MG/1
25 TABLET ORAL DAILY
Qty: 30 TABLET | Refills: 2 | Status: SHIPPED | OUTPATIENT
Start: 2020-02-12 | End: 2020-03-02 | Stop reason: SDUPTHER

## 2020-03-02 ENCOUNTER — OFFICE VISIT (OUTPATIENT)
Dept: FAMILY MEDICINE CLINIC | Facility: CLINIC | Age: 40
End: 2020-03-02
Payer: COMMERCIAL

## 2020-03-02 VITALS
HEART RATE: 98 BPM | OXYGEN SATURATION: 95 % | DIASTOLIC BLOOD PRESSURE: 88 MMHG | SYSTOLIC BLOOD PRESSURE: 138 MMHG | HEIGHT: 68 IN | TEMPERATURE: 99.2 F | WEIGHT: 315 LBS | BODY MASS INDEX: 47.74 KG/M2

## 2020-03-02 DIAGNOSIS — I10 ESSENTIAL HYPERTENSION: ICD-10-CM

## 2020-03-02 DIAGNOSIS — G44.89 OTHER HEADACHE SYNDROME: ICD-10-CM

## 2020-03-02 DIAGNOSIS — R07.0 THROAT PAIN: ICD-10-CM

## 2020-03-02 DIAGNOSIS — R68.89 FLU-LIKE SYMPTOMS: ICD-10-CM

## 2020-03-02 DIAGNOSIS — R52 BODY ACHES: Primary | ICD-10-CM

## 2020-03-02 DIAGNOSIS — R09.81 NASAL CONGESTION: ICD-10-CM

## 2020-03-02 PROCEDURE — 99213 OFFICE O/P EST LOW 20 MIN: CPT | Performed by: NURSE PRACTITIONER

## 2020-03-02 PROCEDURE — 3075F SYST BP GE 130 - 139MM HG: CPT | Performed by: NURSE PRACTITIONER

## 2020-03-02 PROCEDURE — 3079F DIAST BP 80-89 MM HG: CPT | Performed by: NURSE PRACTITIONER

## 2020-03-02 PROCEDURE — 1111F DSCHRG MED/CURRENT MED MERGE: CPT | Performed by: NURSE PRACTITIONER

## 2020-03-02 PROCEDURE — 3008F BODY MASS INDEX DOCD: CPT | Performed by: NURSE PRACTITIONER

## 2020-03-02 PROCEDURE — 87631 RESP VIRUS 3-5 TARGETS: CPT | Performed by: NURSE PRACTITIONER

## 2020-03-02 PROCEDURE — 1036F TOBACCO NON-USER: CPT | Performed by: NURSE PRACTITIONER

## 2020-03-02 RX ORDER — HYDROCHLOROTHIAZIDE 25 MG/1
25 TABLET ORAL DAILY
Qty: 30 TABLET | Refills: 2 | Status: SHIPPED | OUTPATIENT
Start: 2020-03-02 | End: 2020-07-01 | Stop reason: SDUPTHER

## 2020-03-02 RX ORDER — OSELTAMIVIR PHOSPHATE 75 MG/1
75 CAPSULE ORAL EVERY 12 HOURS SCHEDULED
Qty: 10 CAPSULE | Refills: 0 | Status: SHIPPED | OUTPATIENT
Start: 2020-03-02 | End: 2020-03-07

## 2020-03-02 NOTE — LETTER
March 2, 2020     Patient: Mazin Chavez   YOB: 1980   Date of Visit: 3/2/2020       To Whom it May Concern:    Mazin Chavez is under my professional care  He was seen in my office on 3/2/2020  Please excuse him from work on 03/02/2020, and 03/03/2020  If you have any questions or concerns, please don't hesitate to call           Sincerely,          KELLI Valdez        CC: No Recipients

## 2020-03-02 NOTE — PROGRESS NOTES
Assessment/Plan:       Diagnoses and all orders for this visit:    Body aches  -     Influenza A/B and RSV PCR; Future  -     oseltamivir (TAMIFLU) 75 mg capsule; Take 1 capsule (75 mg total) by mouth every 12 (twelve) hours for 5 days  -     Influenza A/B and RSV PCR    Nasal congestion  -     Influenza A/B and RSV PCR; Future  -     oseltamivir (TAMIFLU) 75 mg capsule; Take 1 capsule (75 mg total) by mouth every 12 (twelve) hours for 5 days  -     Influenza A/B and RSV PCR    Other headache syndrome  -     Influenza A/B and RSV PCR; Future  -     oseltamivir (TAMIFLU) 75 mg capsule; Take 1 capsule (75 mg total) by mouth every 12 (twelve) hours for 5 days  -     Influenza A/B and RSV PCR    Throat pain  -     Influenza A/B and RSV PCR; Future  -     oseltamivir (TAMIFLU) 75 mg capsule; Take 1 capsule (75 mg total) by mouth every 12 (twelve) hours for 5 days  -     Influenza A/B and RSV PCR    Flu-like symptoms  -     Influenza A/B and RSV PCR; Future  -     oseltamivir (TAMIFLU) 75 mg capsule; Take 1 capsule (75 mg total) by mouth every 12 (twelve) hours for 5 days  -     Influenza A/B and RSV PCR    Essential hypertension  -     hydrochlorothiazide (HYDRODIURIL) 25 mg tablet; Take 1 tablet (25 mg total) by mouth daily      Will swab for influenza based on rapid onset of symptoms, if negative will treat for sinus infection  Tamiflu prescribed in the interim  Subjective:      Patient ID: Eloisa Zepeda is a 38 y o  male  Here today for an acute visit - reports onset of throat pain starting Sunday with rapid onset Sunday of fever, cough, body aches, chills, nasal congestion, continued throat pain, headache  Notes fever at home was 101 8F  - has been taking Motrin  Last dose of Motrin was at 0600 this AM   Denies any knowledge of sick contacts         The following portions of the patient's history were reviewed and updated as appropriate: allergies, current medications, past family history, past medical history, past social history, past surgical history and problem list     Review of Systems   Constitutional: Positive for chills, fatigue and fever  HENT: Positive for congestion, sinus pressure, sinus pain and sore throat  Respiratory: Positive for choking  Cardiovascular: Negative  Musculoskeletal: Positive for myalgias  Objective:      /88 (BP Location: Left arm, Patient Position: Sitting, Cuff Size: Large)   Pulse 98   Temp 99 2 °F (37 3 °C)   Ht 5' 8" (1 727 m)   Wt (!) 154 kg (339 lb 1 1 oz)   SpO2 95%   BMI 51 56 kg/m²          Physical Exam   Constitutional: He is oriented to person, place, and time  He appears well-developed  He appears ill  HENT:   Head: Normocephalic and atraumatic  Right Ear: Hearing, tympanic membrane and ear canal normal    Left Ear: Hearing, tympanic membrane and ear canal normal    Mouth/Throat: Uvula is midline  Posterior oropharyngeal erythema present  Tonsils are 1+ on the right  Tonsils are 1+ on the left  No tonsillar exudate  Eyes: Pupils are equal, round, and reactive to light  Neck: Normal range of motion  Neck supple  Cardiovascular: Normal rate, regular rhythm and normal heart sounds  No murmur heard  Pulmonary/Chest: Effort normal and breath sounds normal  No respiratory distress  He has no wheezes  He has no rales  Lymphadenopathy:     He has no cervical adenopathy  Neurological: He is alert and oriented to person, place, and time  Skin: Skin is warm and dry  Psychiatric: He has a normal mood and affect   His behavior is normal

## 2020-03-02 NOTE — LETTER
March 3, 2020     Patient: Jasper Pope   YOB: 1980   Date of Visit: 3/2/2020       To Whom it May Concern:    Jasper Pope is under my professional care  He was seen in my office on 3/2/2020  Please excuse him from work on 03/04/2020, 03/05/2020, and 03/06/2020 due to influenza  He may return to work on 03/09/2020  If you have any questions or concerns, please don't hesitate to call           Sincerely,          Robert Sewell

## 2020-03-03 LAB
FLUAV RNA NPH QL NAA+PROBE: DETECTED
FLUBV RNA NPH QL NAA+PROBE: ABNORMAL
RSV RNA NPH QL NAA+PROBE: ABNORMAL

## 2020-07-01 DIAGNOSIS — I10 ESSENTIAL HYPERTENSION: ICD-10-CM

## 2020-07-01 RX ORDER — HYDROCHLOROTHIAZIDE 25 MG/1
25 TABLET ORAL DAILY
Qty: 30 TABLET | Refills: 0 | Status: SHIPPED | OUTPATIENT
Start: 2020-07-01 | End: 2020-08-12 | Stop reason: SDUPTHER

## 2020-08-07 DIAGNOSIS — I10 ESSENTIAL HYPERTENSION: ICD-10-CM

## 2020-08-07 RX ORDER — HYDROCHLOROTHIAZIDE 25 MG/1
25 TABLET ORAL DAILY
Qty: 30 TABLET | Refills: 0 | OUTPATIENT
Start: 2020-08-07

## 2020-08-07 NOTE — TELEPHONE ENCOUNTER
Please refuse, i called patient unable to get a hold of him   I spoke with pharmacy to make them aware we haven't seen patient since January and we needed to see him to fill this medication

## 2020-08-12 ENCOUNTER — OFFICE VISIT (OUTPATIENT)
Dept: FAMILY MEDICINE CLINIC | Facility: CLINIC | Age: 40
End: 2020-08-12
Payer: COMMERCIAL

## 2020-08-12 ENCOUNTER — APPOINTMENT (OUTPATIENT)
Dept: LAB | Facility: HOSPITAL | Age: 40
End: 2020-08-12
Payer: COMMERCIAL

## 2020-08-12 VITALS
DIASTOLIC BLOOD PRESSURE: 90 MMHG | OXYGEN SATURATION: 97 % | SYSTOLIC BLOOD PRESSURE: 142 MMHG | TEMPERATURE: 98.9 F | HEIGHT: 68 IN | HEART RATE: 97 BPM | WEIGHT: 315 LBS | BODY MASS INDEX: 47.74 KG/M2

## 2020-08-12 DIAGNOSIS — K21.9 GASTROESOPHAGEAL REFLUX DISEASE WITHOUT ESOPHAGITIS: ICD-10-CM

## 2020-08-12 DIAGNOSIS — Z00.00 ANNUAL PHYSICAL EXAM: ICD-10-CM

## 2020-08-12 DIAGNOSIS — R10.9 LEFT SIDED ABDOMINAL PAIN: ICD-10-CM

## 2020-08-12 DIAGNOSIS — K57.92 DIVERTICULITIS: ICD-10-CM

## 2020-08-12 DIAGNOSIS — Z00.00 ANNUAL PHYSICAL EXAM: Primary | ICD-10-CM

## 2020-08-12 DIAGNOSIS — I10 ESSENTIAL HYPERTENSION: ICD-10-CM

## 2020-08-12 LAB
ALBUMIN SERPL BCP-MCNC: 3.5 G/DL (ref 3.5–5)
ALP SERPL-CCNC: 111 U/L (ref 46–116)
ALT SERPL W P-5'-P-CCNC: 50 U/L (ref 12–78)
ANION GAP SERPL CALCULATED.3IONS-SCNC: 7 MMOL/L (ref 4–13)
AST SERPL W P-5'-P-CCNC: 26 U/L (ref 5–45)
BILIRUB SERPL-MCNC: 1.07 MG/DL (ref 0.2–1)
BUN SERPL-MCNC: 10 MG/DL (ref 5–25)
CALCIUM SERPL-MCNC: 9.3 MG/DL (ref 8.3–10.1)
CHLORIDE SERPL-SCNC: 106 MMOL/L (ref 100–108)
CHOLEST SERPL-MCNC: 149 MG/DL (ref 50–200)
CO2 SERPL-SCNC: 28 MMOL/L (ref 21–32)
CREAT SERPL-MCNC: 0.87 MG/DL (ref 0.6–1.3)
GFR SERPL CREATININE-BSD FRML MDRD: 107 ML/MIN/1.73SQ M
GLUCOSE P FAST SERPL-MCNC: 99 MG/DL (ref 65–99)
HDLC SERPL-MCNC: 39 MG/DL
LDLC SERPL CALC-MCNC: 92 MG/DL (ref 0–100)
LIPASE SERPL-CCNC: 394 U/L (ref 73–393)
NONHDLC SERPL-MCNC: 110 MG/DL
POTASSIUM SERPL-SCNC: 3.6 MMOL/L (ref 3.5–5.3)
PROT SERPL-MCNC: 7.4 G/DL (ref 6.4–8.2)
SODIUM SERPL-SCNC: 141 MMOL/L (ref 136–145)
TRIGL SERPL-MCNC: 90 MG/DL

## 2020-08-12 PROCEDURE — 3080F DIAST BP >= 90 MM HG: CPT | Performed by: NURSE PRACTITIONER

## 2020-08-12 PROCEDURE — 80061 LIPID PANEL: CPT

## 2020-08-12 PROCEDURE — 3077F SYST BP >= 140 MM HG: CPT | Performed by: NURSE PRACTITIONER

## 2020-08-12 PROCEDURE — 3725F SCREEN DEPRESSION PERFORMED: CPT | Performed by: NURSE PRACTITIONER

## 2020-08-12 PROCEDURE — 36415 COLL VENOUS BLD VENIPUNCTURE: CPT

## 2020-08-12 PROCEDURE — 83036 HEMOGLOBIN GLYCOSYLATED A1C: CPT

## 2020-08-12 PROCEDURE — 3008F BODY MASS INDEX DOCD: CPT | Performed by: NURSE PRACTITIONER

## 2020-08-12 PROCEDURE — 83690 ASSAY OF LIPASE: CPT

## 2020-08-12 PROCEDURE — 1036F TOBACCO NON-USER: CPT | Performed by: NURSE PRACTITIONER

## 2020-08-12 PROCEDURE — 99396 PREV VISIT EST AGE 40-64: CPT | Performed by: NURSE PRACTITIONER

## 2020-08-12 PROCEDURE — 80053 COMPREHEN METABOLIC PANEL: CPT

## 2020-08-12 RX ORDER — CEPHALEXIN 500 MG/1
500 CAPSULE ORAL EVERY 12 HOURS SCHEDULED
Qty: 20 CAPSULE | Refills: 0 | Status: SHIPPED | OUTPATIENT
Start: 2020-08-12 | End: 2020-08-22

## 2020-08-12 RX ORDER — METRONIDAZOLE 500 MG/1
500 TABLET ORAL EVERY 12 HOURS SCHEDULED
Qty: 20 TABLET | Refills: 0 | Status: SHIPPED | OUTPATIENT
Start: 2020-08-12 | End: 2020-08-22

## 2020-08-12 RX ORDER — HYDROCHLOROTHIAZIDE 25 MG/1
25 TABLET ORAL DAILY
Qty: 90 TABLET | Refills: 1 | Status: SHIPPED | OUTPATIENT
Start: 2020-08-12 | End: 2020-11-09 | Stop reason: SDUPTHER

## 2020-08-12 RX ORDER — OMEPRAZOLE 20 MG/1
20 CAPSULE, DELAYED RELEASE ORAL
Qty: 30 CAPSULE | Refills: 0 | Status: SHIPPED | OUTPATIENT
Start: 2020-08-12 | End: 2020-11-09

## 2020-08-12 NOTE — PATIENT INSTRUCTIONS

## 2020-08-12 NOTE — PROGRESS NOTES
[facility]    NAME: Jerry Ramsay  AGE: 38 y o  SEX: male  : 1980     DATE: 2020     Assessment and Plan:     Problem List Items Addressed This Visit        Cardiovascular and Mediastinum    Essential hypertension    Relevant Medications    hydrochlorothiazide (HYDRODIURIL) 25 mg tablet      Other Visit Diagnoses     Annual physical exam    -  Primary    Relevant Orders    Lipid panel (Completed)    Comprehensive metabolic panel (Completed)    Lipase (Completed)    HEMOGLOBIN A1C W/ EAG ESTIMATION    Left sided abdominal pain        Relevant Medications    metroNIDAZOLE (FLAGYL) 500 mg tablet    cephalexin (KEFLEX) 500 mg capsule    omeprazole (PriLOSEC) 20 mg delayed release capsule    Gastroesophageal reflux disease without esophagitis        Relevant Medications    omeprazole (PriLOSEC) 20 mg delayed release capsule    Diverticulitis        Relevant Medications    metroNIDAZOLE (FLAGYL) 500 mg tablet    cephalexin (KEFLEX) 500 mg capsule    BMI 50 0-59 9, adult (HCC)              Immunizations and preventive care screenings were discussed with patient today  Appropriate education was printed on patient's after visit summary  Will also evaluate blood work today  Refill for HTN medication sent to pharmacy  Counseling:  · Alcohol/drug use: discussed moderation in alcohol intake, the recommendations for healthy alcohol use, and avoidance of illicit drug use  BMI Counseling: Body mass index is 54 86 kg/m²  The BMI is above normal  Nutrition recommendations include decreasing portion sizes and encouraging healthy choices of fruits and vegetables  Will treat his abdominal pain as diverticulosis as it is located in the left lower quadrant of the abdomen and he has a past hx of it  Will add Omeprazole to regimen due to side effects of dual abx therapy  To call in two days if no improvement    Also with a hx of HTN - is in need of refills today  Return in 3 months (on 11/12/2020)  Chief Complaint:     Chief Complaint   Patient presents with    Physical Exam     preventative visit    Abdominal Pain     left side abdominal pain x 3 days  has hx of diverticullitis  History of Present Illness:     Adult Annual Physical   Patient here for a comprehensive physical exam  The patient reports hx of left sided abdominal pain in the lower portion of the abdomen  Onset several days ago  Describes pain as sharp in nature  Reports several years ago he was with similar pain with also nausea, vomiting, and diarrhea  Went to the ED at that time and was diagnosed with diverticulitis  Diet and Physical Activity  · Diet/Nutrition: well balanced diet  · Exercise: no formal exercise  Depression Screening  PHQ-9 Depression Screening    PHQ-9:    Frequency of the following problems over the past two weeks:       Little interest or pleasure in doing things:  0 - not at all  Feeling down, depressed, or hopeless:  0 - not at all  PHQ-2 Score:  0       General Health  · Sleep: sleeps well  · Hearing: normal - bilateral   · Vision: no vision problems  · Dental: regular dental visits and brushes teeth twice daily   Health  · Symptoms include: none     Review of Systems:     Review of Systems   Constitutional: Negative  Respiratory: Negative  Cardiovascular: Negative  Gastrointestinal:        Please see HPI  Neurological: Negative  Negative for dizziness and headaches  All other systems reviewed and are negative  Past Medical History:     Past Medical History:   Diagnosis Date    Hypertension       Past Surgical History:     History reviewed  No pertinent surgical history     Family History:     Family History   Problem Relation Age of Onset    Diabetes Mother     Hypertension Father     Kidney disease Father       Social History:        Social History     Socioeconomic History    Marital status: Single     Spouse name: None    Number of children: None    Years of education: None    Highest education level: None   Occupational History    None   Social Needs    Financial resource strain: None    Food insecurity     Worry: None     Inability: None    Transportation needs     Medical: None     Non-medical: None   Tobacco Use    Smoking status: Never Smoker    Smokeless tobacco: Never Used   Substance and Sexual Activity    Alcohol use: Not Currently    Drug use: Not Currently    Sexual activity: Yes     Partners: Female   Lifestyle    Physical activity     Days per week: None     Minutes per session: None    Stress: None   Relationships    Social connections     Talks on phone: None     Gets together: None     Attends Holiness service: None     Active member of club or organization: None     Attends meetings of clubs or organizations: None     Relationship status: None    Intimate partner violence     Fear of current or ex partner: None     Emotionally abused: None     Physically abused: None     Forced sexual activity: None   Other Topics Concern    None   Social History Narrative    None      Current Medications:     Current Outpatient Medications   Medication Sig Dispense Refill    hydrochlorothiazide (HYDRODIURIL) 25 mg tablet Take 1 tablet (25 mg total) by mouth daily 90 tablet 1    cephalexin (KEFLEX) 500 mg capsule Take 1 capsule (500 mg total) by mouth every 12 (twelve) hours for 10 days 20 capsule 0    metroNIDAZOLE (FLAGYL) 500 mg tablet Take 1 tablet (500 mg total) by mouth every 12 (twelve) hours for 10 days 20 tablet 0    omeprazole (PriLOSEC) 20 mg delayed release capsule Take 1 capsule (20 mg total) by mouth daily before breakfast 30 capsule 0     No current facility-administered medications for this visit         Allergies:     No Known Allergies   Physical Exam:     /90 (BP Location: Right arm, Patient Position: Sitting, Cuff Size: Large)   Pulse 97   Temp 98 9 °F (37 2 °C)   Ht 5' 8" (1 727 m)   Wt (!) 164 kg (360 lb 12 8 oz)   SpO2 97%   BMI 54 86 kg/m²     Physical Exam  Vitals signs reviewed  Constitutional:       Appearance: He is well-developed  HENT:      Head: Normocephalic and atraumatic  Cardiovascular:      Rate and Rhythm: Normal rate  Heart sounds: No murmur  Pulmonary:      Effort: Pulmonary effort is normal  No respiratory distress  Breath sounds: Normal breath sounds  Abdominal:      General: Abdomen is protuberant  There is no distension  Tenderness: There is no guarding  Neurological:      General: No focal deficit present  Mental Status: He is alert and oriented to person, place, and time  Mental status is at baseline            Judy Gr[facility] [address] [facility]  PRIMARY CARE

## 2020-08-13 DIAGNOSIS — R17 TOTAL BILIRUBIN, ELEVATED: Primary | ICD-10-CM

## 2020-08-13 LAB
EST. AVERAGE GLUCOSE BLD GHB EST-MCNC: 105 MG/DL
HBA1C MFR BLD: 5.3 %

## 2020-11-06 ENCOUNTER — TELEMEDICINE (OUTPATIENT)
Dept: FAMILY MEDICINE CLINIC | Facility: CLINIC | Age: 40
End: 2020-11-06
Payer: COMMERCIAL

## 2020-11-06 VITALS — BODY MASS INDEX: 39.4 KG/M2 | HEIGHT: 68 IN | WEIGHT: 260 LBS | TEMPERATURE: 101.5 F

## 2020-11-06 DIAGNOSIS — R50.9 FEVER, UNSPECIFIED FEVER CAUSE: Primary | ICD-10-CM

## 2020-11-06 DIAGNOSIS — Z20.828 EXPOSURE TO SARS-ASSOCIATED CORONAVIRUS: ICD-10-CM

## 2020-11-06 DIAGNOSIS — R68.83 CHILLS: ICD-10-CM

## 2020-11-06 DIAGNOSIS — R05.9 COUGH: ICD-10-CM

## 2020-11-06 DIAGNOSIS — R51.9 NONINTRACTABLE HEADACHE, UNSPECIFIED CHRONICITY PATTERN, UNSPECIFIED HEADACHE TYPE: ICD-10-CM

## 2020-11-06 PROCEDURE — U0003 INFECTIOUS AGENT DETECTION BY NUCLEIC ACID (DNA OR RNA); SEVERE ACUTE RESPIRATORY SYNDROME CORONAVIRUS 2 (SARS-COV-2) (CORONAVIRUS DISEASE [COVID-19]), AMPLIFIED PROBE TECHNIQUE, MAKING USE OF HIGH THROUGHPUT TECHNOLOGIES AS DESCRIBED BY CMS-2020-01-R: HCPCS | Performed by: NURSE PRACTITIONER

## 2020-11-06 PROCEDURE — 99214 OFFICE O/P EST MOD 30 MIN: CPT | Performed by: NURSE PRACTITIONER

## 2020-11-07 LAB — SARS-COV-2 RNA SPEC QL NAA+PROBE: NOT DETECTED

## 2020-11-09 ENCOUNTER — OFFICE VISIT (OUTPATIENT)
Dept: FAMILY MEDICINE CLINIC | Facility: CLINIC | Age: 40
End: 2020-11-09
Payer: COMMERCIAL

## 2020-11-09 VITALS
WEIGHT: 315 LBS | BODY MASS INDEX: 47.74 KG/M2 | HEIGHT: 68 IN | TEMPERATURE: 97.3 F | HEART RATE: 86 BPM | OXYGEN SATURATION: 98 % | DIASTOLIC BLOOD PRESSURE: 80 MMHG | SYSTOLIC BLOOD PRESSURE: 135 MMHG

## 2020-11-09 DIAGNOSIS — Z23 NEEDS FLU SHOT: Primary | ICD-10-CM

## 2020-11-09 DIAGNOSIS — M79.661 PAIN IN RIGHT LOWER LEG: ICD-10-CM

## 2020-11-09 DIAGNOSIS — L03.115 CELLULITIS OF RIGHT LOWER EXTREMITY: ICD-10-CM

## 2020-11-09 DIAGNOSIS — I10 ESSENTIAL HYPERTENSION: ICD-10-CM

## 2020-11-09 PROCEDURE — 3075F SYST BP GE 130 - 139MM HG: CPT | Performed by: NURSE PRACTITIONER

## 2020-11-09 PROCEDURE — 90471 IMMUNIZATION ADMIN: CPT | Performed by: NURSE PRACTITIONER

## 2020-11-09 PROCEDURE — 99214 OFFICE O/P EST MOD 30 MIN: CPT | Performed by: NURSE PRACTITIONER

## 2020-11-09 PROCEDURE — 1036F TOBACCO NON-USER: CPT | Performed by: NURSE PRACTITIONER

## 2020-11-09 PROCEDURE — 90686 IIV4 VACC NO PRSV 0.5 ML IM: CPT | Performed by: NURSE PRACTITIONER

## 2020-11-09 PROCEDURE — 3079F DIAST BP 80-89 MM HG: CPT | Performed by: NURSE PRACTITIONER

## 2020-11-09 PROCEDURE — 3008F BODY MASS INDEX DOCD: CPT | Performed by: NURSE PRACTITIONER

## 2020-11-09 RX ORDER — OXYCODONE HYDROCHLORIDE 5 MG/1
5 TABLET ORAL EVERY 4 HOURS PRN
Qty: 25 TABLET | Refills: 0 | Status: SHIPPED | OUTPATIENT
Start: 2020-11-09 | End: 2020-12-03 | Stop reason: SDUPTHER

## 2020-11-09 RX ORDER — HYDROCHLOROTHIAZIDE 25 MG/1
25 TABLET ORAL DAILY
Qty: 90 TABLET | Refills: 1 | Status: SHIPPED | OUTPATIENT
Start: 2020-11-09 | End: 2021-11-03 | Stop reason: SDUPTHER

## 2020-11-09 RX ORDER — CEPHALEXIN 500 MG/1
500 CAPSULE ORAL EVERY 8 HOURS SCHEDULED
Qty: 30 CAPSULE | Refills: 0 | Status: SHIPPED | OUTPATIENT
Start: 2020-11-09 | End: 2020-11-19

## 2020-12-03 DIAGNOSIS — M79.661 PAIN IN RIGHT LOWER LEG: ICD-10-CM

## 2020-12-03 DIAGNOSIS — L03.115 CELLULITIS OF RIGHT LOWER EXTREMITY: ICD-10-CM

## 2020-12-03 RX ORDER — OXYCODONE HYDROCHLORIDE 5 MG/1
5 TABLET ORAL EVERY 4 HOURS PRN
Qty: 25 TABLET | Refills: 0 | Status: SHIPPED | OUTPATIENT
Start: 2020-12-03 | End: 2021-06-02

## 2020-12-14 ENCOUNTER — APPOINTMENT (OUTPATIENT)
Dept: URGENT CARE | Facility: CLINIC | Age: 40
End: 2020-12-14
Payer: COMMERCIAL

## 2020-12-14 DIAGNOSIS — Z20.828 EXPOSURE TO VIRAL DISEASE: Primary | ICD-10-CM

## 2020-12-14 PROCEDURE — U0003 INFECTIOUS AGENT DETECTION BY NUCLEIC ACID (DNA OR RNA); SEVERE ACUTE RESPIRATORY SYNDROME CORONAVIRUS 2 (SARS-COV-2) (CORONAVIRUS DISEASE [COVID-19]), AMPLIFIED PROBE TECHNIQUE, MAKING USE OF HIGH THROUGHPUT TECHNOLOGIES AS DESCRIBED BY CMS-2020-01-R: HCPCS

## 2020-12-15 LAB — SARS-COV-2 RNA SPEC QL NAA+PROBE: NOT DETECTED

## 2020-12-28 ENCOUNTER — TELEMEDICINE (OUTPATIENT)
Dept: FAMILY MEDICINE CLINIC | Facility: CLINIC | Age: 40
End: 2020-12-28
Payer: COMMERCIAL

## 2020-12-28 VITALS — TEMPERATURE: 99 F

## 2020-12-28 DIAGNOSIS — R05.9 COUGH: Primary | ICD-10-CM

## 2020-12-28 DIAGNOSIS — R50.9 FEVER, UNSPECIFIED FEVER CAUSE: ICD-10-CM

## 2020-12-28 DIAGNOSIS — R09.81 NASAL CONGESTION: ICD-10-CM

## 2020-12-28 DIAGNOSIS — R52 BODY ACHES: ICD-10-CM

## 2020-12-28 DIAGNOSIS — R68.83 CHILLS: ICD-10-CM

## 2020-12-28 DIAGNOSIS — G44.89 OTHER HEADACHE SYNDROME: ICD-10-CM

## 2020-12-28 PROCEDURE — 99214 OFFICE O/P EST MOD 30 MIN: CPT | Performed by: NURSE PRACTITIONER

## 2020-12-28 PROCEDURE — 1036F TOBACCO NON-USER: CPT | Performed by: NURSE PRACTITIONER

## 2020-12-31 ENCOUNTER — TELEPHONE (OUTPATIENT)
Dept: FAMILY MEDICINE CLINIC | Facility: CLINIC | Age: 40
End: 2020-12-31

## 2020-12-31 DIAGNOSIS — R11.0 NAUSEA: Primary | ICD-10-CM

## 2020-12-31 RX ORDER — ONDANSETRON 4 MG/1
4 TABLET, ORALLY DISINTEGRATING ORAL EVERY 6 HOURS PRN
Qty: 20 TABLET | Refills: 0 | Status: SHIPPED | OUTPATIENT
Start: 2020-12-31 | End: 2021-06-02

## 2020-12-31 NOTE — TELEPHONE ENCOUNTER
Patient called and stated that he tested positive for COVID  He is nauseous and wanted to know if you could prescribe something  If possible patient would like something sent to [facility] in [place]  Patient also states the he is not able to stay asleep at night

## 2020-12-31 NOTE — TELEPHONE ENCOUNTER
Unfortunately the insomnia is one of the parts of COVID that has been happening more often lately  It usually passes in a few days  I can send Zofran for his nausea to the pharmacy

## 2021-06-02 ENCOUNTER — OFFICE VISIT (OUTPATIENT)
Dept: FAMILY MEDICINE CLINIC | Facility: CLINIC | Age: 41
End: 2021-06-02
Payer: COMMERCIAL

## 2021-06-02 VITALS
DIASTOLIC BLOOD PRESSURE: 102 MMHG | HEART RATE: 90 BPM | OXYGEN SATURATION: 96 % | TEMPERATURE: 97.8 F | WEIGHT: 315 LBS | SYSTOLIC BLOOD PRESSURE: 150 MMHG | HEIGHT: 68 IN | BODY MASS INDEX: 47.74 KG/M2

## 2021-06-02 DIAGNOSIS — I10 ESSENTIAL HYPERTENSION: Primary | ICD-10-CM

## 2021-06-02 PROCEDURE — 3725F SCREEN DEPRESSION PERFORMED: CPT | Performed by: NURSE PRACTITIONER

## 2021-06-02 PROCEDURE — 99213 OFFICE O/P EST LOW 20 MIN: CPT | Performed by: NURSE PRACTITIONER

## 2021-06-02 RX ORDER — LISINOPRIL 10 MG/1
10 TABLET ORAL DAILY
Qty: 90 TABLET | Refills: 0 | Status: SHIPPED | OUTPATIENT
Start: 2021-06-02 | End: 2021-12-23 | Stop reason: SDUPTHER

## 2021-06-02 NOTE — LETTER
June 2, 2021     Patient: Shayla Desai   YOB: 1980   Date of Visit: 6/2/2021       To Whom it May Concern:    Shayla Desai is under my professional care  He was seen in my office on 6/2/2021  He is with a history of hypertension for which his blood pressure has recently been above the recommended guidelines of 140/90  At this time, I have add an additional medication to help control his blood pressure  He is returning to our office Friday for a blood pressure check to evaluate if his measurement is at an acceptable level  If you have any questions or concerns, please don't hesitate to call           Sincerely,          Reji Lora

## 2021-06-02 NOTE — PROGRESS NOTES
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  His BP at his exam was 150/102, and today it was also elevated at 160/100  He is currently on HCTZ for HTN management  The following portions of the patient's history were reviewed and updated as appropriate: allergies, current medications, past family history, past medical history, past social history, past surgical history and problem list     Review of Systems   Constitutional: Negative  Respiratory: Negative  Cardiovascular: Negative  Neurological: Negative  All other systems reviewed and are negative  Objective:      BP (!) 150/102 (BP Location: Right arm, Patient Position: Sitting, Cuff Size: Large)   Pulse 90   Temp 97 8 °F (36 6 °C)   Ht 5' 8" (1 727 m)   Wt (!) 157 kg (345 lb 6 4 oz)   SpO2 96%   BMI 52 52 kg/m²          Physical Exam  Constitutional:       Appearance: Normal appearance  Cardiovascular:      Rate and Rhythm: Normal rate and regular rhythm  Heart sounds: No murmur  No gallop  Pulmonary:      Effort: Pulmonary effort is normal  No respiratory distress  Breath sounds: Normal breath sounds  No wheezing or rales  Neurological:      General: No focal deficit present  Mental Status: He is alert and oriented to person, place, and time  Mental status is at baseline

## 2021-06-04 ENCOUNTER — OFFICE VISIT (OUTPATIENT)
Dept: FAMILY MEDICINE CLINIC | Facility: CLINIC | Age: 41
End: 2021-06-04
Payer: COMMERCIAL

## 2021-06-04 VITALS
TEMPERATURE: 98 F | WEIGHT: 315 LBS | HEART RATE: 71 BPM | SYSTOLIC BLOOD PRESSURE: 146 MMHG | OXYGEN SATURATION: 98 % | DIASTOLIC BLOOD PRESSURE: 98 MMHG | BODY MASS INDEX: 47.74 KG/M2 | HEIGHT: 68 IN

## 2021-06-04 DIAGNOSIS — Z01.30 BLOOD PRESSURE CHECK: Primary | ICD-10-CM

## 2021-06-04 DIAGNOSIS — I10 ESSENTIAL HYPERTENSION: ICD-10-CM

## 2021-06-04 PROCEDURE — 99213 OFFICE O/P EST LOW 20 MIN: CPT | Performed by: NURSE PRACTITIONER

## 2021-06-04 PROCEDURE — 3008F BODY MASS INDEX DOCD: CPT | Performed by: NURSE PRACTITIONER

## 2021-06-04 PROCEDURE — 3080F DIAST BP >= 90 MM HG: CPT | Performed by: NURSE PRACTITIONER

## 2021-06-04 PROCEDURE — 1036F TOBACCO NON-USER: CPT | Performed by: NURSE PRACTITIONER

## 2021-06-04 PROCEDURE — 3077F SYST BP >= 140 MM HG: CPT | Performed by: NURSE PRACTITIONER

## 2021-06-04 NOTE — PROGRESS NOTES
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 training next week  BMI Counseling: Body mass index is 51 7 kg/m²  The BMI is above normal  Nutrition recommendations include encouraging healthy choices of fruits and vegetables, moderation in carbohydrate intake and reducing intake of saturated and trans fat  Exercise recommendations include moderate physical activity 150 minutes/week  Subjective:      Patient ID: Juliano Andrews is a 42 y o  male  Here today for BP check was recently started no a second BP medication due to elevated BP at a work physical   He has been taking HCTZ for over a year  He has recently lost 20 pounds  The following portions of the patient's history were reviewed and updated as appropriate: allergies, current medications, past family history, past medical history, past social history, past surgical history and problem list     Review of Systems   Constitutional: Negative  Respiratory: Negative  Cardiovascular: Negative  Neurological: Negative  Objective:      /98 (BP Location: Left arm, Patient Position: Sitting, Cuff Size: Large)   Pulse 71   Temp 98 °F (36 7 °C)   Ht 5' 8" (1 727 m)   Wt (!) 154 kg (340 lb)   SpO2 98%   BMI 51 70 kg/m²          Physical Exam  Constitutional:       Appearance: Normal appearance  Pulmonary:      Effort: Pulmonary effort is normal    Neurological:      Mental Status: He is alert and oriented to person, place, and time

## 2021-11-03 DIAGNOSIS — I10 ESSENTIAL HYPERTENSION: ICD-10-CM

## 2021-11-03 RX ORDER — HYDROCHLOROTHIAZIDE 25 MG/1
25 TABLET ORAL DAILY
Qty: 90 TABLET | Refills: 0 | Status: SHIPPED | OUTPATIENT
Start: 2021-11-03 | End: 2021-12-23 | Stop reason: SDUPTHER

## 2021-12-23 ENCOUNTER — TELEMEDICINE (OUTPATIENT)
Dept: FAMILY MEDICINE CLINIC | Facility: CLINIC | Age: 41
End: 2021-12-23
Payer: COMMERCIAL

## 2021-12-23 VITALS — BODY MASS INDEX: 47.74 KG/M2 | HEIGHT: 68 IN | WEIGHT: 315 LBS

## 2021-12-23 DIAGNOSIS — R50.9 FEVER, UNSPECIFIED FEVER CAUSE: ICD-10-CM

## 2021-12-23 DIAGNOSIS — M79.604 RIGHT LEG PAIN: ICD-10-CM

## 2021-12-23 DIAGNOSIS — I10 ESSENTIAL HYPERTENSION: ICD-10-CM

## 2021-12-23 DIAGNOSIS — L03.115 CELLULITIS OF RIGHT LOWER EXTREMITY: Primary | ICD-10-CM

## 2021-12-23 PROCEDURE — 1036F TOBACCO NON-USER: CPT | Performed by: NURSE PRACTITIONER

## 2021-12-23 PROCEDURE — 99213 OFFICE O/P EST LOW 20 MIN: CPT | Performed by: NURSE PRACTITIONER

## 2021-12-23 PROCEDURE — 3008F BODY MASS INDEX DOCD: CPT | Performed by: NURSE PRACTITIONER

## 2021-12-23 RX ORDER — LISINOPRIL 10 MG/1
10 TABLET ORAL DAILY
Qty: 90 TABLET | Refills: 0 | Status: SHIPPED | OUTPATIENT
Start: 2021-12-23 | End: 2022-07-08 | Stop reason: SDUPTHER

## 2021-12-23 RX ORDER — CEPHALEXIN 500 MG/1
500 CAPSULE ORAL EVERY 8 HOURS SCHEDULED
Qty: 21 CAPSULE | Refills: 0 | Status: SHIPPED | OUTPATIENT
Start: 2021-12-23 | End: 2021-12-24 | Stop reason: SDUPTHER

## 2021-12-23 RX ORDER — HYDROCHLOROTHIAZIDE 25 MG/1
25 TABLET ORAL DAILY
Qty: 90 TABLET | Refills: 0 | Status: SHIPPED | OUTPATIENT
Start: 2021-12-23 | End: 2022-07-08 | Stop reason: SDUPTHER

## 2021-12-23 RX ORDER — DICLOFENAC SODIUM 75 MG/1
75 TABLET, DELAYED RELEASE ORAL 2 TIMES DAILY
Qty: 40 TABLET | Refills: 0 | Status: SHIPPED | OUTPATIENT
Start: 2021-12-23

## 2021-12-24 ENCOUNTER — HOSPITAL ENCOUNTER (EMERGENCY)
Facility: HOSPITAL | Age: 41
Discharge: HOME | End: 2021-12-24
Admitting: EMERGENCY MEDICINE
Payer: COMMERCIAL

## 2021-12-24 ENCOUNTER — APPOINTMENT (EMERGENCY)
Dept: RADIOLOGY | Facility: HOSPITAL | Age: 41
End: 2021-12-24
Payer: COMMERCIAL

## 2021-12-24 ENCOUNTER — APPOINTMENT (EMERGENCY)
Dept: NON INVASIVE DIAGNOSTICS | Facility: HOSPITAL | Age: 41
End: 2021-12-24
Payer: COMMERCIAL

## 2021-12-24 VITALS
WEIGHT: 315 LBS | OXYGEN SATURATION: 97 % | SYSTOLIC BLOOD PRESSURE: 160 MMHG | DIASTOLIC BLOOD PRESSURE: 90 MMHG | BODY MASS INDEX: 54 KG/M2 | TEMPERATURE: 96.8 F | HEART RATE: 93 BPM | RESPIRATION RATE: 23 BRPM

## 2021-12-24 DIAGNOSIS — R50.9 FEVER, UNSPECIFIED FEVER CAUSE: ICD-10-CM

## 2021-12-24 DIAGNOSIS — M79.604 RIGHT LEG PAIN: ICD-10-CM

## 2021-12-24 DIAGNOSIS — L03.115 CELLULITIS OF RIGHT LOWER EXTREMITY: ICD-10-CM

## 2021-12-24 LAB
ALBUMIN SERPL BCP-MCNC: 3.5 G/DL (ref 3.5–5)
ALP SERPL-CCNC: 129 U/L (ref 46–116)
ALT SERPL W P-5'-P-CCNC: 50 U/L (ref 12–78)
ANION GAP SERPL CALCULATED.3IONS-SCNC: 9 MMOL/L (ref 4–13)
APTT PPP: 38 SECONDS (ref 23–37)
AST SERPL W P-5'-P-CCNC: 31 U/L (ref 5–45)
BASOPHILS # BLD AUTO: 0.03 THOUSANDS/ΜL (ref 0–0.1)
BASOPHILS NFR BLD AUTO: 0 % (ref 0–1)
BILIRUB SERPL-MCNC: 2.07 MG/DL (ref 0.2–1)
BUN SERPL-MCNC: 13 MG/DL (ref 5–25)
CALCIUM SERPL-MCNC: 9.3 MG/DL (ref 8.3–10.1)
CARDIAC TROPONIN I PNL SERPL HS: 4 NG/L
CHLORIDE SERPL-SCNC: 104 MMOL/L (ref 100–108)
CO2 SERPL-SCNC: 29 MMOL/L (ref 21–32)
CREAT SERPL-MCNC: 1.08 MG/DL (ref 0.6–1.3)
EOSINOPHIL # BLD AUTO: 0.03 THOUSAND/ΜL (ref 0–0.61)
EOSINOPHIL NFR BLD AUTO: 0 % (ref 0–6)
ERYTHROCYTE [DISTWIDTH] IN BLOOD BY AUTOMATED COUNT: 12.8 % (ref 11.6–15.1)
GFR SERPL CREATININE-BSD FRML MDRD: 84 ML/MIN/1.73SQ M
GLUCOSE SERPL-MCNC: 100 MG/DL (ref 65–140)
HCT VFR BLD AUTO: 46.5 % (ref 36.5–49.3)
HGB BLD-MCNC: 15.9 G/DL (ref 12–17)
IMM GRANULOCYTES # BLD AUTO: 0.06 THOUSAND/UL (ref 0–0.2)
IMM GRANULOCYTES NFR BLD AUTO: 1 % (ref 0–2)
INR PPP: 1.06 (ref 0.84–1.19)
LACTATE SERPL-SCNC: 1.4 MMOL/L (ref 0.5–2)
LYMPHOCYTES # BLD AUTO: 1.1 THOUSANDS/ΜL (ref 0.6–4.47)
LYMPHOCYTES NFR BLD AUTO: 9 % (ref 14–44)
MAGNESIUM SERPL-MCNC: 2.2 MG/DL (ref 1.6–2.6)
MCH RBC QN AUTO: 29.2 PG (ref 26.8–34.3)
MCHC RBC AUTO-ENTMCNC: 34.2 G/DL (ref 31.4–37.4)
MCV RBC AUTO: 86 FL (ref 82–98)
MONOCYTES # BLD AUTO: 0.52 THOUSAND/ΜL (ref 0.17–1.22)
MONOCYTES NFR BLD AUTO: 4 % (ref 4–12)
NEUTROPHILS # BLD AUTO: 11.24 THOUSANDS/ΜL (ref 1.85–7.62)
NEUTS SEG NFR BLD AUTO: 86 % (ref 43–75)
NRBC BLD AUTO-RTO: 0 /100 WBCS
PLATELET # BLD AUTO: 237 THOUSANDS/UL (ref 149–390)
PMV BLD AUTO: 11.5 FL (ref 8.9–12.7)
POTASSIUM SERPL-SCNC: 4 MMOL/L (ref 3.5–5.3)
PROT SERPL-MCNC: 8 G/DL (ref 6.4–8.2)
PROTHROMBIN TIME: 13.7 SECONDS (ref 11.6–14.5)
RBC # BLD AUTO: 5.44 MILLION/UL (ref 3.88–5.62)
SODIUM SERPL-SCNC: 142 MMOL/L (ref 136–145)
WBC # BLD AUTO: 12.98 THOUSAND/UL (ref 4.31–10.16)

## 2021-12-24 PROCEDURE — 83735 ASSAY OF MAGNESIUM: CPT | Performed by: PHYSICIAN ASSISTANT

## 2021-12-24 PROCEDURE — 99284 EMERGENCY DEPT VISIT MOD MDM: CPT

## 2021-12-24 PROCEDURE — 85730 THROMBOPLASTIN TIME PARTIAL: CPT | Performed by: PHYSICIAN ASSISTANT

## 2021-12-24 PROCEDURE — 36415 COLL VENOUS BLD VENIPUNCTURE: CPT | Performed by: PHYSICIAN ASSISTANT

## 2021-12-24 PROCEDURE — 93971 EXTREMITY STUDY: CPT

## 2021-12-24 PROCEDURE — 80053 COMPREHEN METABOLIC PANEL: CPT | Performed by: PHYSICIAN ASSISTANT

## 2021-12-24 PROCEDURE — 93005 ELECTROCARDIOGRAM TRACING: CPT

## 2021-12-24 PROCEDURE — 99285 EMERGENCY DEPT VISIT HI MDM: CPT | Performed by: PHYSICIAN ASSISTANT

## 2021-12-24 PROCEDURE — 84484 ASSAY OF TROPONIN QUANT: CPT | Performed by: PHYSICIAN ASSISTANT

## 2021-12-24 PROCEDURE — 85025 COMPLETE CBC W/AUTO DIFF WBC: CPT | Performed by: PHYSICIAN ASSISTANT

## 2021-12-24 PROCEDURE — 93971 EXTREMITY STUDY: CPT | Performed by: SURGERY

## 2021-12-24 PROCEDURE — 83605 ASSAY OF LACTIC ACID: CPT | Performed by: PHYSICIAN ASSISTANT

## 2021-12-24 PROCEDURE — 85610 PROTHROMBIN TIME: CPT | Performed by: PHYSICIAN ASSISTANT

## 2021-12-24 PROCEDURE — 96365 THER/PROPH/DIAG IV INF INIT: CPT

## 2021-12-24 PROCEDURE — 71045 X-RAY EXAM CHEST 1 VIEW: CPT

## 2021-12-24 PROCEDURE — 96375 TX/PRO/DX INJ NEW DRUG ADDON: CPT

## 2021-12-24 RX ORDER — SULFAMETHOXAZOLE AND TRIMETHOPRIM 800; 160 MG/1; MG/1
1 TABLET ORAL ONCE
Status: COMPLETED | OUTPATIENT
Start: 2021-12-24 | End: 2021-12-24

## 2021-12-24 RX ORDER — KETOROLAC TROMETHAMINE 30 MG/ML
15 INJECTION, SOLUTION INTRAMUSCULAR; INTRAVENOUS ONCE
Status: COMPLETED | OUTPATIENT
Start: 2021-12-24 | End: 2021-12-24

## 2021-12-24 RX ORDER — LISINOPRIL 10 MG/1
10 TABLET ORAL ONCE
Status: COMPLETED | OUTPATIENT
Start: 2021-12-24 | End: 2021-12-24

## 2021-12-24 RX ORDER — HYDROCHLOROTHIAZIDE 25 MG/1
25 TABLET ORAL ONCE
Status: COMPLETED | OUTPATIENT
Start: 2021-12-24 | End: 2021-12-24

## 2021-12-24 RX ORDER — CEFTRIAXONE 2 G/50ML
2000 INJECTION, SOLUTION INTRAVENOUS ONCE
Status: COMPLETED | OUTPATIENT
Start: 2021-12-24 | End: 2021-12-24

## 2021-12-24 RX ORDER — SULFAMETHOXAZOLE AND TRIMETHOPRIM 800; 160 MG/1; MG/1
1 TABLET ORAL 2 TIMES DAILY
Qty: 14 TABLET | Refills: 0 | Status: SHIPPED | OUTPATIENT
Start: 2021-12-24 | End: 2021-12-31

## 2021-12-24 RX ORDER — CEPHALEXIN 500 MG/1
500 CAPSULE ORAL EVERY 6 HOURS SCHEDULED
Qty: 40 CAPSULE | Refills: 0 | Status: SHIPPED | OUTPATIENT
Start: 2021-12-24 | End: 2022-01-03

## 2021-12-24 RX ADMIN — HYDROCHLOROTHIAZIDE 25 MG: 25 TABLET ORAL at 12:53

## 2021-12-24 RX ADMIN — CEFTRIAXONE 2000 MG: 2 INJECTION, SOLUTION INTRAVENOUS at 12:59

## 2021-12-24 RX ADMIN — LISINOPRIL 10 MG: 10 TABLET ORAL at 12:53

## 2021-12-24 RX ADMIN — KETOROLAC TROMETHAMINE 15 MG: 30 INJECTION, SOLUTION INTRAMUSCULAR at 12:57

## 2021-12-24 RX ADMIN — SODIUM CHLORIDE 1000 ML: 0.9 INJECTION, SOLUTION INTRAVENOUS at 12:57

## 2021-12-24 RX ADMIN — SULFAMETHOXAZOLE AND TRIMETHOPRIM 1 TABLET: 800; 160 TABLET ORAL at 13:57

## 2021-12-27 LAB
ATRIAL RATE: 96 BPM
P AXIS: 27 DEGREES
PR INTERVAL: 134 MS
QRS AXIS: -57 DEGREES
QRSD INTERVAL: 140 MS
QT INTERVAL: 388 MS
QTC INTERVAL: 490 MS
T WAVE AXIS: 17 DEGREES
VENTRICULAR RATE: 96 BPM

## 2021-12-27 PROCEDURE — 93010 ELECTROCARDIOGRAM REPORT: CPT | Performed by: INTERNAL MEDICINE

## 2022-03-09 DIAGNOSIS — L03.115 CELLULITIS OF RIGHT LOWER EXTREMITY: Primary | ICD-10-CM

## 2022-03-09 RX ORDER — CEPHALEXIN 500 MG/1
500 CAPSULE ORAL EVERY 6 HOURS SCHEDULED
Qty: 40 CAPSULE | Refills: 0 | Status: SHIPPED | OUTPATIENT
Start: 2022-03-09 | End: 2022-03-19

## 2022-03-09 RX ORDER — ACETAMINOPHEN AND CODEINE PHOSPHATE 300; 30 MG/1; MG/1
1 TABLET ORAL EVERY 4 HOURS PRN
Qty: 30 TABLET | Refills: 0 | Status: SHIPPED | OUTPATIENT
Start: 2022-03-09 | End: 2022-07-08 | Stop reason: SDUPTHER

## 2022-06-20 NOTE — ASSESSMENT & PLAN NOTE
Resolved Taltz Pregnancy And Lactation Text: The risk during pregnancy and breastfeeding is uncertain with this medication.

## 2022-07-03 ENCOUNTER — NURSE TRIAGE (OUTPATIENT)
Dept: OTHER | Facility: OTHER | Age: 42
End: 2022-07-03

## 2022-07-03 NOTE — TELEPHONE ENCOUNTER
Regarding: cellulitis  ----- Message from Jaciel Gonsales sent at 7/3/2022  9:33 AM EDT -----  "His Left lower leg has cellulitis "

## 2022-07-03 NOTE — TELEPHONE ENCOUNTER
I will call in a day supply of antibiotic but he has to be seen in the office on Wed  Answer Assessment - Initial Assessment Questions  1  ONSET: "When did the swelling start?" (e g , minutes, hours, days)      yesterday  2  LOCATION: "What part of the leg is swollen?"  "Are both legs swollen or just one leg?"      Right leg   3  SEVERITY: "How bad is the swelling?" (e g , localized; mild, moderate, severe)   - Localized - small area of swelling localized to one leg   - MILD pedal edema - swelling limited to foot and ankle, pitting edema < 1/4 inch (6 mm) deep, rest and elevation eliminate most or all swelling   - MODERATE edema - swelling of lower leg to knee, pitting edema > 1/4 inch (6 mm) deep, rest and elevation only partially reduce swelling   - SEVERE edema - swelling extends above knee, facial or hand swelling present       mild  4  REDNESS: "Does the swelling look red or infected?"      Yes   5  PAIN: "Is the swelling painful to touch?" If Yes, ask: "How painful is it?"   (Scale 1-10; mild, moderate or severe)      Mild   6  FEVER: "Do you have a fever?" If Yes, ask: "What is it, how was it measured, and when did it start?"       Not now  7  CAUSE: "What do you think is causing the leg swelling?"      Chronic Cellulitis   8  MEDICAL HISTORY: "Do you have a history of heart failure, kidney disease, liver failure, or cancer?"     no  9  RECURRENT SYMPTOM: "Have you had leg swelling before?" If Yes, ask: "When was the last time?" "What happened that time?"      Yes ,few months ago Antibiotic   10   OTHER SYMPTOMS: "Do you have any other symptoms?" (e g , chest pain, difficulty breathing)       Chills , body aches    Protocols used: LEG SWELLING AND EDEMA-ADULT-

## 2022-07-08 ENCOUNTER — OFFICE VISIT (OUTPATIENT)
Dept: FAMILY MEDICINE CLINIC | Facility: CLINIC | Age: 42
End: 2022-07-08
Payer: COMMERCIAL

## 2022-07-08 VITALS
BODY MASS INDEX: 46.65 KG/M2 | OXYGEN SATURATION: 95 % | TEMPERATURE: 97.8 F | HEIGHT: 69 IN | WEIGHT: 315 LBS | SYSTOLIC BLOOD PRESSURE: 130 MMHG | DIASTOLIC BLOOD PRESSURE: 90 MMHG | HEART RATE: 100 BPM

## 2022-07-08 DIAGNOSIS — I89.0 LYMPHEDEMA OF RIGHT LOWER EXTREMITY: ICD-10-CM

## 2022-07-08 DIAGNOSIS — Z00.00 ANNUAL PHYSICAL EXAM: ICD-10-CM

## 2022-07-08 DIAGNOSIS — E66.01 MORBID OBESITY WITH BMI OF 50.0-59.9, ADULT (HCC): ICD-10-CM

## 2022-07-08 DIAGNOSIS — M79.604 RIGHT LEG PAIN: ICD-10-CM

## 2022-07-08 DIAGNOSIS — L03.90 CHRONIC CELLULITIS: ICD-10-CM

## 2022-07-08 DIAGNOSIS — L03.115 CELLULITIS OF RIGHT LOWER EXTREMITY: Primary | ICD-10-CM

## 2022-07-08 PROCEDURE — 99396 PREV VISIT EST AGE 40-64: CPT | Performed by: NURSE PRACTITIONER

## 2022-07-08 PROCEDURE — 3725F SCREEN DEPRESSION PERFORMED: CPT | Performed by: NURSE PRACTITIONER

## 2022-07-08 RX ORDER — CEPHALEXIN 500 MG/1
500 CAPSULE ORAL EVERY 8 HOURS SCHEDULED
Qty: 42 CAPSULE | Refills: 0 | Status: SHIPPED | OUTPATIENT
Start: 2022-07-08 | End: 2022-07-22

## 2022-07-08 NOTE — PROGRESS NOTES
ADULT ANNUAL [facility] PRIMARY CARE    NAME: Matias Pereira  AGE: 36 y o  SEX: male  : 1980     DATE: 2022     Assessment and Plan:     Problem List Items Addressed This Visit        Other    Cellulitis of right lower extremity - Primary    Relevant Medications    cephalexin (KEFLEX) 500 mg capsule    Other Relevant Orders    VAS lower limb arterial duplex, limited/unilateral      Other Visit Diagnoses     Right leg pain        Relevant Medications    cephalexin (KEFLEX) 500 mg capsule    Other Relevant Orders    VAS lower limb arterial duplex, limited/unilateral    Chronic cellulitis        Relevant Medications    cephalexin (KEFLEX) 500 mg capsule    Other Relevant Orders    VAS lower limb arterial duplex, limited/unilateral    Annual physical exam        Relevant Orders    HEMOGLOBIN A1C W/ EAG ESTIMATION    Comprehensive metabolic panel    CBC and differential    Morbid obesity with BMI of 50 0-59 9, adult (HCC)        Lymphedema of right lower extremity        Relevant Orders    VAS lower limb arterial duplex, limited/unilateral          Immunizations and preventive care screenings were discussed with patient today  Appropriate education was printed on patient's after visit summary  Counseling:  · right leg cellulitis    BMI Counseling: Body mass index is 52 34 kg/m²  The BMI is above normal  Nutrition recommendations include encouraging healthy choices of fruits and vegetables  Rationale for BMI follow-up plan is due to patient being overweight or obese  Depression Screening and Follow-up Plan: Patient was screened for depression during today's encounter  They screened negative with a PHQ-2 score of 0  Will have him continue antibiotic therapy  Right lower leg appears to be lymphedema, redness but no discoloration to indicate venous issues  Will have an arterial duplex done    Advised to start wearing a compression stocking when he works once the antibiotics are completed  Screening labs ordered  Return in about 2 weeks (around 7/22/2022) for Recheck  Chief Complaint:     Chief Complaint   Patient presents with    Physical Exam    Care Gap Request     Bmi follow up         History of Present Illness:     Adult Annual Physical   Patient here for a comprehensive physical exam  The patient reports problems - history of right leg cellulitis  recurring every few months,improves with antibiotics       Diet and Physical Activity  · Diet/Nutrition: poor diet  · Exercise: no formal exercise  Depression Screening  PHQ-2/9 Depression Screening    Little interest or pleasure in doing things: 0 - not at all  Feeling down, depressed, or hopeless: 0 - not at all  PHQ-2 Score: 0  PHQ-2 Interpretation: Negative depression screen       General Health  · Sleep: sleeps well  · Hearing: normal - bilateral   · Vision: no vision problems  · Dental: brushes teeth twice daily   Health  · Symptoms include: none     Review of Systems:     Review of Systems   Constitutional: Negative  Respiratory: Negative  Cardiovascular: Negative  Skin:        See above   Neurological: Negative  All other systems reviewed and are negative  Past Medical History:     Past Medical History:   Diagnosis Date    Hypertension       Past Surgical History:     History reviewed  No pertinent surgical history     Family History:     Family History   Problem Relation Age of Onset    Diabetes Mother     Hypertension Father     Kidney disease Father       Social History:     Social History     Socioeconomic History    Marital status: Single     Spouse name: None    Number of children: None    Years of education: None    Highest education level: None   Occupational History    None   Tobacco Use    Smoking status: Never Smoker    Smokeless tobacco: Never Used   Vaping Use    Vaping Use: Never used   Substance and Sexual Activity    Alcohol use: Not Currently    Drug use: Not Currently    Sexual activity: Yes     Partners: Female   Other Topics Concern    None   Social History Narrative    None     Social Determinants of Health     Financial Resource Strain: Not on file   Food Insecurity: Not on file   Transportation Needs: Not on file   Physical Activity: Not on file   Stress: Not on file   Social Connections: Not on file   Intimate Partner Violence: Not on file   Housing Stability: Not on file      Current Medications:     Current Outpatient Medications   Medication Sig Dispense Refill    acetaminophen-codeine (TYLENOL #3) 300-30 mg per tablet Take 1 tablet by mouth every 4 (four) hours as needed for moderate pain 30 tablet 0    cephalexin (KEFLEX) 500 mg capsule Take 1 capsule (500 mg total) by mouth every 8 (eight) hours for 14 days 42 capsule 0    hydrochlorothiazide (HYDRODIURIL) 25 mg tablet Take 1 tablet (25 mg total) by mouth daily 90 tablet 0    lisinopril (ZESTRIL) 10 mg tablet Take 1 tablet (10 mg total) by mouth daily 90 tablet 0    diclofenac (VOLTAREN) 75 mg EC tablet Take 1 tablet (75 mg total) by mouth 2 (two) times a day As needed for leg pain (Patient not taking: Reported on 7/8/2022) 40 tablet 0     No current facility-administered medications for this visit  Allergies:     No Known Allergies   Physical Exam:     /90 (BP Location: Right arm, Patient Position: Sitting, Cuff Size: Standard)   Pulse 100   Temp 97 8 °F (36 6 °C)   Ht 5' 9" (1 753 m)   Wt (!) 161 kg (354 lb 6 4 oz)   SpO2 95%   BMI 52 34 kg/m²     Physical Exam  Constitutional:       Appearance: Normal appearance  HENT:      Head: Normocephalic and atraumatic  Cardiovascular:      Rate and Rhythm: Normal rate and regular rhythm  Pulses:           Dorsalis pedis pulses are 2+ on the right side and 2+ on the left side  Heart sounds: No murmur heard  No gallop     Pulmonary:      Effort: Pulmonary effort is normal  No respiratory distress  Breath sounds: Normal breath sounds  No wheezing or rales  Musculoskeletal:      Cervical back: Neck supple  Feet:      Right foot:      Skin integrity: No ulcer, skin breakdown, erythema, warmth, callus or dry skin  Left foot:      Skin integrity: No ulcer, skin breakdown, erythema, warmth, callus or dry skin  Skin:         Neurological:      General: No focal deficit present  Mental Status: He is alert and oriented to person, place, and time  Mental status is at baseline  Psychiatric:         Mood and Affect: Mood normal          Behavior: Behavior normal          Thought Content:  Thought content normal          Judgment: Judgment normal           KELLI Palacios  [facility]

## 2022-07-08 NOTE — PATIENT INSTRUCTIONS
Compression stockings - may purchase at [employer], [employer], or [employer] - do not use until after antibiotics are finished  Wellness Visit for Adults   AMBULATORY CARE:   A wellness visit  is when you see your healthcare provider to get screened for health problems  Your healthcare provider will also give you advice on how to stay healthy  Write down your questions so you remember to ask them  Ask your healthcare provider how often you should have a wellness visit  What happens at a wellness visit:  Your healthcare provider will ask about your health, and your family history of health problems  This includes high blood pressure, heart disease, and cancer  He or she will ask if you have symptoms that concern you, if you smoke, and about your mood  You may also be asked about your intake of medicines, supplements, food, and alcohol  Any of the following may be done:  · Your weight  will be checked  Your height may also be checked so your body mass index (BMI) can be calculated  Your BMI shows if you are at a healthy weight  · Your blood pressure  and heart rate will be checked  Your temperature may also be checked  · Blood and urine tests  may be done  Blood tests may be done to check your cholesterol levels  Abnormal cholesterol levels increase your risk for heart disease and stroke  You may also need a blood or urine test to check for diabetes if you are at increased risk  Urine tests may be done to look for signs of an infection or kidney disease  · A physical exam  includes checking your heartbeat and lungs with a stethoscope  Your healthcare provider may also check your skin to look for sun damage  · Screening tests  may be recommended  A screening test is done to check for diseases that may not cause symptoms  The screening tests you may need depend on your age, gender, family history, and lifestyle habits   For example, colorectal screening may be recommended if you are 48years old or older     Screening tests you need if you are a woman:   · A Pap smear  is used to screen for cervical cancer  Pap smears are usually done every 3 to 5 years depending on your age  You may need them more often if you have had abnormal Pap smear test results in the past  Ask your healthcare provider how often you should have a Pap smear  · A mammogram  is an x-ray of your breasts to screen for breast cancer  Experts recommend mammograms every 2 years starting at age 48 years  You may need a mammogram at age 52 years or younger if you have an increased risk for breast cancer  Talk to your healthcare provider about when you should start having mammograms and how often you need them  Vaccines you may need:   · Get an influenza vaccine  every year  The influenza vaccine protects you from the flu  Several types of viruses cause the flu  The viruses change over time, so new vaccines are made each year  · Get a tetanus-diphtheria (Td) booster vaccine  every 10 years  This vaccine protects you against tetanus and diphtheria  Tetanus is a severe infection that may cause painful muscle spasms and lockjaw  Diphtheria is a severe bacterial infection that causes a thick covering in the back of your mouth and throat  · Get a human papillomavirus (HPV) vaccine  if you are female and aged 23 to 32 or male 23 to 24 and never received it  This vaccine protects you from HPV infection  HPV is the most common infection spread by sexual contact  HPV may also cause vaginal, penile, and anal cancers  · Get a pneumococcal vaccine  if you are aged 72 years or older  The pneumococcal vaccine is an injection given to protect you from pneumococcal disease  Pneumococcal disease is an infection caused by pneumococcal bacteria  The infection may cause pneumonia, meningitis, or an ear infection  · Get a shingles vaccine  if you are 60 or older, even if you have had shingles before   The shingles vaccine is an injection to protect you from the varicella-zoster virus  This is the same virus that causes chickenpox  Shingles is a painful rash that develops in people who had chickenpox or have been exposed to the virus  How to eat healthy:  My Plate is a model for planning healthy meals  It shows the types and amounts of foods that should go on your plate  Fruits and vegetables make up about half of your plate, and grains and protein make up the other half  A serving of dairy is included on the side of your plate  The amount of calories and serving sizes you need depends on your age, gender, weight, and height  Examples of healthy foods are listed below:  · Eat a variety of vegetables  such as dark green, red, and orange vegetables  You can also include canned vegetables low in sodium (salt) and frozen vegetables without added butter or sauces  · Eat a variety of fresh fruits , canned fruit in 100% juice, frozen fruit, and dried fruit  · Include whole grains  At least half of the grains you eat should be whole grains  Examples include whole-wheat bread, wheat pasta, brown rice, and whole-grain cereals such as oatmeal     · Eat a variety of protein foods such as seafood (fish and shellfish), lean meat, and poultry without skin (turkey and chicken)  Examples of lean meats include pork leg, shoulder, or tenderloin, and beef round, sirloin, tenderloin, and extra lean ground beef  Other protein foods include eggs and egg substitutes, beans, peas, soy products, nuts, and seeds  · Choose low-fat dairy products such as skim or 1% milk or low-fat yogurt, cheese, and cottage cheese  · Limit unhealthy fats  such as butter, hard margarine, and shortening  Exercise:  Exercise at least 30 minutes per day on most days of the week  Some examples of exercise include walking, biking, dancing, and swimming  You can also fit in more physical activity by taking the stairs instead of the elevator or parking farther away from stores   Include muscle strengthening activities 2 days each week  Regular exercise provides many health benefits  It helps you manage your weight, and decreases your risk for type 2 diabetes, heart disease, stroke, and high blood pressure  Exercise can also help improve your mood  Ask your healthcare provider about the best exercise plan for you  General health and safety guidelines:   · Do not smoke  Nicotine and other chemicals in cigarettes and cigars can cause lung damage  Ask your healthcare provider for information if you currently smoke and need help to quit  E-cigarettes or smokeless tobacco still contain nicotine  Talk to your healthcare provider before you use these products  · Limit alcohol  A drink of alcohol is 12 ounces of beer, 5 ounces of wine, or 1½ ounces of liquor  · Lose weight, if needed  Being overweight increases your risk of certain health conditions  These include heart disease, high blood pressure, type 2 diabetes, and certain types of cancer  · Protect your skin  Do not sunbathe or use tanning beds  Use sunscreen with a SPF 15 or higher  Apply sunscreen at least 15 minutes before you go outside  Reapply sunscreen every 2 hours  Wear protective clothing, hats, and sunglasses when you are outside  · Drive safely  Always wear your seatbelt  Make sure everyone in your car wears a seatbelt  A seatbelt can save your life if you are in an accident  Do not use your cell phone when you are driving  This could distract you and cause an accident  Pull over if you need to make a call or send a text message  · Practice safe sex  Use latex condoms if are sexually active and have more than one partner  Your healthcare provider may recommend screening tests for sexually transmitted infections (STIs)  · Wear helmets, lifejackets, and protective gear  Always wear a helmet when you ride a bike or motorcycle, go skiing, or play sports that could cause a head injury   Wear protective equipment when you play sports  Wear a lifejacket when you are on a boat or doing water sports  © Copyright [employer] 2022 Information is for End User's use only and may not be sold, redistributed or otherwise used for commercial purposes  All illustrations and images included in CareNotes® are the copyrighted property of [employer]  or Ankit Gallagher   The above information is an  only  It is not intended as medical advice for individual conditions or treatments  Talk to your doctor, nurse or pharmacist before following any medical regimen to see if it is safe and effective for you

## 2022-08-04 ENCOUNTER — OFFICE VISIT (OUTPATIENT)
Dept: FAMILY MEDICINE CLINIC | Facility: CLINIC | Age: 42
End: 2022-08-04
Payer: COMMERCIAL

## 2022-08-04 VITALS
SYSTOLIC BLOOD PRESSURE: 124 MMHG | OXYGEN SATURATION: 95 % | HEART RATE: 91 BPM | TEMPERATURE: 97.3 F | DIASTOLIC BLOOD PRESSURE: 84 MMHG | WEIGHT: 315 LBS | BODY MASS INDEX: 46.65 KG/M2 | HEIGHT: 69 IN

## 2022-08-04 DIAGNOSIS — L03.90 CHRONIC CELLULITIS: ICD-10-CM

## 2022-08-04 DIAGNOSIS — L03.115 CELLULITIS OF RIGHT LOWER EXTREMITY: Primary | ICD-10-CM

## 2022-08-04 PROCEDURE — 99213 OFFICE O/P EST LOW 20 MIN: CPT | Performed by: NURSE PRACTITIONER

## 2022-08-04 NOTE — PROGRESS NOTES
Assessment/Plan:       Diagnoses and all orders for this visit:    Cellulitis of right lower extremity    Chronic cellulitis     Right leg with noted improvement  He will still get the right leg duplex done and the ordered blood work  Recommended he keep his right leg moisturized  - Eucerin cream if able after shower  Subjective:      Patient ID: Leopoldo Gaona is a 36 y o  male  Here today for a follow-up  - he is with a hx of chronic right leg cellulitis  Recently treated again with oral abx therapy about 4 weeks ago  Reports of improvement at this time  Right leg is still mildly swollen when compared to the left but still improved  The following portions of the patient's history were reviewed and updated as appropriate: allergies, current medications, past family history, past medical history, past social history, past surgical history and problem list     Review of Systems   Constitutional: Negative  Respiratory: Negative  Cardiovascular: Positive for leg swelling (right)  Neurological: Negative  All other systems reviewed and are negative  Objective:      /84 (BP Location: Left arm, Patient Position: Sitting)   Pulse 91   Temp (!) 97 3 °F (36 3 °C)   Ht 5' 9" (1 753 m)   Wt (!) 160 kg (352 lb 6 4 oz)   SpO2 95%   BMI 52 04 kg/m²          Physical Exam  Skin:     Comments: Mild redness to right lower leg  Slight discoloration to right lower leg  Mild swelling to right lower leg  Neurological:      Mental Status: He is alert and oriented to person, place, and time

## 2022-11-10 NOTE — PROGRESS NOTES
Progress Note - Carolee Honeycutt 1980, 35 y o  male MRN: [id number]    Unit/Bed#: -01 Encounter: [id number]    Primary Care Provider: No primary care provider on file  Date and time admitted to hospital: 12/30/2019 11:54 AM        Sepsis due to cellulitis [facility])  Assessment & Plan  Due to cellulitis  Elevated WBC and tachycardic  Rise of temperature at home  Continue current management    Super obesity  Assessment & Plan  Low fat low salt diet  Nutritional consult  TLC son outpatient    Essential hypertension  Assessment & Plan  Blood pressure appears to be better controlled when compared to yesterday  Monitor    Elevated bilirubin  Assessment & Plan  Unknown etiology  Minimal elevation of the bilirubin noted  No transaminitis  Ultrasound reviewed    Hypokalemia  Assessment & Plan  Continue with supplementation      * Cellulitis of right lower extremity  Assessment & Plan  Outpatient treatment failed with Keflex,  Continue with vancomycin  Improving   Also erythema is receding from the line marked at the time of admission  Follow-up pharmacy consult for vanc trough  Follow-up blood culture, wound culture  Leukocytosis resolved  Recheck in the morning   Still with pain, no dvt on doppler      VTE Pharmacologic Prophylaxis:   Pharmacologic: Enoxaparin (Lovenox)  Mechanical VTE Prophylaxis in Place: Yes    Patient Centered Rounds: I have performed bedside rounds with nursing staff today  Discussions with Specialists or Other Care Team Provider:     Education and Discussions with Family / Patient:  Patient and family    Time Spent for Care: 30 minutes  More than 50% of total time spent on counseling and coordination of care as described above      Current Length of Stay: 3 day(s)    Current Patient Status: Inpatient   Certification Statement: The patient will continue to require additional inpatient hospital stay due to IV antibiotics    Discharge Plan:     Code Status: Level 1 - Full Recommend warm compresses. Code      Subjective:   Patient seen and examined  Still with the pain  Erythema is improving    Objective:     Vitals:   Temp (24hrs), Av 2 °F (36 8 °C), Min:98 1 °F (36 7 °C), Max:98 4 °F (36 9 °C)    Temp:  [98 1 °F (36 7 °C)-98 4 °F (36 9 °C)] 98 4 °F (36 9 °C)  HR:  [77-80] 80  Resp:  [16-18] 18  BP: (131-135)/(82-88) 131/86  SpO2:  [98 %-99 %] 99 %  Body mass index is 52 39 kg/m²  Input and Output Summary (last 24 hours): Intake/Output Summary (Last 24 hours) at 2020 1724  Last data filed at 2020 8319  Gross per 24 hour   Intake 240 ml   Output --   Net 240 ml       Physical Exam:     Physical Exam   Constitutional: He appears well-developed  No distress  HENT:   Head: Normocephalic and atraumatic  Eyes: Right eye exhibits no discharge  Left eye exhibits no discharge  Neck: No JVD present  Cardiovascular: Normal rate and regular rhythm  No murmur heard  Pulmonary/Chest: Effort normal and breath sounds normal  No stridor  No respiratory distress  Abdominal: Soft  Bowel sounds are normal  He exhibits no distension  There is no tenderness  Musculoskeletal: Normal range of motion  He exhibits no edema  Right lower extremity erythema is improving  Bullae noted   Neurological: He is alert  No cranial nerve deficit  Skin: Skin is warm  Additional Data:     Labs:    Results from last 7 days   Lab Units 20  0552  19  0512   WBC Thousand/uL 7 38   < > 10 18*   HEMOGLOBIN g/dL 13 6   < > 13 4   HEMATOCRIT % 39 7   < > 39 4   PLATELETS Thousands/uL 244   < > 229   NEUTROS PCT %  --   --  64   LYMPHS PCT %  --   --  24   MONOS PCT %  --   --  10   EOS PCT %  --   --  2    < > = values in this interval not displayed       Results from last 7 days   Lab Units 20  0552   POTASSIUM mmol/L 3 3*   CHLORIDE mmol/L 105   CO2 mmol/L 30   BUN mg/dL 8   CREATININE mg/dL 0 71   CALCIUM mg/dL 8 2*   ALK PHOS U/L 102   ALT U/L 53   AST U/L 36           * I Have Reviewed All Lab Data Listed Above  * Additional Pertinent Lab Tests Reviewed: [facility] 65 Admission Reviewed    Imaging:    Imaging Reports Reviewed Today Include:   Imaging Personally Reviewed by Myself Includes:      Recent Cultures (last 7 days):     Results from last 7 days   Lab Units 12/30/19  1543   GRAM STAIN RESULT  No Polys or Bacteria seen   WOUND CULTURE  Culture results to follow  Last 24 Hours Medication List:     Current Facility-Administered Medications:  acetaminophen 650 mg Oral Q6H PRN Tiffany Oliver, IGNACIONP    calcium carbonate 1,000 mg Oral Daily PRN Bautista Adler MD    docusate sodium 100 mg Oral BID Tara Hobson MD    enoxaparin 40 mg Subcutaneous Daily Tara Hobson MD    hydrochlorothiazide 25 mg Oral Daily Bautista Adler MD    HYDROmorphone 0 5 mg Intravenous Q1H PRN KELLI Kaplan    ondansetron 4 mg Intravenous Q6H PRN Tara Hobson MD    oxyCODONE 10 mg Oral Q4H PRN Itffany Melody, CRNP    oxyCODONE 5 mg Oral Q4H PRN Tiffany Melody, KELLI    senna 1 tablet Oral Daily Tara Hobson MD    vancomycin 2,000 mg Intravenous Shari Owen MD Last Rate: Stopped (01/02/20 1253)        Today, Patient Was Seen By: Dee Monge MD    ** Please Note: Dictation voice to text software may have been used in the creation of this document   **

## 2023-05-11 ENCOUNTER — OFFICE VISIT (OUTPATIENT)
Dept: FAMILY MEDICINE CLINIC | Facility: CLINIC | Age: 43
End: 2023-05-11

## 2023-05-11 VITALS
HEART RATE: 110 BPM | OXYGEN SATURATION: 96 % | SYSTOLIC BLOOD PRESSURE: 144 MMHG | BODY MASS INDEX: 46.65 KG/M2 | HEIGHT: 69 IN | DIASTOLIC BLOOD PRESSURE: 74 MMHG | WEIGHT: 315 LBS | TEMPERATURE: 98.5 F

## 2023-05-11 DIAGNOSIS — I10 ESSENTIAL HYPERTENSION: ICD-10-CM

## 2023-05-11 DIAGNOSIS — L03.115 CELLULITIS OF RIGHT LOWER EXTREMITY: Primary | ICD-10-CM

## 2023-05-11 DIAGNOSIS — M79.604 RIGHT LEG PAIN: ICD-10-CM

## 2023-05-11 PROBLEM — E66.01 MORBID OBESITY (HCC): Status: ACTIVE | Noted: 2019-12-30

## 2023-05-11 PROBLEM — E66.9 SUPER OBESITY: Status: RESOLVED | Noted: 2019-12-30 | Resolved: 2023-05-11

## 2023-05-11 RX ORDER — HYDROCHLOROTHIAZIDE 25 MG/1
25 TABLET ORAL DAILY
Qty: 90 TABLET | Refills: 3 | Status: SHIPPED | OUTPATIENT
Start: 2023-05-11 | End: 2023-05-18

## 2023-05-11 RX ORDER — LISINOPRIL 10 MG/1
10 TABLET ORAL DAILY
Qty: 90 TABLET | Refills: 3 | Status: SHIPPED | OUTPATIENT
Start: 2023-05-11 | End: 2023-05-18

## 2023-05-11 RX ORDER — CEPHALEXIN 500 MG/1
500 CAPSULE ORAL EVERY 8 HOURS SCHEDULED
Qty: 42 CAPSULE | Refills: 0 | Status: SHIPPED | OUTPATIENT
Start: 2023-05-11 | End: 2023-05-18

## 2023-05-11 RX ORDER — OXYCODONE HYDROCHLORIDE 5 MG/1
5 TABLET ORAL EVERY 4 HOURS PRN
Qty: 30 TABLET | Refills: 0 | Status: SHIPPED | OUTPATIENT
Start: 2023-05-11

## 2023-05-11 NOTE — LETTER
May 11, 2023     Patient: America Mcclellan  YOB: 1980  Date of Visit: 5/11/2023      To Whom it May Concern:    America Mcclellan is under my professional care  Donna Naranjo was seen in my office on 5/11/2023  Please excuse him from work on 05/11/2023, 05/12/2023, 05/13/2023, and 05/14/2023  Donna Naranjo may return to work on 05/15/2023  If you have any questions or concerns, please don't hesitate to call           Sincerely,          Edward Swartz

## 2023-05-11 NOTE — PROGRESS NOTES
Name: Mónica Oscar      : 1980      MRN: [id number]  Encounter Provider: KELLI Daniel  Encounter Date: 2023   Encounter department: [address] [place] [address] PRIMARY CARE    Assessment & Plan     1  Cellulitis of right lower extremity  -     cephalexin (KEFLEX) 500 mg capsule; Take 1 capsule (500 mg total) by mouth every 8 (eight) hours for 14 days    2  Essential hypertension  -     hydrochlorothiazide (HYDRODIURIL) 25 mg tablet; Take 1 tablet (25 mg total) by mouth daily  -     lisinopril (ZESTRIL) 10 mg tablet; Take 1 tablet (10 mg total) by mouth daily    3  Right leg pain  -     oxyCODONE (Roxicodone) 5 immediate release tablet; Take 1 tablet (5 mg total) by mouth every 4 (four) hours as needed for moderate pain Max Daily Amount: 30 mg      BMI Counseling: Body mass index is 53 28 kg/m²  The BMI is above normal  Nutrition recommendations include encouraging healthy choices of fruits and vegetables  Rationale for BMI follow-up plan is due to patient being overweight or obese  Will have him complete a 2 week course of Keflex  If it does not heal, he may need further dopplers to assess blood flow again  There is discoloration on the right posterior lower leg indicating possible PVD  Subjective      Here today with his wife - hx of right lower leg cellulitis  Reports yesterday he felt unwell which is usually the precursor to the cellulitis  Notes that he began with redness and swelling in his right lower leg this AM           Leg Pain       Review of Systems   Constitutional: Negative  Respiratory: Negative  Cardiovascular: Negative  Skin:        See HPI   Neurological: Negative  All other systems reviewed and are negative        Current Outpatient Medications on File Prior to Visit   Medication Sig   • [DISCONTINUED] acetaminophen-codeine (TYLENOL #3) 300-30 mg per tablet Take 1 tablet by mouth every 4 (four) hours as needed for moderate pain   • [DISCONTINUED] "diclofenac (VOLTAREN) 75 mg EC tablet Take 1 tablet (75 mg total) by mouth 2 (two) times a day As needed for leg pain   • [DISCONTINUED] hydrochlorothiazide (HYDRODIURIL) 25 mg tablet Take 1 tablet (25 mg total) by mouth daily   • [DISCONTINUED] lisinopril (ZESTRIL) 10 mg tablet Take 1 tablet (10 mg total) by mouth daily       Objective     /74 (BP Location: Right arm, Patient Position: Sitting, Cuff Size: Large)   Pulse (!) 110   Temp 98 5 °F (36 9 °C)   Ht 5' 9\" (1 753 m)   Wt (!) 164 kg (360 lb 12 8 oz)   SpO2 96%   BMI 53 28 kg/m²     Physical Exam  Constitutional:       Appearance: Normal appearance  Skin:     Findings: Erythema present  Comments: Swelling to right lower leg  Neurological:      Mental Status: He is alert and oriented to person, place, and time         Linford Breath, CRNP  "

## 2023-05-13 ENCOUNTER — APPOINTMENT (EMERGENCY)
Dept: NON INVASIVE DIAGNOSTICS | Facility: HOSPITAL | Age: 43
End: 2023-05-13

## 2023-05-13 ENCOUNTER — HOSPITAL ENCOUNTER (INPATIENT)
Facility: HOSPITAL | Age: 43
LOS: 5 days | Discharge: HOME | End: 2023-05-18
Attending: EMERGENCY MEDICINE | Admitting: INTERNAL MEDICINE

## 2023-05-13 DIAGNOSIS — I10 ESSENTIAL HYPERTENSION: ICD-10-CM

## 2023-05-13 DIAGNOSIS — S86.019A ACHILLES TENDON TEAR: ICD-10-CM

## 2023-05-13 DIAGNOSIS — R60.0 LEG EDEMA: ICD-10-CM

## 2023-05-13 DIAGNOSIS — L03.115 CELLULITIS OF RIGHT LEG: Primary | ICD-10-CM

## 2023-05-13 DIAGNOSIS — B35.3 TINEA PEDIS: ICD-10-CM

## 2023-05-13 PROBLEM — D72.829 LEUKOCYTOSIS: Status: ACTIVE | Noted: 2023-05-13

## 2023-05-13 LAB
ALBUMIN SERPL BCP-MCNC: 3.9 G/DL (ref 3.5–5)
ALP SERPL-CCNC: 114 U/L (ref 34–104)
ALT SERPL W P-5'-P-CCNC: 24 U/L (ref 7–52)
ANION GAP SERPL CALCULATED.3IONS-SCNC: 8 MMOL/L (ref 4–13)
APTT PPP: 36 SECONDS (ref 23–37)
AST SERPL W P-5'-P-CCNC: 23 U/L (ref 13–39)
BASOPHILS # BLD AUTO: 0.03 THOUSANDS/ÂΜL (ref 0–0.1)
BASOPHILS NFR BLD AUTO: 0 % (ref 0–1)
BILIRUB SERPL-MCNC: 1.53 MG/DL (ref 0.2–1)
BUN SERPL-MCNC: 11 MG/DL (ref 5–25)
CALCIUM SERPL-MCNC: 9.5 MG/DL (ref 8.4–10.2)
CHLORIDE SERPL-SCNC: 99 MMOL/L (ref 96–108)
CO2 SERPL-SCNC: 30 MMOL/L (ref 21–32)
CREAT SERPL-MCNC: 0.93 MG/DL (ref 0.6–1.3)
EOSINOPHIL # BLD AUTO: 0.04 THOUSAND/ÂΜL (ref 0–0.61)
EOSINOPHIL NFR BLD AUTO: 0 % (ref 0–6)
ERYTHROCYTE [DISTWIDTH] IN BLOOD BY AUTOMATED COUNT: 12.8 % (ref 11.6–15.1)
GFR SERPL CREATININE-BSD FRML MDRD: 99 ML/MIN/1.73SQ M
GLUCOSE SERPL-MCNC: 114 MG/DL (ref 65–140)
HCT VFR BLD AUTO: 47.9 % (ref 36.5–49.3)
HGB BLD-MCNC: 15.8 G/DL (ref 12–17)
IMM GRANULOCYTES # BLD AUTO: 0.04 THOUSAND/UL (ref 0–0.2)
IMM GRANULOCYTES NFR BLD AUTO: 0 % (ref 0–2)
INR PPP: 0.98 (ref 0.84–1.19)
LACTATE SERPL-SCNC: 0.8 MMOL/L (ref 0.5–2)
LIPASE SERPL-CCNC: 40 U/L (ref 11–82)
LYMPHOCYTES # BLD AUTO: 1.85 THOUSANDS/ÂΜL (ref 0.6–4.47)
LYMPHOCYTES NFR BLD AUTO: 17 % (ref 14–44)
MCH RBC QN AUTO: 28.1 PG (ref 26.8–34.3)
MCHC RBC AUTO-ENTMCNC: 33 G/DL (ref 31.4–37.4)
MCV RBC AUTO: 85 FL (ref 82–98)
MONOCYTES # BLD AUTO: 0.75 THOUSAND/ÂΜL (ref 0.17–1.22)
MONOCYTES NFR BLD AUTO: 7 % (ref 4–12)
NEUTROPHILS # BLD AUTO: 8.3 THOUSANDS/ÂΜL (ref 1.85–7.62)
NEUTS SEG NFR BLD AUTO: 76 % (ref 43–75)
NRBC BLD AUTO-RTO: 0 /100 WBCS
PLATELET # BLD AUTO: 219 THOUSANDS/UL (ref 149–390)
PMV BLD AUTO: 11.7 FL (ref 8.9–12.7)
POTASSIUM SERPL-SCNC: 4 MMOL/L (ref 3.5–5.3)
PROT SERPL-MCNC: 7.8 G/DL (ref 6.4–8.4)
PROTHROMBIN TIME: 13.1 SECONDS (ref 11.6–14.5)
RBC # BLD AUTO: 5.63 MILLION/UL (ref 3.88–5.62)
SODIUM SERPL-SCNC: 137 MMOL/L (ref 135–147)
WBC # BLD AUTO: 11.01 THOUSAND/UL (ref 4.31–10.16)

## 2023-05-13 RX ORDER — OXYCODONE HYDROCHLORIDE 5 MG/1
5 TABLET ORAL EVERY 4 HOURS PRN
Status: DISCONTINUED | OUTPATIENT
Start: 2023-05-13 | End: 2023-05-18 | Stop reason: HOSPADM

## 2023-05-13 RX ORDER — CEFTRIAXONE 1 G/50ML
1000 INJECTION, SOLUTION INTRAVENOUS ONCE
Status: COMPLETED | OUTPATIENT
Start: 2023-05-13 | End: 2023-05-13

## 2023-05-13 RX ORDER — LISINOPRIL 10 MG/1
10 TABLET ORAL DAILY
Status: DISCONTINUED | OUTPATIENT
Start: 2023-05-13 | End: 2023-05-17

## 2023-05-13 RX ORDER — HYDRALAZINE HYDROCHLORIDE 20 MG/ML
5 INJECTION INTRAMUSCULAR; INTRAVENOUS EVERY 6 HOURS PRN
Status: DISCONTINUED | OUTPATIENT
Start: 2023-05-13 | End: 2023-05-18 | Stop reason: HOSPADM

## 2023-05-13 RX ORDER — ACETAMINOPHEN 325 MG/1
975 TABLET ORAL EVERY 8 HOURS SCHEDULED
Status: DISCONTINUED | OUTPATIENT
Start: 2023-05-13 | End: 2023-05-18 | Stop reason: HOSPADM

## 2023-05-13 RX ORDER — ENOXAPARIN SODIUM 100 MG/ML
60 INJECTION SUBCUTANEOUS 2 TIMES DAILY
Status: DISCONTINUED | OUTPATIENT
Start: 2023-05-13 | End: 2023-05-18 | Stop reason: HOSPADM

## 2023-05-13 RX ORDER — HYDROCHLOROTHIAZIDE 25 MG/1
25 TABLET ORAL DAILY
Status: DISCONTINUED | OUTPATIENT
Start: 2023-05-13 | End: 2023-05-16

## 2023-05-13 RX ORDER — CEFAZOLIN SODIUM 2 G/50ML
2000 SOLUTION INTRAVENOUS EVERY 8 HOURS
Status: DISCONTINUED | OUTPATIENT
Start: 2023-05-13 | End: 2023-05-16

## 2023-05-13 RX ADMIN — ACETAMINOPHEN 975 MG: 325 TABLET ORAL at 21:40

## 2023-05-13 RX ADMIN — OXYCODONE HYDROCHLORIDE 5 MG: 5 TABLET ORAL at 21:40

## 2023-05-13 RX ADMIN — CEFTRIAXONE 1000 MG: 1 INJECTION, SOLUTION INTRAVENOUS at 11:32

## 2023-05-13 RX ADMIN — VANCOMYCIN HYDROCHLORIDE 1500 MG: 5 INJECTION, POWDER, LYOPHILIZED, FOR SOLUTION INTRAVENOUS at 12:25

## 2023-05-13 RX ADMIN — ACETAMINOPHEN 975 MG: 325 TABLET ORAL at 13:08

## 2023-05-13 RX ADMIN — MORPHINE SULFATE 2 MG: 2 INJECTION, SOLUTION INTRAMUSCULAR; INTRAVENOUS at 22:50

## 2023-05-13 RX ADMIN — CEFAZOLIN SODIUM 2000 MG: 2 SOLUTION INTRAVENOUS at 21:41

## 2023-05-13 RX ADMIN — OXYCODONE HYDROCHLORIDE 5 MG: 5 TABLET ORAL at 14:11

## 2023-05-13 RX ADMIN — CEFAZOLIN SODIUM 2000 MG: 2 SOLUTION INTRAVENOUS at 14:27

## 2023-05-13 RX ADMIN — SODIUM CHLORIDE 1000 ML: 0.9 INJECTION, SOLUTION INTRAVENOUS at 11:31

## 2023-05-13 RX ADMIN — ENOXAPARIN SODIUM 60 MG: 60 INJECTION SUBCUTANEOUS at 13:09

## 2023-05-13 NOTE — PLAN OF CARE
Problem: Potential for Falls  Goal: Patient will remain free of falls  Description: INTERVENTIONS:  - Educate patient/family on patient safety including physical limitations  - Instruct patient to call for assistance with activity   - Consult OT/PT to assist with strengthening/mobility   - Keep Call bell within reach  - Keep bed low and locked with side rails adjusted as appropriate  - Keep care items and personal belongings within reach  - Initiate and maintain comfort rounds  - Make Fall Risk Sign visible to staff  - Offer Toileting every 2 Hours, in advance of need  - Outcome: Progressing     Problem: PAIN - ADULT  Goal: Verbalizes/displays adequate comfort level or baseline comfort level  Description: Interventions:  - Encourage patient to monitor pain and request assistance  - Assess pain using appropriate pain scale  - Administer analgesics based on type and severity of pain and evaluate response  - Implement non-pharmacological measures as appropriate and evaluate response  - Consider cultural and social influences on pain and pain management  - Notify physician/advanced practitioner if interventions unsuccessful or patient reports new pain  Outcome: Progressing     Problem: INFECTION - ADULT  Goal: Absence or prevention of progression during hospitalization  Description: INTERVENTIONS:  - Assess and monitor for signs and symptoms of infection  - Monitor lab/diagnostic results  - Monitor all insertion sites, i e  indwelling lines, tubes, and drains  - Monitor endotracheal if appropriate and nasal secretions for changes in amount and color  - [place] appropriate cooling/warming therapies per order  - Administer medications as ordered  - Instruct and encourage patient and family to use good hand hygiene technique  - Identify and instruct in appropriate isolation precautions for identified infection/condition  Outcome: Progressing  Goal: Absence of fever/infection during neutropenic period  Description: INTERVENTIONS:  - Monitor WBC    Outcome: Progressing     Problem: SAFETY ADULT  Goal: Patient will remain free of falls  Description: INTERVENTIONS:  - Educate patient/family on patient safety including physical limitations  - Instruct patient to call for assistance with activity   - Consult OT/PT to assist with strengthening/mobility   - Keep Call bell within reach  - Keep bed low and locked with side rails adjusted as appropriate  - Keep care items and personal belongings within reach  - Initiate and maintain comfort rounds  - Make Fall Risk Sign visible to staff  - Offer Toileting every 2 Hours, in advance of need  -Outcome: Progressing  Goal: Maintain or return to baseline ADL function  Description: INTERVENTIONS:  -  Assess patient's ability to carry out ADLs; assess patient's baseline for ADL function and identify physical deficits which impact ability to perform ADLs (bathing, care of mouth/teeth, toileting, grooming, dressing, etc )  - Assess/evaluate cause of self-care deficits   - Assess range of motion  - Assess patient's mobility; develop plan if impaired  - Assess patient's need for assistive devices and provide as appropriate  - Encourage maximum independence but intervene and supervise when necessary  - Involve family in performance of ADLs  - Assess for home care needs following discharge   - Consider OT consult to assist with ADL evaluation and planning for discharge  - Provide patient education as appropriate  Outcome: Progressing  Goal: Maintains/Returns to pre admission functional level  Description: INTERVENTIONS:  - Perform BMAT or MOVE assessment daily    - Set and communicate daily mobility goal to care team and patient/family/caregiver     - Collaborate with rehabilitation services on mobility goals if consulted  - Stand patient 3 times a day  - Ambulate patient 3 times a day  - Out of bed to chair 3 times a day   - Out of bed for meals 3 times a day  - Out of bed for toileting  - Record patient progress and toleration of activity level   Outcome: Progressing     Problem: DISCHARGE PLANNING  Goal: Discharge to home or other facility with appropriate resources  Description: INTERVENTIONS:  - Identify barriers to discharge w/patient and caregiver  - Arrange for needed discharge resources and transportation as appropriate  - Identify discharge learning needs (meds, wound care, etc )  - Arrange for interpretive services to assist at discharge as needed  - Refer to Case Management Department for coordinating discharge planning if the patient needs post-hospital services based on physician/advanced practitioner order or complex needs related to functional status, cognitive ability, or social support system  Outcome: Progressing     Problem: Knowledge Deficit  Goal: Patient/family/caregiver demonstrates understanding of disease process, treatment plan, medications, and discharge instructions  Description: Complete learning assessment and assess knowledge base    Interventions:  - Provide teaching at level of understanding  - Provide teaching via preferred learning methods  Outcome: Progressing

## 2023-05-13 NOTE — H&P
[address]  H&P  Name: Leatha Dupont 39 y o  male I MRN: [id number]  Unit/Bed#: [facility] I Date of Admission: 5/13/2023   Date of Service: 5/13/2023 I Hospital Day: 0      Assessment/Plan   * Cellulitis of right lower extremity  Assessment & Plan  Patient presents with increased erythema, swelling and pain in the right lower extremity  Saw his PCP 2 days ago and was started on Keflex but continued to have fever and worsening redness and swelling despite being on antibiotics for 2 days  Venous Doppler ultrasound negative for DVT  Denies any trauma  No prior history of MRSA  Received ceftriaxone and vancomycin in the emergency room  We will treat with high-dose IV cefazolin  Continue with serial exams/neurovascular checks  Follow-up on blood cultures      Leukocytosis  Assessment & Plan  Present admission secondary to right lower extremity cellulitis  Does not meet any SIRS criteria  Continue to trend CBC and temperature curve closely  Continue with current antibiotic    Morbid obesity ([address] )  Assessment & Plan  BMI 53 1  Nutrition therapeutic lifestyle modification recommended  Essential hypertension  Assessment & Plan  Maintained on lisinopril and hydrochlorothiazide  Elevated blood pressure admission likely secondary to pain  Took both of his oral and hypertensive medications today  Will resume  Elevated bilirubin  Assessment & Plan  History of fatty liver  Elevated bilirubin which is chronic  Outpatient follow-up  Recommended dietary modification and weight loss  VTE Pharmacologic Prophylaxis:   High Risk (Score >/= 5) - Pharmacological DVT Prophylaxis Ordered: enoxaparin (Lovenox)  Sequential Compression Devices Ordered  Code Status: Full Code   Discussion with family: Patient declined call to        Anticipated Length of Stay: Patient will be admitted on an inpatient basis with an anticipated length of stay of greater than 2 midnights secondary to Ongoing IV antibiotics  Total Time Spent on Date of Encounter in care of patient: 75 minutes This time was spent on one or more of the following: performing physical exam; counseling and coordination of care; obtaining or reviewing history; documenting in the medical record; reviewing/ordering tests, medications or procedures; communicating with other healthcare professionals and discussing with patient's family/caregivers  Chief Complaint: Right lower extremity swelling    History of Present Illness:  Randy Ahn is a 39 y o  male with a PMH of hypertension, morbid obesity who presents with 2 days history of right lower extremity swelling and redness  He went to see his primary care physician 2 days ago and was started on Keflex but continued to have worsening redness swelling and fever of 101 today thereby prompting him to come to the emergency room  Denies any trauma to the right leg  Denies any recent hospitalization or injury  Denies any prior history of blood clot in the legs  Does have history of chronic lower extremity edema likely venous insufficiency  Last treatment for cellulitis was 1 year ago  Review of Systems:  Review of Systems   Constitutional: Positive for chills and fever  HENT: Negative  Eyes: Negative  Respiratory: Negative  Cardiovascular: Positive for leg swelling  Gastrointestinal: Negative  Endocrine: Negative  Genitourinary: Negative  Musculoskeletal: Positive for gait problem  Skin: Positive for color change and rash  Allergic/Immunologic: Negative  Hematological: Negative  Psychiatric/Behavioral: Negative  Past Medical and Surgical History:   Past Medical History:   Diagnosis Date   • Hypertension        History reviewed  No pertinent surgical history  Meds/Allergies:  Prior to Admission medications    Medication Sig Start Date End Date Taking?  Authorizing Provider   cephalexin (KEFLEX) 500 mg capsule Take 1 capsule (500 mg total) "by mouth every 8 (eight) hours for 14 days 5/11/23 5/25/23 Yes KELLI Palacios   hydrochlorothiazide (HYDRODIURIL) 25 mg tablet Take 1 tablet (25 mg total) by mouth daily 5/11/23  Yes KELLI Palacios   lisinopril (ZESTRIL) 10 mg tablet Take 1 tablet (10 mg total) by mouth daily 5/11/23  Yes KELLI Palacios   oxyCODONE (Roxicodone) 5 immediate release tablet Take 1 tablet (5 mg total) by mouth every 4 (four) hours as needed for moderate pain Max Daily Amount: 30 mg 5/11/23  Yes KELLI Titus     I have reviewed home medications with patient personally  Allergies: No Known Allergies    Social History:  Marital Status: Single   Occupation:   Substance Use History:   Social History     Substance and Sexual Activity   Alcohol Use Not Currently     Social History     Tobacco Use   Smoking Status Never   Smokeless Tobacco Never     Social History     Substance and Sexual Activity   Drug Use Not Currently       Family History:  Family History   Problem Relation Age of Onset   • Diabetes Mother    • Hypertension Father    • Kidney disease Father        Physical Exam:     Vitals:   Blood Pressure: 144/74 (05/13/23 1216)  Pulse: 84 (05/13/23 1216)  Temperature: 99 1 °F (37 3 °C) (05/13/23 1216)  Temp Source: Oral (05/13/23 1104)  Respirations: 16 (05/13/23 1216)  Height: 5' 9\" (175 3 cm) (05/13/23 1104)  Weight - Scale: (!) 163 kg (360 lb) (05/13/23 1104)  SpO2: 98 % (05/13/23 1216)    Physical Exam  Constitutional:       General: He is not in acute distress  Appearance: He is obese  HENT:      Head: Normocephalic  Right Ear: Tympanic membrane normal       Mouth/Throat:      Mouth: Mucous membranes are dry  Eyes:      Pupils: Pupils are equal, round, and reactive to light  Cardiovascular:      Rate and Rhythm: Normal rate and regular rhythm  Pulses: Normal pulses     Pulmonary:      Effort: Pulmonary effort is normal    Abdominal:      " General: Abdomen is flat  Musculoskeletal:      Cervical back: Neck supple  Right lower leg: Edema present  Comments: Extensive erythema, induration and swelling of the right lower extremity below the knee extending on the medial aspect of the thigh  Intact dorsalis pedis and posterior tibial pulses  No area of fluctuance appreciated   Skin:     Findings: Erythema and rash present  Neurological:      General: No focal deficit present  Mental Status: He is alert and oriented to person, place, and time  Mental status is at baseline  Psychiatric:         Mood and Affect: Mood normal                  Additional Data:     Lab Results:  Results from last 7 days   Lab Units 05/13/23  1123   WBC Thousand/uL 11 01*   HEMOGLOBIN g/dL 15 8   HEMATOCRIT % 47 9   PLATELETS Thousands/uL 219   NEUTROS PCT % 76*   LYMPHS PCT % 17   MONOS PCT % 7   EOS PCT % 0     Results from last 7 days   Lab Units 05/13/23  1123   SODIUM mmol/L 137   POTASSIUM mmol/L 4 0   CHLORIDE mmol/L 99   CO2 mmol/L 30   BUN mg/dL 11   CREATININE mg/dL 0 93   ANION GAP mmol/L 8   CALCIUM mg/dL 9 5   ALBUMIN g/dL 3 9   TOTAL BILIRUBIN mg/dL 1 53*   ALK PHOS U/L 114*   ALT U/L 24   AST U/L 23   GLUCOSE RANDOM mg/dL 114     Results from last 7 days   Lab Units 05/13/23  1123   INR  0 98             Results from last 7 days   Lab Units 05/13/23  1123   LACTIC ACID mmol/L 0 8       Lines/Drains:  Invasive Devices     Peripheral Intravenous Line  Duration           Peripheral IV 05/13/23 Dorsal (posterior); Left Forearm <1 day                    Imaging: Reviewed radiology reports from this admission including: ultrasound(s)  VAS lower limb venous duplex study, unilateral/limited    (Results Pending)       EKG and Other Studies Reviewed on Admission:   · EKG: No EKG obtained  ** Please Note: This note has been constructed using a voice recognition system   **

## 2023-05-13 NOTE — ASSESSMENT & PLAN NOTE
Present admission secondary to right lower extremity cellulitis  Does not meet any SIRS criteria  Continue to trend CBC and temperature curve closely    Continue with current antibiotic

## 2023-05-13 NOTE — ED PROVIDER NOTES
History  Chief Complaint   Patient presents with   • Cellulitis     Right leg cellulitis   • Fever - 9 weeks to 74 years     Over last day     Patient with a past history of right leg cellulitis  Started with redness and swelling to the leg 4 days ago  Started on Keflex 2 days ago  Getting worse  Having fevers and chills  Having shakes  Fever of 101 prior to arrival   Took Tylenol 90 minutes prior to arrival   No vomiting or diarrhea  Does have intermittent nausea although not now  Not diabetic  History provided by:  Patient   used: No    Fever - 9 weeks to 74 years  Max temp prior to arrival:  101  Onset quality:  Gradual  Duration:  3 days  Timing:  Constant  Progression:  Worsening  Chronicity:  New  Relieved by:  Nothing  Worsened by:  Nothing  Ineffective treatments: Keflex  Associated symptoms: chills    Associated symptoms: no chest pain, no cough, no diarrhea, no dysuria, no ear pain, no headaches, no nausea, no rash, no somnolence, no sore throat and no vomiting        Prior to Admission Medications   Prescriptions Last Dose Informant Patient Reported? Taking? cephalexin (KEFLEX) 500 mg capsule 5/13/2023  No Yes   Sig: Take 1 capsule (500 mg total) by mouth every 8 (eight) hours for 14 days   hydrochlorothiazide (HYDRODIURIL) 25 mg tablet 5/13/2023  No Yes   Sig: Take 1 tablet (25 mg total) by mouth daily   lisinopril (ZESTRIL) 10 mg tablet 5/13/2023  No Yes   Sig: Take 1 tablet (10 mg total) by mouth daily   oxyCODONE (Roxicodone) 5 immediate release tablet 5/13/2023  No Yes   Sig: Take 1 tablet (5 mg total) by mouth every 4 (four) hours as needed for moderate pain Max Daily Amount: 30 mg      Facility-Administered Medications: None       Past Medical History:   Diagnosis Date   • Hypertension        History reviewed  No pertinent surgical history      Family History   Problem Relation Age of Onset   • Diabetes Mother    • Hypertension Father    • Kidney disease Father I have reviewed and agree with the history as documented  E-Cigarette/Vaping   • E-Cigarette Use Never User      E-Cigarette/Vaping Substances     Social History     Tobacco Use   • Smoking status: Never   • Smokeless tobacco: Never   Vaping Use   • Vaping Use: Never used   Substance Use Topics   • Alcohol use: Not Currently   • Drug use: Not Currently       Review of Systems   Constitutional: Positive for chills and fever  HENT: Negative for ear pain, hearing loss, sore throat, trouble swallowing and voice change  Eyes: Negative for pain and discharge  Respiratory: Negative for cough, shortness of breath and wheezing  Cardiovascular: Negative for chest pain and palpitations  Gastrointestinal: Negative for abdominal pain, blood in stool, constipation, diarrhea, nausea and vomiting  Genitourinary: Negative for dysuria, flank pain, frequency and hematuria  Musculoskeletal: Positive for joint swelling  Negative for neck pain and neck stiffness  Skin: Negative for rash and wound  Neurological: Negative for dizziness, seizures, syncope, facial asymmetry and headaches  Psychiatric/Behavioral: Negative for hallucinations, self-injury and suicidal ideas  All other systems reviewed and are negative  Physical Exam  Physical Exam  Vitals and nursing note reviewed  Constitutional:       General: He is not in acute distress  Appearance: He is well-developed  HENT:      Head: Normocephalic and atraumatic  Right Ear: External ear normal       Left Ear: External ear normal    Eyes:      General: No scleral icterus  Right eye: No discharge  Left eye: No discharge  Extraocular Movements: Extraocular movements intact  Conjunctiva/sclera: Conjunctivae normal    Cardiovascular:      Rate and Rhythm: Normal rate and regular rhythm  Heart sounds: Normal heart sounds  No murmur heard    Pulmonary:      Effort: Pulmonary effort is normal       Breath sounds: Normal breath sounds  No wheezing or rales  Abdominal:      General: Bowel sounds are normal  There is no distension  Palpations: Abdomen is soft  Tenderness: There is no abdominal tenderness  There is no guarding or rebound  Musculoskeletal:         General: Swelling and tenderness present  No deformity  Normal range of motion  Cervical back: Normal range of motion and neck supple  Comments: There is erythema and warmth in the right lower leg starting at the ankle up to the knee  There is slight erythema along the medial aspect of the distal upper inner thigh  There is no area of fluctuance or drainage  Skin:     General: Skin is warm and dry  Findings: No rash  Neurological:      General: No focal deficit present  Mental Status: He is alert and oriented to person, place, and time  Cranial Nerves: No cranial nerve deficit  Psychiatric:         Mood and Affect: Mood normal          Behavior: Behavior normal          Thought Content:  Thought content normal          Judgment: Judgment normal          Vital Signs  ED Triage Vitals [05/13/23 1104]   Temperature Pulse Respirations Blood Pressure SpO2   99 2 °F (37 3 °C) 98 18 (!) 161/103 97 %      Temp Source Heart Rate Source Patient Position - Orthostatic VS BP Location FiO2 (%)   Oral Monitor -- Left arm --      Pain Score       7           Vitals:    05/13/23 1104 05/13/23 1145   BP: (!) 161/103 158/84   Pulse: 98 88         Visual Acuity      ED Medications  Medications   sodium chloride 0 9 % bolus 1,000 mL (1,000 mL Intravenous New Bag 5/13/23 1131)   cefTRIAXone (ROCEPHIN) IVPB (premix in dextrose) 1,000 mg 50 mL (1,000 mg Intravenous New Bag 5/13/23 1132)   vancomycin (VANCOCIN) 1500 mg in sodium chloride 0 9% 250 mL IVPB (has no administration in time range)       Diagnostic Studies  Results Reviewed     Procedure Component Value Units Date/Time    Comprehensive metabolic panel [[id number]]  (Abnormal) Collected: 05/13/23 1123    Lab Status: Final result Specimen: Blood from Arm, Left Updated: 05/13/23 1153     Sodium 137 mmol/L      Potassium 4 0 mmol/L      Chloride 99 mmol/L      CO2 30 mmol/L      ANION GAP 8 mmol/L      BUN 11 mg/dL      Creatinine 0 93 mg/dL      Glucose 114 mg/dL      Calcium 9 5 mg/dL      AST 23 U/L      ALT 24 U/L      Alkaline Phosphatase 114 U/L      Total Protein 7 8 g/dL      Albumin 3 9 g/dL      Total Bilirubin 1 53 mg/dL      eGFR 99 ml/min/1 73sq m     Narrative:      [place] guidelines for Chronic Kidney Disease (CKD):   •  Stage 1 with normal or high GFR (GFR > 90 mL/min/1 73 square meters)  •  Stage 2 Mild CKD (GFR = 60-89 mL/min/1 73 square meters)  •  Stage 3A Moderate CKD (GFR = 45-59 mL/min/1 73 square meters)  •  Stage 3B Moderate CKD (GFR = 30-44 mL/min/1 73 square meters)  •  Stage 4 Severe CKD (GFR = 15-29 mL/min/1 73 square meters)  •  Stage 5 End Stage CKD (GFR <15 mL/min/1 73 square meters)  Note: GFR calculation is accurate only with a steady state creatinine    Lipase [[id number]]  (Normal) Collected: 05/13/23 1123    Lab Status: Final result Specimen: Blood from Arm, Left Updated: 05/13/23 1153     Lipase 40 u/L     Lactic acid, plasma (w/reflex if result > 2 0) [[id number]]  (Normal) Collected: 05/13/23 1123    Lab Status: Final result Specimen: Blood from Arm, Left Updated: 05/13/23 1153     LACTIC ACID 0 8 mmol/L     Narrative:      Result may be elevated if tourniquet was used during collection      Protime-INR [[id number]]  (Normal) Collected: 05/13/23 1123    Lab Status: Final result Specimen: Blood from Arm, Left Updated: 05/13/23 1149     Protime 13 1 seconds      INR 0 98    APTT [[id number]]  (Normal) Collected: 05/13/23 1123    Lab Status: Final result Specimen: Blood from Arm, Left Updated: 05/13/23 1149     PTT 36 seconds     CBC and differential [[id number]]  (Abnormal) Collected: 05/13/23 1123    Lab Status: Final result Specimen: Blood from Arm, Left Updated: 05/13/23 1135     WBC 11 01 Thousand/uL      RBC 5 63 Million/uL      Hemoglobin 15 8 g/dL      Hematocrit 47 9 %      MCV 85 fL      MCH 28 1 pg      MCHC 33 0 g/dL      RDW 12 8 %      MPV 11 7 fL      Platelets 449 Thousands/uL      nRBC 0 /100 WBCs      Neutrophils Relative 76 %      Immat GRANS % 0 %      Lymphocytes Relative 17 %      Monocytes Relative 7 %      Eosinophils Relative 0 %      Basophils Relative 0 %      Neutrophils Absolute 8 30 Thousands/µL      Immature Grans Absolute 0 04 Thousand/uL      Lymphocytes Absolute 1 85 Thousands/µL      Monocytes Absolute 0 75 Thousand/µL      Eosinophils Absolute 0 04 Thousand/µL      Basophils Absolute 0 03 Thousands/µL     Blood culture #1 [[id number]] Collected: 05/13/23 1123    Lab Status: In process Specimen: Blood from Arm, Left Updated: 05/13/23 1131    Blood culture #2 [[id number]] Collected: 05/13/23 1125    Lab Status: In process Specimen: Blood from Arm, Right Updated: 05/13/23 1131                 VAS lower limb venous duplex study, unilateral/limited    (Results Pending)              Procedures  Procedures         ED Course  ED Course as of 05/13/23 1159   Sat May 13, 2023   1150 No DVT per vascular technician  1154 Patient having fevers and chills  Symptoms started on Wednesday  Despite 2 days of antibiotics his symptoms are getting worse and still having fevers up to 101 today  Patient failed outpatient treatment  Will need to be admitted for IV antibiotics  SBIRT 22yo+    Flowsheet Row Most Recent Value   Initial Alcohol Screen: US AUDIT-C     1  How often do you have a drink containing alcohol? 0 Filed at: 05/13/2023 1127   2  How many drinks containing alcohol do you have on a typical day you are drinking? 0 Filed at: 05/13/2023 1127   3a  Male UNDER 65: How often do you have five or more drinks on one occasion? 0 Filed at: 05/13/2023 1127   3b  FEMALE Any Age, or MALE 65+:  How often do you have 4 or more drinks on one occassion? 0 Filed at: 05/13/2023 1127   Audit-C Score 0 Filed at: 05/13/2023 1127   MANOJ: How many times in the past year have you    Used an illegal drug or used a prescription medication for non-medical reasons? Never Filed at: 05/13/2023 1127                    Medical Decision Making  Amount and/or Complexity of Data Reviewed  Labs: ordered  Decision-making details documented in ED Course  Discussion of management or test interpretation with external provider(s): We will check blood work to check the patient's white blood cell count  Having fevers and chills at home despite being on antibiotics for 2 days  We will also get a duplex of the leg to make sure there is no DVT  I do not feel any signs or indication that he has an abscess in the leg  Risk  OTC drugs  Prescription drug management  Decision regarding hospitalization  Disposition  Final diagnoses:   Cellulitis of right leg - Failed outpatient treatment  Time reflects when diagnosis was documented in both MDM as applicable and the Disposition within this note     Time User Action Codes Description Comment    5/13/2023 11:57 AM Carina Gupta Add [R57 645] Cellulitis of right leg     5/13/2023 11:57 AM Carina Gupta Modify [L03 115] Cellulitis of right leg Failed outpatient treatment  ED Disposition     ED Disposition   Admit    Condition   Stable    Date/Time   Sat May 13, 2023 11:59 AM    Comment   Case was discussed with Dr Arabella Perez and the patient's admission status was agreed to be  to the service of Dr Hernesto Craven  Follow-up Information    None         Patient's Medications   Discharge Prescriptions    No medications on file       No discharge procedures on file      PDMP Review       Value Time User    PDMP Reviewed  Yes 5/11/2023  3:00 PM Mickie Cole          ED Provider  Electronically Signed by           Farzana Canchola MD  05/13/23 6195

## 2023-05-13 NOTE — ASSESSMENT & PLAN NOTE
Patient presents with increased erythema, swelling and pain in the right lower extremity  Saw his PCP 2 days ago and was started on Keflex but continued to have fever and worsening redness and swelling despite being on antibiotics for 2 days    Venous Doppler ultrasound negative for DVT  Denies any trauma  No prior history of MRSA  Received ceftriaxone and vancomycin in the emergency room  We will treat with high-dose IV cefazolin  Continue with serial exams/neurovascular checks  Follow-up on blood cultures

## 2023-05-13 NOTE — ASSESSMENT & PLAN NOTE
Maintained on lisinopril and hydrochlorothiazide  Elevated blood pressure admission likely secondary to pain  Took both of his oral and hypertensive medications today  Will resume

## 2023-05-13 NOTE — ASSESSMENT & PLAN NOTE
History of fatty liver  Elevated bilirubin which is chronic  Outpatient follow-up  Recommended dietary modification and weight loss

## 2023-05-14 LAB
ANION GAP SERPL CALCULATED.3IONS-SCNC: 6 MMOL/L (ref 4–13)
BASOPHILS # BLD AUTO: 0.03 THOUSANDS/ÂΜL (ref 0–0.1)
BASOPHILS NFR BLD AUTO: 0 % (ref 0–1)
BUN SERPL-MCNC: 11 MG/DL (ref 5–25)
CALCIUM SERPL-MCNC: 8.9 MG/DL (ref 8.4–10.2)
CHLORIDE SERPL-SCNC: 101 MMOL/L (ref 96–108)
CO2 SERPL-SCNC: 32 MMOL/L (ref 21–32)
CREAT SERPL-MCNC: 0.91 MG/DL (ref 0.6–1.3)
EOSINOPHIL # BLD AUTO: 0.25 THOUSAND/ÂΜL (ref 0–0.61)
EOSINOPHIL NFR BLD AUTO: 3 % (ref 0–6)
ERYTHROCYTE [DISTWIDTH] IN BLOOD BY AUTOMATED COUNT: 13 % (ref 11.6–15.1)
GFR SERPL CREATININE-BSD FRML MDRD: 102 ML/MIN/1.73SQ M
GLUCOSE SERPL-MCNC: 97 MG/DL (ref 65–140)
HCT VFR BLD AUTO: 44.1 % (ref 36.5–49.3)
HGB BLD-MCNC: 14.5 G/DL (ref 12–17)
IMM GRANULOCYTES # BLD AUTO: 0.03 THOUSAND/UL (ref 0–0.2)
IMM GRANULOCYTES NFR BLD AUTO: 0 % (ref 0–2)
LYMPHOCYTES # BLD AUTO: 2.16 THOUSANDS/ÂΜL (ref 0.6–4.47)
LYMPHOCYTES NFR BLD AUTO: 29 % (ref 14–44)
MCH RBC QN AUTO: 28.2 PG (ref 26.8–34.3)
MCHC RBC AUTO-ENTMCNC: 32.9 G/DL (ref 31.4–37.4)
MCV RBC AUTO: 86 FL (ref 82–98)
MONOCYTES # BLD AUTO: 0.74 THOUSAND/ÂΜL (ref 0.17–1.22)
MONOCYTES NFR BLD AUTO: 10 % (ref 4–12)
NEUTROPHILS # BLD AUTO: 4.3 THOUSANDS/ÂΜL (ref 1.85–7.62)
NEUTS SEG NFR BLD AUTO: 58 % (ref 43–75)
NRBC BLD AUTO-RTO: 0 /100 WBCS
PLATELET # BLD AUTO: 234 THOUSANDS/UL (ref 149–390)
PMV BLD AUTO: 11.4 FL (ref 8.9–12.7)
POTASSIUM SERPL-SCNC: 3.2 MMOL/L (ref 3.5–5.3)
RBC # BLD AUTO: 5.14 MILLION/UL (ref 3.88–5.62)
SODIUM SERPL-SCNC: 139 MMOL/L (ref 135–147)
WBC # BLD AUTO: 7.51 THOUSAND/UL (ref 4.31–10.16)

## 2023-05-14 RX ORDER — POTASSIUM CHLORIDE 20 MEQ/1
40 TABLET, EXTENDED RELEASE ORAL ONCE
Status: COMPLETED | OUTPATIENT
Start: 2023-05-14 | End: 2023-05-14

## 2023-05-14 RX ADMIN — CEFAZOLIN SODIUM 2000 MG: 2 SOLUTION INTRAVENOUS at 12:29

## 2023-05-14 RX ADMIN — CEFAZOLIN SODIUM 2000 MG: 2 SOLUTION INTRAVENOUS at 21:07

## 2023-05-14 RX ADMIN — ENOXAPARIN SODIUM 60 MG: 60 INJECTION SUBCUTANEOUS at 12:29

## 2023-05-14 RX ADMIN — MORPHINE SULFATE 2 MG: 2 INJECTION, SOLUTION INTRAMUSCULAR; INTRAVENOUS at 21:57

## 2023-05-14 RX ADMIN — HYDROCHLOROTHIAZIDE 25 MG: 25 TABLET ORAL at 07:55

## 2023-05-14 RX ADMIN — ACETAMINOPHEN 975 MG: 325 TABLET ORAL at 13:08

## 2023-05-14 RX ADMIN — ACETAMINOPHEN 975 MG: 325 TABLET ORAL at 21:07

## 2023-05-14 RX ADMIN — OXYCODONE HYDROCHLORIDE 5 MG: 5 TABLET ORAL at 19:16

## 2023-05-14 RX ADMIN — LISINOPRIL 10 MG: 10 TABLET ORAL at 07:55

## 2023-05-14 RX ADMIN — ENOXAPARIN SODIUM 60 MG: 60 INJECTION SUBCUTANEOUS at 01:38

## 2023-05-14 RX ADMIN — POTASSIUM CHLORIDE 40 MEQ: 1500 TABLET, EXTENDED RELEASE ORAL at 09:38

## 2023-05-14 RX ADMIN — OXYCODONE HYDROCHLORIDE 5 MG: 5 TABLET ORAL at 07:56

## 2023-05-14 RX ADMIN — ACETAMINOPHEN 975 MG: 325 TABLET ORAL at 05:09

## 2023-05-14 RX ADMIN — CEFAZOLIN SODIUM 2000 MG: 2 SOLUTION INTRAVENOUS at 05:09

## 2023-05-14 RX ADMIN — MORPHINE SULFATE 2 MG: 2 INJECTION, SOLUTION INTRAMUSCULAR; INTRAVENOUS at 09:41

## 2023-05-14 RX ADMIN — OXYCODONE HYDROCHLORIDE 5 MG: 5 TABLET ORAL at 01:41

## 2023-05-14 NOTE — UTILIZATION REVIEW
Initial Clinical Review    Admission: Date/Time/Statement:   Admission Orders (From admission, onward)     Ordered        05/13/23 1159  1 [facility] [place],[address]  Once                      Orders Placed This Encounter   Procedures   • INPATIENT ADMISSION     Standing Status:   Standing     Number of Occurrences:   1     Order Specific Question:   Level of Care     Answer:   Med Surg [16]     Order Specific Question:   Estimated length of stay     Answer:   More than 2 Midnights     Order Specific Question:   Certification     Answer:   I certify that inpatient services are medically necessary for this patient for a duration of greater than two midnights  See H&P and MD Progress Notes for additional information about the patient's course of treatment  ED Arrival Information     Expected   -    Arrival   5/13/2023 10:59    Acuity   Urgent            Means of arrival   Walk-In    Escorted by   Self    Service   Hospitalist    Admission type   Emergency            Arrival complaint   leg pain           Chief Complaint   Patient presents with   • Cellulitis     Right leg cellulitis   • Fever - 9 weeks to 74 years     Over last day       Initial Presentation: 39 y o  male to ED presents for right lower extremity swelling and redness x2 days  Seen by PCP 2 days ago and started on Keflex but continued with worsening redness, swelling and fever of 101 today  PMH for HTN, Morbid obesity and fatty liver      Admit Inpatient level of care for Cellulitis of RLE, Leukocytosis and Elevated bilirubin - chronic  Started on Iv antibiotic in ED and continue  Bld culture x2 Serial exams  Neurovascular checks q4h  F/u bld cultures  Date: 5/14  Day 2:   Progress notes; Hypokalemia  Continue Iv antibiotics  Redness and swelling improving  Leukocytosis improving  K 3 2 and replete  F/u BMP in am 5/15  Pain control      ED Triage Vitals [05/13/23 1104]   Temperature Pulse Respirations Blood Pressure SpO2   99 2 °F (37 3 °C) 98 18 (!) 161/103 97 %      Temp Source Heart Rate Source Patient Position - Orthostatic VS BP Location FiO2 (%)   Oral Monitor -- Left arm --      Pain Score       7          Wt Readings from Last 1 Encounters:   05/13/23 (!) 163 kg (360 lb)     Additional Vital Signs:   05/14/23 07:17:39 97 7 °F (36 5 °C) 76 16 121/81 94 97 % --   05/13/23 23:09:18 98 1 °F (36 7 °C) 80 20 133/88 103 95 % --   05/13/23 2140 -- -- -- -- -- -- None (Room air)   05/13/23 15:19:11 98 8 °F (37 1 °C) 85 18 142/94 110 96 % --   05/13/23 13:52:44 -- 98 -- 155/102   Abnormal  120 98 % --   05/13/23 12:33:27 98 4 °F (36 9 °C) 89 16 160/105   Abnormal  123 97 % --     Pertinent Labs/Diagnostic Test Results:   VAS lower limb venous duplex study, unilateral/limited (5/13) - RIGHT LOWER LIMB  No evidence of acute or chronic deep vein thrombosis   No evidence of superficial thrombophlebitis noted  Doppler evaluation shows a normal response to augmentation maneuvers  Popliteal, posterior tibial and anterior tibial arterial Doppler waveform's are  triphasic  LEFT LOWER LIMB LIMITED  Evaluation shows no evidence of thrombus in the common femoral vein  Doppler evaluation shows a normal response to augmentation maneuvers     Final Result by Julian Mcfarland MD (05/13 2103)            Results from last 7 days   Lab Units 05/14/23  0516 05/13/23  1123   WBC Thousand/uL 7 51 11 01*   HEMOGLOBIN g/dL 14 5 15 8   HEMATOCRIT % 44 1 47 9   PLATELETS Thousands/uL 234 219   NEUTROS ABS Thousands/µL 4 30 8 30*         Results from last 7 days   Lab Units 05/14/23  0516 05/13/23  1123   SODIUM mmol/L 139 137   POTASSIUM mmol/L 3 2* 4 0   CHLORIDE mmol/L 101 99   CO2 mmol/L 32 30   ANION GAP mmol/L 6 8   BUN mg/dL 11 11   CREATININE mg/dL 0 91 0 93   EGFR ml/min/1 73sq m 102 99   CALCIUM mg/dL 8 9 9 5     Results from last 7 days   Lab Units 05/13/23  1123   AST U/L 23   ALT U/L 24   ALK PHOS U/L 114*   TOTAL PROTEIN g/dL 7 8   ALBUMIN g/dL 3 9   TOTAL BILIRUBIN mg/dL 1 53*         Results from last 7 days   Lab Units 05/14/23  0516 05/13/23  1123   GLUCOSE RANDOM mg/dL 97 114       Results from last 7 days   Lab Units 05/13/23  1123   PROTIME seconds 13 1   INR  0 98   PTT seconds 36             Results from last 7 days   Lab Units 05/13/23  1123   LACTIC ACID mmol/L 0 8       Results from last 7 days   Lab Units 05/13/23  1123   LIPASE u/L 40       Results from last 7 days   Lab Units 05/13/23  1125 05/13/23  1123   BLOOD CULTURE  Received in Microbiology Lab  Culture in Progress  Received in Microbiology Lab  Culture in Progress  ED Treatment:   Medication Administration from 05/13/2023 1055 to 05/13/2023 1224       Date/Time Order Dose Route Action     05/13/2023 1131 EDT sodium chloride 0 9 % bolus 1,000 mL 1,000 mL Intravenous New Bag     05/13/2023 1132 EDT cefTRIAXone (ROCEPHIN) IVPB (premix in dextrose) 1,000 mg 50 mL 1,000 mg Intravenous New Bag        Past Medical History:   Diagnosis Date   • Hypertension      Present on Admission:  • Cellulitis of right lower extremity  • Essential hypertension  • Elevated bilirubin  • Morbid obesity (HCC)      Admitting Diagnosis: Cellulitis [L03 90]  Fever [R50 9]  Cellulitis of right leg [L03 115]  Age/Sex: 39 y o  male     Admission Orders:  Scheduled Medications:  acetaminophen, 975 mg, Oral, Q8H TRINI  cefazolin, 2,000 mg, Intravenous, Q8H  enoxaparin, 60 mg, Subcutaneous, BID  hydrochlorothiazide, 25 mg, Oral, Daily  lisinopril, 10 mg, Oral, Daily    potassium chloride (K-DUR,KLOR-CON) CR tablet 40 mEq  Dose: 40 mEq  Freq:  Once Route: PO  Start: 05/14/23 0815 End: 05/14/23 0938      Continuous IV Infusions: None     PRN Meds:  hydrALAZINE, 5 mg, Intravenous, Q6H PRN  morphine injection, 2 mg, Intravenous, Q6H PRN 5/13 x1, 5/14 x1  oxyCODONE, 5 mg, Oral, Q4H PRN 5/13 x2, 5/14 x2        None    Network Utilization Review Department  ATTENTION: Please call with any questions or concerns to [phone] and carefully listen to the prompts so that you are directed to the right person  All voicemails are confidential   Renetta Lyle all requests for admission clinical reviews, approved or denied determinations and any other requests to dedicated fax number below belonging to the campus where the patient is receiving treatment   List of dedicated fax numbers for the Facilities:  [address] DENIALS (Administrative/Medical Necessity) [phone]   [address] (Maternity/NICU/Pediatrics) [phone]   [address] [phone]   [facility]  [phone]   [address] [place] [address] [facility] [phone]   15[address] [facility] Renee Alonzo [facility] 134 815 [facility] [phone]

## 2023-05-14 NOTE — ASSESSMENT & PLAN NOTE
Patient presents with increased erythema, swelling and pain in the right lower extremity  Saw his PCP 2 days ago and was started on Keflex but continued to have fever and worsening redness and swelling despite being on antibiotics for 2 days  Venous Doppler ultrasound negative for DVT  Denies any trauma  No prior history of MRSA  Received ceftriaxone and vancomycin in the emergency room  We will treat with high-dose IV cefazolin  Continue with serial exams/neurovascular checks    Improved erythema and decreased swelling today  Follow-up on blood cultures

## 2023-05-14 NOTE — ASSESSMENT & PLAN NOTE
Patient was brought in via EMS for suicide attempt. Per EMS patient had cell phone charge cords around his neck. Per patient was roughly 15 mins. Patient states he feels like he has no control over his situations so wanted to control something.  Patient states he has tried to cut himself in the past. Present admission secondary to right lower extremity cellulitis  Does not meet any SIRS criteria  Continue to trend CBC and temperature curve closely    Leukocytosis is improving  Continue with current antibiotic

## 2023-05-14 NOTE — NURSING NOTE
Patient's wife noticed patient's R eye has a red spot, perhaps a broken vessel  Patient denies any vision problems or pain, no other new symptoms  Dr Jean Marie Duque is made aware, no new orders at this time

## 2023-05-14 NOTE — PROGRESS NOTES
[address]  Progress Note  Name: Bre Alarcon  MRN: [id number]  Unit/Bed#: -01 I Date of Admission: 5/13/2023   Date of Service: 5/14/2023 I Hospital Day: 1    Assessment/Plan   * Cellulitis of right lower extremity  Assessment & Plan  Patient presents with increased erythema, swelling and pain in the right lower extremity  Saw his PCP 2 days ago and was started on Keflex but continued to have fever and worsening redness and swelling despite being on antibiotics for 2 days  Venous Doppler ultrasound negative for DVT  Denies any trauma  No prior history of MRSA  Received ceftriaxone and vancomycin in the emergency room  We will treat with high-dose IV cefazolin  Continue with serial exams/neurovascular checks  Improved erythema and decreased swelling today  Follow-up on blood cultures      Leukocytosis  Assessment & Plan  Present admission secondary to right lower extremity cellulitis  Does not meet any SIRS criteria  Continue to trend CBC and temperature curve closely  Leukocytosis is improving  Continue with current antibiotic    Morbid obesity ([address] )  Assessment & Plan  BMI 53 1  Nutrition therapeutic lifestyle modification recommended  Essential hypertension  Assessment & Plan  Maintained on lisinopril and hydrochlorothiazide  Elevated blood pressure admission likely secondary to pain  Took both of his oral and hypertensive medications today  Will resume  Elevated bilirubin  Assessment & Plan  History of fatty liver  Elevated bilirubin which is chronic  Outpatient follow-up  Recommended dietary modification and weight loss  Hypokalemia  Assessment & Plan  Replace  Follow-up BMP in a m  VTE Pharmacologic Prophylaxis: VTE Score: 5 High Risk (Score >/= 5) - Pharmacological DVT Prophylaxis Ordered: enoxaparin (Lovenox)  Sequential Compression Devices Ordered      Patient Centered Rounds: I performed bedside rounds with nursing staff today   Discussions with Specialists or Other Care Team Provider: Discussed with nursing    Education and Discussions with Family / Patient: Patient declined call to   Total Time Spent on Date of Encounter in care of patient: 35 minutes This time was spent on one or more of the following: performing physical exam; counseling and coordination of care; obtaining or reviewing history; documenting in the medical record; reviewing/ordering tests, medications or procedures; communicating with other healthcare professionals and discussing with patient's family/caregivers  Current Length of Stay: 1 day(s)  Current Patient Status: Inpatient   Certification Statement: The patient will continue to require additional inpatient hospital stay due to Ongoing IV antibiotic  Discharge Plan: Anticipate discharge in 24-48 hrs to home  Code Status: Level 1 - Full Code    Subjective:   Seen and examined at bedside  Tmax 99 2  Reports improvement in swelling and redness in the right lower extremity but still complains of pain  No acute events overnight  Objective:     Vitals:   Temp (24hrs), Av 6 °F (37 °C), Min:97 7 °F (36 5 °C), Max:99 2 °F (37 3 °C)    Temp:  [97 7 °F (36 5 °C)-99 2 °F (37 3 °C)] 97 7 °F (36 5 °C)  HR:  [76-98] 76  Resp:  [16-20] 16  BP: (121-161)/() 121/81  SpO2:  [95 %-98 %] 97 %  Body mass index is 53 16 kg/m²  Input and Output Summary (last 24 hours): Intake/Output Summary (Last 24 hours) at 2023 1053  Last data filed at 2023 1800  Gross per 24 hour   Intake 710 ml   Output --   Net 710 ml       Physical Exam:   Physical Exam  Constitutional:       General: He is not in acute distress  Appearance: He is obese  HENT:      Head: Normocephalic  Nose: Nose normal       Mouth/Throat:      Mouth: Mucous membranes are moist    Eyes:      Extraocular Movements: Extraocular movements intact  Pupils: Pupils are equal, round, and reactive to light  Cardiovascular:      Rate and Rhythm: Normal rate and regular rhythm  Pulses: Normal pulses  Heart sounds: No murmur heard  Pulmonary:      Effort: Pulmonary effort is normal    Abdominal:      General: Abdomen is flat  Bowel sounds are normal  There is no distension  Palpations: Abdomen is soft  Tenderness: There is no abdominal tenderness  There is no guarding  Musculoskeletal:      Cervical back: Neck supple  Comments: Decreased swelling but still with significant erythema and induration of the right lower extremity  Skin:     General: Skin is warm  Neurological:      General: No focal deficit present  Mental Status: He is alert and oriented to person, place, and time  Mental status is at baseline  Additional Data:     Labs:  Results from last 7 days   Lab Units 05/14/23  0516   WBC Thousand/uL 7 51   HEMOGLOBIN g/dL 14 5   HEMATOCRIT % 44 1   PLATELETS Thousands/uL 234   NEUTROS PCT % 58   LYMPHS PCT % 29   MONOS PCT % 10   EOS PCT % 3     Results from last 7 days   Lab Units 05/14/23  0516 05/13/23  1123   SODIUM mmol/L 139 137   POTASSIUM mmol/L 3 2* 4 0   CHLORIDE mmol/L 101 99   CO2 mmol/L 32 30   BUN mg/dL 11 11   CREATININE mg/dL 0 91 0 93   ANION GAP mmol/L 6 8   CALCIUM mg/dL 8 9 9 5   ALBUMIN g/dL  --  3 9   TOTAL BILIRUBIN mg/dL  --  1 53*   ALK PHOS U/L  --  114*   ALT U/L  --  24   AST U/L  --  23   GLUCOSE RANDOM mg/dL 97 114     Results from last 7 days   Lab Units 05/13/23  1123   INR  0 98             Results from last 7 days   Lab Units 05/13/23  1123   LACTIC ACID mmol/L 0 8       Lines/Drains:  Invasive Devices     Peripheral Intravenous Line  Duration           Peripheral IV 05/13/23 Dorsal (posterior); Left Forearm <1 day                      Imaging: No pertinent imaging reviewed  Recent Cultures (last 7 days):   Results from last 7 days   Lab Units 05/13/23  1125 05/13/23  1123   BLOOD CULTURE  Received in Microbiology Lab   Culture in Progress  Received in Microbiology Lab  Culture in Progress  Last 24 Hours Medication List:   Current Facility-Administered Medications   Medication Dose Route Frequency Provider Last Rate   • acetaminophen  975 mg Oral [facility] Dionne Sloan MD     • cefazolin  2,000 mg Intravenous Kimber Lamas MD 2,000 mg (05/14/23 7742)   • enoxaparin  60 mg Subcutaneous BID Dionne Sloan MD     • hydrALAZINE  5 mg Intravenous Q6H PRN Dionne Sloan MD     • hydrochlorothiazide  25 mg Oral Daily Dionne Sloan MD     • lisinopril  10 mg Oral Daily Dionne Sloan MD     • morphine injection  2 mg Intravenous Q6H PRN Dionne Sloan MD     • oxyCODONE  5 mg Oral Q4H PRN Dionne Sloan MD          Today, Patient Was Seen By: Dionne Sloan MD    **Please Note: This note may have been constructed using a voice recognition system  **

## 2023-05-14 NOTE — PLAN OF CARE
Problem: Potential for Falls  Goal: Patient will remain free of falls  Description: INTERVENTIONS:  - Educate patient/family on patient safety including physical limitations  - Instruct patient to call for assistance with activity   - Consult OT/PT to assist with strengthening/mobility   - Keep Call bell within reach  - Keep bed low and locked with side rails adjusted as appropriate  - Keep care items and personal belongings within reach  - Initiate and maintain comfort rounds  - Make Fall Risk Sign visible to staff  - Offer Toileting every 2 Hours, in advance of need  - Apply yellow socks and bracelet for high fall risk patients  - Consider moving patient to room near nurses station  Outcome: Progressing     Problem: PAIN - ADULT  Goal: Verbalizes/displays adequate comfort level or baseline comfort level  Description: Interventions:  - Encourage patient to monitor pain and request assistance  - Assess pain using appropriate pain scale  - Administer analgesics based on type and severity of pain and evaluate response  - Implement non-pharmacological measures as appropriate and evaluate response  - Consider cultural and social influences on pain and pain management  - Notify physician/advanced practitioner if interventions unsuccessful or patient reports new pain  Outcome: Progressing     Problem: INFECTION - ADULT  Goal: Absence or prevention of progression during hospitalization  Description: INTERVENTIONS:  - Assess and monitor for signs and symptoms of infection  - Monitor lab/diagnostic results  - Monitor all insertion sites, i e  indwelling lines, tubes, and drains  - Monitor endotracheal if appropriate and nasal secretions for changes in amount and color  - Elmer appropriate cooling/warming therapies per order  - Administer medications as ordered  - Instruct and encourage patient and family to use good hand hygiene technique  - Identify and instruct in appropriate isolation precautions for identified infection/condition  Outcome: Progressing  Goal: Absence of fever/infection during neutropenic period  Description: INTERVENTIONS:  - Monitor WBC    Outcome: Progressing    Goal: Maintain or return to baseline ADL function  Description: INTERVENTIONS:  -  Assess patient's ability to carry out ADLs; assess patient's baseline for ADL function and identify physical deficits which impact ability to perform ADLs (bathing, care of mouth/teeth, toileting, grooming, dressing, etc )  - Assess/evaluate cause of self-care deficits   - Assess range of motion  - Assess patient's mobility; develop plan if impaired  - Assess patient's need for assistive devices and provide as appropriate  - Encourage maximum independence but intervene and supervise when necessary  - Involve family in performance of ADLs  - Assess for home care needs following discharge   - Consider OT consult to assist with ADL evaluation and planning for discharge  - Provide patient education as appropriate  Outcome: Progressing  Goal: Maintains/Returns to pre admission functional level  Description: INTERVENTIONS:  - Perform BMAT or MOVE assessment daily    - Set and communicate daily mobility goal to care team and patient/family/caregiver  - Collaborate with rehabilitation services on mobility goals if consulted  - Perform Range of Motion 3 times a day  - Reposition patient every 3 hours    - Dangle patient 3 times a day  - Stand patient 3 times a day  - Ambulate patient 3 times a day  - Out of bed to chair 3 times a day   - Out of bed for meals 3 times a day  - Out of bed for toileting  - Record patient progress and toleration of activity level   Outcome: Progressing     Problem: DISCHARGE PLANNING  Goal: Discharge to home or other facility with appropriate resources  Description: INTERVENTIONS:  - Identify barriers to discharge w/patient and caregiver  - Arrange for needed discharge resources and transportation as appropriate  - Identify discharge learning needs (meds, wound care, etc )  - Arrange for interpretive services to assist at discharge as needed  - Refer to Case Management Department for coordinating discharge planning if the patient needs post-hospital services based on physician/advanced practitioner order or complex needs related to functional status, cognitive ability, or social support system  Outcome: Progressing     Problem: Knowledge Deficit  Goal: Patient/family/caregiver demonstrates understanding of disease process, treatment plan, medications, and discharge instructions  Description: Complete learning assessment and assess knowledge base    Interventions:  - Provide teaching at level of understanding  - Provide teaching via preferred learning methods  Outcome: Progressing

## 2023-05-15 LAB
ANION GAP SERPL CALCULATED.3IONS-SCNC: 5 MMOL/L (ref 4–13)
BASOPHILS # BLD AUTO: 0.03 THOUSANDS/ÂΜL (ref 0–0.1)
BASOPHILS NFR BLD AUTO: 0 % (ref 0–1)
BUN SERPL-MCNC: 12 MG/DL (ref 5–25)
CALCIUM SERPL-MCNC: 9 MG/DL (ref 8.4–10.2)
CHLORIDE SERPL-SCNC: 101 MMOL/L (ref 96–108)
CO2 SERPL-SCNC: 33 MMOL/L (ref 21–32)
CREAT SERPL-MCNC: 0.83 MG/DL (ref 0.6–1.3)
EOSINOPHIL # BLD AUTO: 0.3 THOUSAND/ÂΜL (ref 0–0.61)
EOSINOPHIL NFR BLD AUTO: 5 % (ref 0–6)
ERYTHROCYTE [DISTWIDTH] IN BLOOD BY AUTOMATED COUNT: 13 % (ref 11.6–15.1)
GFR SERPL CREATININE-BSD FRML MDRD: 107 ML/MIN/1.73SQ M
GLUCOSE SERPL-MCNC: 99 MG/DL (ref 65–140)
HCT VFR BLD AUTO: 45.2 % (ref 36.5–49.3)
HGB BLD-MCNC: 14.7 G/DL (ref 12–17)
IMM GRANULOCYTES # BLD AUTO: 0.02 THOUSAND/UL (ref 0–0.2)
IMM GRANULOCYTES NFR BLD AUTO: 0 % (ref 0–2)
LYMPHOCYTES # BLD AUTO: 2.18 THOUSANDS/ÂΜL (ref 0.6–4.47)
LYMPHOCYTES NFR BLD AUTO: 33 % (ref 14–44)
MCH RBC QN AUTO: 28.2 PG (ref 26.8–34.3)
MCHC RBC AUTO-ENTMCNC: 32.5 G/DL (ref 31.4–37.4)
MCV RBC AUTO: 87 FL (ref 82–98)
MONOCYTES # BLD AUTO: 0.61 THOUSAND/ÂΜL (ref 0.17–1.22)
MONOCYTES NFR BLD AUTO: 9 % (ref 4–12)
NEUTROPHILS # BLD AUTO: 3.53 THOUSANDS/ÂΜL (ref 1.85–7.62)
NEUTS SEG NFR BLD AUTO: 53 % (ref 43–75)
NRBC BLD AUTO-RTO: 0 /100 WBCS
PLATELET # BLD AUTO: 255 THOUSANDS/UL (ref 149–390)
PMV BLD AUTO: 10.8 FL (ref 8.9–12.7)
POTASSIUM SERPL-SCNC: 3.7 MMOL/L (ref 3.5–5.3)
RBC # BLD AUTO: 5.22 MILLION/UL (ref 3.88–5.62)
SODIUM SERPL-SCNC: 139 MMOL/L (ref 135–147)
WBC # BLD AUTO: 6.67 THOUSAND/UL (ref 4.31–10.16)

## 2023-05-15 RX ADMIN — ACETAMINOPHEN 975 MG: 325 TABLET ORAL at 05:17

## 2023-05-15 RX ADMIN — ENOXAPARIN SODIUM 60 MG: 60 INJECTION SUBCUTANEOUS at 01:30

## 2023-05-15 RX ADMIN — CEFAZOLIN SODIUM 2000 MG: 2 SOLUTION INTRAVENOUS at 20:46

## 2023-05-15 RX ADMIN — ACETAMINOPHEN 975 MG: 325 TABLET ORAL at 22:33

## 2023-05-15 RX ADMIN — ACETAMINOPHEN 975 MG: 325 TABLET ORAL at 12:40

## 2023-05-15 RX ADMIN — MORPHINE SULFATE 2 MG: 2 INJECTION, SOLUTION INTRAMUSCULAR; INTRAVENOUS at 18:26

## 2023-05-15 RX ADMIN — HYDROCHLOROTHIAZIDE 25 MG: 25 TABLET ORAL at 07:25

## 2023-05-15 RX ADMIN — LISINOPRIL 10 MG: 10 TABLET ORAL at 07:25

## 2023-05-15 RX ADMIN — MORPHINE SULFATE 2 MG: 2 INJECTION, SOLUTION INTRAMUSCULAR; INTRAVENOUS at 07:26

## 2023-05-15 RX ADMIN — CEFAZOLIN SODIUM 2000 MG: 2 SOLUTION INTRAVENOUS at 12:41

## 2023-05-15 RX ADMIN — CEFAZOLIN SODIUM 2000 MG: 2 SOLUTION INTRAVENOUS at 05:17

## 2023-05-15 RX ADMIN — OXYCODONE HYDROCHLORIDE 5 MG: 5 TABLET ORAL at 17:29

## 2023-05-15 RX ADMIN — OXYCODONE HYDROCHLORIDE 5 MG: 5 TABLET ORAL at 22:33

## 2023-05-15 RX ADMIN — OXYCODONE HYDROCHLORIDE 5 MG: 5 TABLET ORAL at 05:18

## 2023-05-15 RX ADMIN — ENOXAPARIN SODIUM 60 MG: 60 INJECTION SUBCUTANEOUS at 12:41

## 2023-05-15 NOTE — ASSESSMENT & PLAN NOTE
Present admission secondary to right lower extremity cellulitis  Does not meet any SIRS criteria  Continue to trend CBC and temperature curve closely  Leukocytosis resolved   continue with current antibiotic

## 2023-05-15 NOTE — ASSESSMENT & PLAN NOTE
Maintained on lisinopril and hydrochlorothiazide  Elevated blood pressure admission likely secondary to pain    Continue with current antihypertensive medication

## 2023-05-15 NOTE — ASSESSMENT & PLAN NOTE
Patient presents with increased erythema, swelling and pain in the right lower extremity  Saw his PCP 2 days ago and was started on Keflex but continued to have fever and worsening redness and swelling despite being on antibiotics for 2 days  Venous Doppler ultrasound negative for DVT  Denies any trauma  No prior history of MRSA  Received ceftriaxone and vancomycin in the emergency room  We will treat with high-dose IV cefazolin  Continue with serial exams/neurovascular checks  Improved erythema and decreased swelling but still with significant induration  Continue with IV antibiotics and follow-up on blood cultures    Leukocytosis has resolved

## 2023-05-15 NOTE — PLAN OF CARE
Problem: Potential for Falls  Goal: Patient will remain free of falls  Description: INTERVENTIONS:  - Educate patient/family on patient safety including physical limitations  - Instruct patient to call for assistance with activity   - Consult OT/PT to assist with strengthening/mobility   - Keep Call bell within reach  - Keep bed low and locked with side rails adjusted as appropriate  - Keep care items and personal belongings within reach  - Initiate and maintain comfort rounds  - Make Fall Risk Sign visible to staff  - Offer Toileting every 2 Hours, in advance of need  - Outcome: Progressing     Problem: PAIN - ADULT  Goal: Verbalizes/displays adequate comfort level or baseline comfort level  Description: Interventions:  - Encourage patient to monitor pain and request assistance  - Assess pain using appropriate pain scale  - Administer analgesics based on type and severity of pain and evaluate response  - Implement non-pharmacological measures as appropriate and evaluate response  - Consider cultural and social influences on pain and pain management  - Notify physician/advanced practitioner if interventions unsuccessful or patient reports new pain  Outcome: Progressing     Problem: INFECTION - ADULT  Goal: Absence or prevention of progression during hospitalization  Description: INTERVENTIONS:  - Assess and monitor for signs and symptoms of infection  - Monitor lab/diagnostic results  - Monitor all insertion sites, i e  indwelling lines, tubes, and drains  - Monitor endotracheal if appropriate and nasal secretions for changes in amount and color  - [place] appropriate cooling/warming therapies per order  - Administer medications as ordered  - Instruct and encourage patient and family to use good hand hygiene technique  - Identify and instruct in appropriate isolation precautions for identified infection/condition  Outcome: Progressing  Goal: Absence of fever/infection during neutropenic period  Description: INTERVENTIONS:  - Monitor WBC    Outcome: Progressing     Problem: SAFETY ADULT  Goal: Patient will remain free of falls  Description: INTERVENTIONS:  - Educate patient/family on patient safety including physical limitations  - Instruct patient to call for assistance with activity   - Consult OT/PT to assist with strengthening/mobility   - Keep Call bell within reach  - Keep bed low and locked with side rails adjusted as appropriate  - Keep care items and personal belongings within reach  - Initiate and maintain comfort rounds  - Make Fall Risk Sign visible to staff  - Offer Toileting every 2 Hours, in advance of need  - Outcome: Progressing  Goal: Maintain or return to baseline ADL function  Description: INTERVENTIONS:  -  Assess patient's ability to carry out ADLs; assess patient's baseline for ADL function and identify physical deficits which impact ability to perform ADLs (bathing, care of mouth/teeth, toileting, grooming, dressing, etc )  - Assess/evaluate cause of self-care deficits   - Assess range of motion  - Assess patient's mobility; develop plan if impaired  - Assess patient's need for assistive devices and provide as appropriate  - Encourage maximum independence but intervene and supervise when necessary  - Involve family in performance of ADLs  - Assess for home care needs following discharge   - Consider OT consult to assist with ADL evaluation and planning for discharge  - Provide patient education as appropriate  Outcome: Progressing  Goal: Maintains/Returns to pre admission functional level  Description: INTERVENTIONS:  - Perform BMAT or MOVE assessment daily    - Set and communicate daily mobility goal to care team and patient/family/caregiver     - Collaborate with rehabilitation services on mobility goals if consulted  - Stand patient 3 times a day  - Ambulate patient 3 times a day  - Out of bed to chair 3 times a day   - Out of bed for meals 3 times a day  - Out of bed for toileting  - Record patient progress and toleration of activity level   Outcome: Progressing     Problem: DISCHARGE PLANNING  Goal: Discharge to home or other facility with appropriate resources  Description: INTERVENTIONS:  - Identify barriers to discharge w/patient and caregiver  - Arrange for needed discharge resources and transportation as appropriate  - Identify discharge learning needs (meds, wound care, etc )  - Arrange for interpretive services to assist at discharge as needed  - Refer to Case Management Department for coordinating discharge planning if the patient needs post-hospital services based on physician/advanced practitioner order or complex needs related to functional status, cognitive ability, or social support system  Outcome: Progressing     Problem: Knowledge Deficit  Goal: Patient/family/caregiver demonstrates understanding of disease process, treatment plan, medications, and discharge instructions  Description: Complete learning assessment and assess knowledge base    Interventions:  - Provide teaching at level of understanding  - Provide teaching via preferred learning methods  Outcome: Progressing

## 2023-05-15 NOTE — PROGRESS NOTES
[address]  Progress Note  Name: Behzad Garcia  MRN: [id number]  Unit/Bed#: -01 I Date of Admission: 5/13/2023   Date of Service: 5/15/2023 I Hospital Day: 2    Assessment/Plan   * Cellulitis of right lower extremity  Assessment & Plan  Patient presents with increased erythema, swelling and pain in the right lower extremity  Saw his PCP 2 days ago and was started on Keflex but continued to have fever and worsening redness and swelling despite being on antibiotics for 2 days  Venous Doppler ultrasound negative for DVT  Denies any trauma  No prior history of MRSA  Received ceftriaxone and vancomycin in the emergency room  We will treat with high-dose IV cefazolin  Continue with serial exams/neurovascular checks  Improved erythema and decreased swelling but still with significant induration  Continue with IV antibiotics and follow-up on blood cultures  Leukocytosis has resolved      Leukocytosis  Assessment & Plan  Present admission secondary to right lower extremity cellulitis  Does not meet any SIRS criteria  Continue to trend CBC and temperature curve closely  Leukocytosis resolved   continue with current antibiotic    Morbid obesity ([address] )  Assessment & Plan  BMI 53 1  Nutrition therapeutic lifestyle modification recommended  Essential hypertension  Assessment & Plan  Maintained on lisinopril and hydrochlorothiazide  Elevated blood pressure admission likely secondary to pain  Continue with current antihypertensive medication    Elevated bilirubin  Assessment & Plan  History of fatty liver  Elevated bilirubin which is chronic  Outpatient follow-up  Recommended dietary modification and weight loss  Hypokalemia  Assessment & Plan  Replaced  Follow-up BMP in a m  VTE Pharmacologic Prophylaxis: VTE Score: 5 High Risk (Score >/= 5) - Pharmacological DVT Prophylaxis Ordered: enoxaparin (Lovenox)  Sequential Compression Devices Ordered      Patient Centered Rounds: I performed bedside rounds with nursing staff today  Discussions with Specialists or Other Care Team Provider: Discussed with case management    Education and Discussions with Family / Patient: Attempted to update  (significant other) via phone  Left voicemail  Total Time Spent on Date of Encounter in care of patient: 45 minutes This time was spent on one or more of the following: performing physical exam; counseling and coordination of care; obtaining or reviewing history; documenting in the medical record; reviewing/ordering tests, medications or procedures; communicating with other healthcare professionals and discussing with patient's family/caregivers  Current Length of Stay: 2 day(s)  Current Patient Status: Inpatient   Certification Statement: The patient will continue to require additional inpatient hospital stay due to Ongoing IV antibiotic  Discharge Plan: Anticipate discharge tomorrow to home  Code Status: Level 1 - Full Code    Subjective:   Seen and examined at bedside  Remains afebrile  Has somewhat decreased redness but still with significant pain and swelling  in the right lower extremity  Objective:     Vitals:   Temp (24hrs), Av 1 °F (36 7 °C), Min:98 1 °F (36 7 °C), Max:98 1 °F (36 7 °C)    Temp:  [98 1 °F (36 7 °C)] 98 1 °F (36 7 °C)  HR:  [80-95] 80  Resp:  [20] 20  BP: (133-154)/(83-99) 141/85  SpO2:  [96 %-97 %] 97 %  Body mass index is 53 16 kg/m²  Input and Output Summary (last 24 hours): Intake/Output Summary (Last 24 hours) at 5/15/2023 1036  Last data filed at 5/15/2023 0900  Gross per 24 hour   Intake 690 ml   Output --   Net 690 ml       Physical Exam:   Physical Exam  Constitutional:       Appearance: He is obese  HENT:      Nose: Nose normal       Mouth/Throat:      Mouth: Mucous membranes are moist    Eyes:      Pupils: Pupils are equal, round, and reactive to light        Comments: Right conjunctival hemorrhage   Cardiovascular: Rate and Rhythm: Normal rate and regular rhythm  Pulses: Normal pulses  Pulmonary:      Effort: Pulmonary effort is normal       Breath sounds: Normal breath sounds  Abdominal:      General: Abdomen is flat  Bowel sounds are normal       Palpations: Abdomen is soft  Musculoskeletal:      Cervical back: Neck supple  Right lower leg: Edema present  Comments: Erythema induration and swelling of the right lower extremity somewhat decreased on admission   Neurological:      General: No focal deficit present  Mental Status: He is alert and oriented to person, place, and time  Mental status is at baseline  Psychiatric:         Mood and Affect: Mood normal          Additional Data:     Labs:  Results from last 7 days   Lab Units 05/15/23  0525   WBC Thousand/uL 6 67   HEMOGLOBIN g/dL 14 7   HEMATOCRIT % 45 2   PLATELETS Thousands/uL 255   NEUTROS PCT % 53   LYMPHS PCT % 33   MONOS PCT % 9   EOS PCT % 5     Results from last 7 days   Lab Units 05/15/23  0525 05/14/23  0516 05/13/23  1123   SODIUM mmol/L 139   < > 137   POTASSIUM mmol/L 3 7   < > 4 0   CHLORIDE mmol/L 101   < > 99   CO2 mmol/L 33*   < > 30   BUN mg/dL 12   < > 11   CREATININE mg/dL 0 83   < > 0 93   ANION GAP mmol/L 5   < > 8   CALCIUM mg/dL 9 0   < > 9 5   ALBUMIN g/dL  --   --  3 9   TOTAL BILIRUBIN mg/dL  --   --  1 53*   ALK PHOS U/L  --   --  114*   ALT U/L  --   --  24   AST U/L  --   --  23   GLUCOSE RANDOM mg/dL 99   < > 114    < > = values in this interval not displayed  Results from last 7 days   Lab Units 05/13/23  1123   INR  0 98             Results from last 7 days   Lab Units 05/13/23  1123   LACTIC ACID mmol/L 0 8       Lines/Drains:  Invasive Devices     Peripheral Intravenous Line  Duration           Peripheral IV 05/13/23 Dorsal (posterior); Left Forearm 1 day                      Imaging: No pertinent imaging reviewed      Recent Cultures (last 7 days):   Results from last 7 days   Lab Units 05/13/23  1125 05/13/23  1123   BLOOD CULTURE  No Growth at 24 hrs  No Growth at 24 hrs  Last 24 Hours Medication List:   Current Facility-Administered Medications   Medication Dose Route Frequency Provider Last Rate   • acetaminophen  975 mg Oral [facility] Tahira Camarillo MD     • cefazolin  2,000 mg Intravenous Ngoc Willis MD Stopped (05/15/23 0337)   • enoxaparin  60 mg Subcutaneous BID Tahira Camarillo MD     • hydrALAZINE  5 mg Intravenous Q6H PRN Tahira Camarillo MD     • hydrochlorothiazide  25 mg Oral Daily Tahira Camarillo MD     • lisinopril  10 mg Oral Daily Tahira Camarillo MD     • morphine injection  2 mg Intravenous Q6H PRN Tahria Camarillo MD     • oxyCODONE  5 mg Oral Q4H PRN Tahira Camarillo MD          Today, Patient Was Seen By: Tahira Camarillo MD    **Please Note: This note may have been constructed using a voice recognition system  **

## 2023-05-15 NOTE — UTILIZATION REVIEW
NOTIFICATION OF INPATIENT ADMISSION   AUTHORIZATION REQUEST   SERVICING FACILITY:   [address] [place], [place] [address]lisa Samano [address] [place] [address], [address]  Tax ID: [id number]  NPI: [id number] ATTENDING PROVIDER:  Attending Name and NPI#: Ajit Urena Md [[id number]]  [facility] [address] [place] [address], [facility] [address]  Phone: [phone]   ADMISSION INFORMATION:  Place of Service: [place] [address]  Place of Service Code: 21  Inpatient Admission Date/Time: 5/13/23 11:59 AM  Discharge Date/Time: No discharge date for patient encounter  Admitting Diagnosis Code/Description:  Cellulitis [L03 90]  Fever [R50 9]  Cellulitis of right leg [C41 229]     UTILIZATION REVIEW CONTACT:  Natalee Carvajal Utilization   Network Utilization Review Department  Phone: [phone]  Fax [phone]  Email: [email or web address]  Contact for approvals/pending authorizations, clinical reviews, and discharge  PHYSICIAN ADVISORY SERVICES:  Medical Necessity Denial & [employer] Review  Phone: [phone]  Fax: [phone]  Email: [email or web address]

## 2023-05-15 NOTE — PLAN OF CARE
Problem: Potential for Falls  Goal: Patient will remain free of falls  Description: INTERVENTIONS:  - Educate patient/family on patient safety including physical limitations  - Instruct patient to call for assistance with activity   - Consult OT/PT to assist with strengthening/mobility   - Keep Call bell within reach  - Keep bed low and locked with side rails adjusted as appropriate  - Keep care items and personal belongings within reach  - Initiate and maintain comfort rounds  - Make Fall Risk Sign visible to staff  - Offer Toileting every 2 Hours, in advance of need  - Apply yellow socks and bracelet for high fall risk patients  - Consider moving patient to room near nurses station  Outcome: Progressing     Problem: PAIN - ADULT  Goal: Verbalizes/displays adequate comfort level or baseline comfort level  Description: Interventions:  - Encourage patient to monitor pain and request assistance  - Assess pain using appropriate pain scale  - Administer analgesics based on type and severity of pain and evaluate response  - Implement non-pharmacological measures as appropriate and evaluate response  - Consider cultural and social influences on pain and pain management  - Notify physician/advanced practitioner if interventions unsuccessful or patient reports new pain  Outcome: Progressing     Problem: INFECTION - ADULT  Goal: Absence or prevention of progression during hospitalization  Description: INTERVENTIONS:  - Assess and monitor for signs and symptoms of infection  - Monitor lab/diagnostic results  - Monitor all insertion sites, i e  indwelling lines, tubes, and drains  - Monitor endotracheal if appropriate and nasal secretions for changes in amount and color  - [place] appropriate cooling/warming therapies per order  - Administer medications as ordered  - Instruct and encourage patient and family to use good hand hygiene technique  - Identify and instruct in appropriate isolation precautions for identified infection/condition  Outcome: Progressing  Goal: Absence of fever/infection during neutropenic period  Description: INTERVENTIONS:  - Monitor WBC    Outcome: Progressing     Problem: SAFETY ADULT  Goal: Patient will remain free of falls  Description: INTERVENTIONS:  - Educate patient/family on patient safety including physical limitations  - Instruct patient to call for assistance with activity   - Consult OT/PT to assist with strengthening/mobility   - Keep Call bell within reach  - Keep bed low and locked with side rails adjusted as appropriate  - Keep care items and personal belongings within reach  - Initiate and maintain comfort rounds  - Make Fall Risk Sign visible to staff  - Apply yellow socks and bracelet for high fall risk patients  - Consider moving patient to room near nurses station  Outcome: Progressing  Goal: Maintain or return to baseline ADL function  Description: INTERVENTIONS:  -  Assess patient's ability to carry out ADLs; assess patient's baseline for ADL function and identify physical deficits which impact ability to perform ADLs (bathing, care of mouth/teeth, toileting, grooming, dressing, etc )  - Assess/evaluate cause of self-care deficits   - Assess range of motion  - Assess patient's mobility; develop plan if impaired  - Assess patient's need for assistive devices and provide as appropriate  - Encourage maximum independence but intervene and supervise when necessary  - Involve family in performance of ADLs  - Assess for home care needs following discharge   - Consider OT consult to assist with ADL evaluation and planning for discharge  - Provide patient education as appropriate  Outcome: Progressing  Goal: Maintains/Returns to pre admission functional level  Description: INTERVENTIONS:  - Perform BMAT or MOVE assessment daily    - Set and communicate daily mobility goal to care team and patient/family/caregiver     - Collaborate with rehabilitation services on mobility goals if consulted  - Perform Range of Motion 3 times a day  - Reposition patient every 3 hours  - Dangle patient 3 times a day  - Stand patient 3 times a day  - Ambulate patient 3 times a day  - Out of bed to chair 3 times a day   - Out of bed for meals 3 times a day  - Out of bed for toileting  - Record patient progress and toleration of activity level   Outcome: Progressing     Problem: DISCHARGE PLANNING  Goal: Discharge to home or other facility with appropriate resources  Description: INTERVENTIONS:  - Identify barriers to discharge w/patient and caregiver  - Arrange for needed discharge resources and transportation as appropriate  - Identify discharge learning needs (meds, wound care, etc )  - Arrange for interpretive services to assist at discharge as needed  - Refer to Case Management Department for coordinating discharge planning if the patient needs post-hospital services based on physician/advanced practitioner order or complex needs related to functional status, cognitive ability, or social support system  Outcome: Progressing     Problem: Knowledge Deficit  Goal: Patient/family/caregiver demonstrates understanding of disease process, treatment plan, medications, and discharge instructions  Description: Complete learning assessment and assess knowledge base    Interventions:  - Provide teaching at level of understanding  - Provide teaching via preferred learning methods  Outcome: Progressing

## 2023-05-16 ENCOUNTER — APPOINTMENT (INPATIENT)
Dept: CT IMAGING | Facility: HOSPITAL | Age: 43
End: 2023-05-16

## 2023-05-16 RX ORDER — CEFAZOLIN SODIUM 2 G/50ML
2000 SOLUTION INTRAVENOUS EVERY 6 HOURS
Status: DISCONTINUED | OUTPATIENT
Start: 2023-05-16 | End: 2023-05-18 | Stop reason: HOSPADM

## 2023-05-16 RX ADMIN — MORPHINE SULFATE 2 MG: 2 INJECTION, SOLUTION INTRAMUSCULAR; INTRAVENOUS at 19:55

## 2023-05-16 RX ADMIN — MORPHINE SULFATE 2 MG: 2 INJECTION, SOLUTION INTRAMUSCULAR; INTRAVENOUS at 01:32

## 2023-05-16 RX ADMIN — ENOXAPARIN SODIUM 60 MG: 60 INJECTION SUBCUTANEOUS at 01:29

## 2023-05-16 RX ADMIN — CEFAZOLIN SODIUM 2000 MG: 2 SOLUTION INTRAVENOUS at 04:59

## 2023-05-16 RX ADMIN — ENOXAPARIN SODIUM 60 MG: 60 INJECTION SUBCUTANEOUS at 12:32

## 2023-05-16 RX ADMIN — ACETAMINOPHEN 975 MG: 325 TABLET ORAL at 21:37

## 2023-05-16 RX ADMIN — ACETAMINOPHEN 975 MG: 325 TABLET ORAL at 14:19

## 2023-05-16 RX ADMIN — HYDROCHLOROTHIAZIDE 25 MG: 25 TABLET ORAL at 09:03

## 2023-05-16 RX ADMIN — OXYCODONE HYDROCHLORIDE 5 MG: 5 TABLET ORAL at 17:08

## 2023-05-16 RX ADMIN — CEFAZOLIN SODIUM 2000 MG: 2 SOLUTION INTRAVENOUS at 12:33

## 2023-05-16 RX ADMIN — CEFAZOLIN SODIUM 2000 MG: 2 SOLUTION INTRAVENOUS at 18:29

## 2023-05-16 RX ADMIN — OXYCODONE HYDROCHLORIDE 5 MG: 5 TABLET ORAL at 12:35

## 2023-05-16 RX ADMIN — LISINOPRIL 10 MG: 10 TABLET ORAL at 09:03

## 2023-05-16 RX ADMIN — ACETAMINOPHEN 975 MG: 325 TABLET ORAL at 05:00

## 2023-05-16 RX ADMIN — IOHEXOL 100 ML: 350 INJECTION, SOLUTION INTRAVENOUS at 09:22

## 2023-05-16 NOTE — ASSESSMENT & PLAN NOTE
1  Keep skin clean and dry  2  Apply topical antibiotics and dressing daily  3  Finish Augmentin  4  Watch for S&S of infection (redness, swelling, drainage, fever)  5  Follow-up as scheduled for rabies vaccine day 7 and day 14  Maintained on lisinopril and hydrochlorothiazide  Elevated blood pressure admission likely secondary to pain    Continue with current antihypertensive medication

## 2023-05-16 NOTE — PROGRESS NOTES
[address]  Progress Note  Name: Daisy Lindquits  MRN: [id number]  Unit/Bed#: -01 I Date of Admission: 5/13/2023   Date of Service: 5/16/2023 I Hospital Day: 3    Assessment/Plan   * Cellulitis of right lower extremity  Assessment & Plan  · Patient presents with increased erythema, swelling and pain in the right lower extremity  · Saw his PCP 2 days prior to admission and was started on Keflex but continued to have fever and worsening redness and swelling despite being on antibiotics for 2 days  · Venous Doppler ultrasound negative for DVT  · Denies any trauma  · No prior history of MRSA  · Received ceftriaxone and vancomycin in the emergency room  · Continue high-dose Ancef  Continue with serial exams/neurovascular checks  Improved erythema and decreased swelling but still with significant induration  Continue with IV antibiotics and follow-up on blood cultures which were negative leukocytosis has resolved  He still has pain he does have significant swelling and induration in the calf performed CT of the lower extremity with a normal abscess just some edema  We will ask infectious disease to evaluate as well is still with significant pain and edema to see if any antibiotics need to be added or changed  Also asked orthopedics to evaluate as there is a questionable Achillis tendon tendinitis versus rupture      Leukocytosis  Assessment & Plan  Present admission secondary to right lower extremity cellulitis  Does not meet any SIRS criteria  Continue to trend CBC and temperature curve closely  Leukocytosis resolved   continue with current antibiotic    Morbid obesity ([address] )  Assessment & Plan  BMI 53 1  Nutrition therapeutic lifestyle modification recommended  Essential hypertension  Assessment & Plan  Maintained on lisinopril and hydrochlorothiazide  Elevated blood pressure admission likely secondary to pain    Continue with current antihypertensive medication    Elevated bilirubin  Assessment & Plan  History of fatty liver  Elevated bilirubin which is chronic  Outpatient follow-up  Recommended dietary modification and weight loss  Hypokalemia  Assessment & Plan  resolved               VTE Pharmacologic Prophylaxis: VTE Score: 5 High Risk (Score >/= 5) - Pharmacological DVT Prophylaxis Ordered: enoxaparin (Lovenox)  Sequential Compression Devices Ordered  Patient Centered Rounds: I performed bedside rounds with nursing staff today  Discussions with Specialists or Other Care Team Provider: We will discuss with infectious disease and Ortho    Education and Discussions with Family / Patient: Patient declined call to   Total Time Spent on Date of Encounter in care of patient: 35 minutes This time was spent on one or more of the following: performing physical exam; counseling and coordination of care; obtaining or reviewing history; documenting in the medical record; reviewing/ordering tests, medications or procedures; communicating with other healthcare professionals and discussing with patient's family/caregivers  Current Length of Stay: 3 day(s)  Current Patient Status: Inpatient   Certification Statement: The patient will continue to require additional inpatient hospital stay due to Right lower extremity cellulitis  Discharge Plan: Anticipate discharge in 24-48 hrs to home  Code Status: Level 1 - Full Code    Subjective:   Seen and examined with daughter at pain has improved he is still has significant amount of pain in the calf  Objective:     Vitals:   Temp (24hrs), Av 9 °F (36 6 °C), Min:97 7 °F (36 5 °C), Max:98 1 °F (36 7 °C)    Temp:  [97 7 °F (36 5 °C)-98 1 °F (36 7 °C)] 98 1 °F (36 7 °C)  HR:  [77-87] 87  Resp:  [17-22] 22  BP: (135-147)/() 147/87  SpO2:  [93 %-98 %] 93 %  Body mass index is 53 16 kg/m²  Input and Output Summary (last 24 hours):      Intake/Output Summary (Last 24 hours) at 2023 1031  Last data filed at 5/15/2023 1729  Gross per 24 hour   Intake 770 ml   Output --   Net 770 ml       Physical Exam:   Physical Exam  Vitals and nursing note reviewed  Constitutional:       General: He is not in acute distress  Appearance: He is well-developed  He is obese  HENT:      Head: Normocephalic and atraumatic  Eyes:      Conjunctiva/sclera: Conjunctivae normal    Cardiovascular:      Rate and Rhythm: Normal rate and regular rhythm  Heart sounds: No murmur heard  Pulmonary:      Effort: Pulmonary effort is normal  No respiratory distress  Breath sounds: Normal breath sounds  No wheezing or rales  Abdominal:      General: Bowel sounds are normal  There is no distension  Palpations: Abdomen is soft  Tenderness: There is no abdominal tenderness  Musculoskeletal:         General: Swelling (right lower extremity with erythema and calf tenderness) present  Cervical back: Neck supple  Skin:     General: Skin is warm and dry  Capillary Refill: Capillary refill takes less than 2 seconds  Neurological:      Mental Status: He is alert and oriented to person, place, and time     Psychiatric:         Mood and Affect: Mood normal           Additional Data:     Labs:  Results from last 7 days   Lab Units 05/15/23  0525   WBC Thousand/uL 6 67   HEMOGLOBIN g/dL 14 7   HEMATOCRIT % 45 2   PLATELETS Thousands/uL 255   NEUTROS PCT % 53   LYMPHS PCT % 33   MONOS PCT % 9   EOS PCT % 5     Results from last 7 days   Lab Units 05/15/23  0525 05/14/23  0516 05/13/23  1123   SODIUM mmol/L 139   < > 137   POTASSIUM mmol/L 3 7   < > 4 0   CHLORIDE mmol/L 101   < > 99   CO2 mmol/L 33*   < > 30   BUN mg/dL 12   < > 11   CREATININE mg/dL 0 83   < > 0 93   ANION GAP mmol/L 5   < > 8   CALCIUM mg/dL 9 0   < > 9 5   ALBUMIN g/dL  --   --  3 9   TOTAL BILIRUBIN mg/dL  --   --  1 53*   ALK PHOS U/L  --   --  114*   ALT U/L  --   --  24   AST U/L  --   --  23   GLUCOSE RANDOM mg/dL 99   < > 114    < > = values in this interval not displayed  Results from last 7 days   Lab Units 05/13/23  1123   INR  0 98             Results from last 7 days   Lab Units 05/13/23  1123   LACTIC ACID mmol/L 0 8       Lines/Drains:  Invasive Devices     Peripheral Intravenous Line  Duration           Peripheral IV 05/13/23 Dorsal (posterior); Left Forearm 2 days                      Imaging: Reviewed radiology reports from this admission including: ultrasound(s)    Recent Cultures (last 7 days):   Results from last 7 days   Lab Units 05/13/23  1125 05/13/23  1123   BLOOD CULTURE  No Growth at 48 hrs  No Growth at 48 hrs  Last 24 Hours Medication List:   Current Facility-Administered Medications   Medication Dose Route Frequency Provider Last Rate   • acetaminophen  975 mg Oral [facility] Rolando Villa MD     • cefazolin  2,000 mg Intravenous Tamar Bach MD Stopped (05/16/23 5301)   • enoxaparin  60 mg Subcutaneous BID Rolando Villa MD     • hydrALAZINE  5 mg Intravenous Q6H PRN Rolando Villa MD     • hydrochlorothiazide  25 mg Oral Daily Rolando Villa MD     • lisinopril  10 mg Oral Daily Rolando Villa MD     • morphine injection  2 mg Intravenous Q6H PRN Rolando Villa MD     • oxyCODONE  5 mg Oral Q4H PRN Rolando Villa MD          Today, Patient Was Seen By: Edna Ayala MD    **Please Note: This note may have been constructed using a voice recognition system  **

## 2023-05-16 NOTE — PLAN OF CARE
Problem: Potential for Falls  Goal: Patient will remain free of falls  Description: INTERVENTIONS:  - Educate patient/family on patient safety including physical limitations  - Instruct patient to call for assistance with activity   - Consult OT/PT to assist with strengthening/mobility   - Keep Call bell within reach  - Keep bed low and locked with side rails adjusted as appropriate  - Keep care items and personal belongings within reach  - Initiate and maintain comfort rounds  - Make Fall Risk Sign visible to staff  - Apply yellow socks and bracelet for high fall risk patients  - Consider moving patient to room near nurses station  Outcome: Progressing     Problem: PAIN - ADULT  Goal: Verbalizes/displays adequate comfort level or baseline comfort level  Description: Interventions:  - Encourage patient to monitor pain and request assistance  - Assess pain using appropriate pain scale  - Administer analgesics based on type and severity of pain and evaluate response  - Implement non-pharmacological measures as appropriate and evaluate response  - Consider cultural and social influences on pain and pain management  - Notify physician/advanced practitioner if interventions unsuccessful or patient reports new pain  Outcome: Progressing     Problem: INFECTION - ADULT  Goal: Absence or prevention of progression during hospitalization  Description: INTERVENTIONS:  - Assess and monitor for signs and symptoms of infection  - Monitor lab/diagnostic results  - Monitor all insertion sites, i e  indwelling lines, tubes, and drains  - Monitor endotracheal if appropriate and nasal secretions for changes in amount and color  - [place] appropriate cooling/warming therapies per order  - Administer medications as ordered  - Instruct and encourage patient and family to use good hand hygiene technique  - Identify and instruct in appropriate isolation precautions for identified infection/condition  Outcome: Progressing     Problem: SAFETY ADULT  Goal: Patient will remain free of falls  Description: INTERVENTIONS:  - Educate patient/family on patient safety including physical limitations  - Instruct patient to call for assistance with activity   - Consult OT/PT to assist with strengthening/mobility   - Keep Call bell within reach  - Keep bed low and locked with side rails adjusted as appropriate  - Keep care items and personal belongings within reach  - Initiate and maintain comfort rounds  - Make Fall Risk Sign visible to staff  - Apply yellow socks and bracelet for high fall risk patients  - Consider moving patient to room near nurses station  Outcome: Progressing

## 2023-05-16 NOTE — CONSULTS
Consultation - Infectious Disease   Cuauhtemoc Heredia 39 y o  male MRN: [id number]  Unit/Bed#: -01 Encounter: [id number]      Inpatient consult to Infectious Diseases  Consult performed by: Apurva López PA-C  Consult ordered by: Mendez Crane MD        REQUIRED DOCUMENTATION:     1  This service was provided via Telemedicine  2  Provider located at [facility]  3  TeleMed provider: Yadi Chin PA-C  4  Identify all parties in room with patient during tele consult: Patient's wife and uncle   11  After connecting through [email or web address], patient was identified by name and date of birth and assistant checked wristband  Patient was then informed that this was a Telemedicine visit and that the exam was being conducted confidentially over secure lines  My office door was closed  No one else was in the room  Patient acknowledged consent and understanding of privacy and security of the Telemedicine visit, and gave us permission to have the assistant stay in the room in order to assist with the history and to conduct the exam   I informed the patient that I have reviewed their record in Epic and presented the opportunity for them to ask any questions regarding the visit today  The patient agreed to participate  Assessment/Recommendations     1  Recurrent RLE cellulitis  This is likely due to uncontrolled chronic lower extremity edema, obesity, and untreated tinea pedis  Appears  streptococcal in nature based on clinical exam  Slow improvement is likely due to uncontrolled edema and possible poor abx tissue absorption due to morbid obesity  CECT LE negative for abscess or gas to suggest necrotizing infection  Venous duplex negative for DVT  Blood cultures are negative  He remains afebrile and WBC normalized     -continue IV cefazolin, though increase dose to 2g q6h   -follow up blood cultures  -recommend aggressive edema control measures   -anticipate eventual transition to PO keflex 1g QID once clinically improved   -recommend use of topical antifungal powders for chronic tinea pedis   -serial skin exams     2  Severe achilles tendinosis  Noted on CT of RLE  Concern for superimposed partial tear  Ortho consulted by primary team      3  Obesity  BMI 53  16      4  Hypertension  Discussed in detail with the primary service  History     Reason for Consult:   HPI: Silas Valle is a 39y o  year old male with history of morbid obesity, hypertension who presented to [facility] on 5/13 with 4-day history of worsening right lower extremity cellulitis  Patient states he has had several episodes of cellulitis in his right leg on and off since 2019  He is typically managed by his PCP and reports improvement in symptoms with a course of p o  Keflex  Patient was recently evaluated by his PCP on 5/11 and prescribed Keflex 500 mg every 8 hours for 14 days in addition to oxycodone  Patient states symptoms usually begin with headache, chills and flulike illness  States he felt this way last Wednesday and subsequently woke up on Thursday with a beefy red right lower extremity  Patient reports fevers up to 101 °F at home  He denies history of MRSA though he works at [employer] as a [employer]  he denies being outside doing outside activities or sports  No recent tick exposure  No wounds or weeping of the right lower extremity but does have cracks in the skin of the heel and between the toes which he attributes to athlete's foot  Denies known history of Achilles tendon deformity  No prior history of surgical procedures on the right lower extremity  No recent wounds of the right lower extremity or foot  States most of his pain is in the calf and states his extremity is more swollen than baseline  Denies known history of lymphedema but is morbidly obese and has some degree of chronic leg swelling  At baseline takes HCTZ for blood pressure      Infectious disease is being consulted for diagnostic work up and antibiotic management  Review of Systems   Constitutional: Positive for chills and fever  HENT: Negative for congestion  Respiratory: Negative for shortness of breath  Cardiovascular: Negative for chest pain  Gastrointestinal: Negative for abdominal pain  Genitourinary: Negative for dysuria  Musculoskeletal: Positive for myalgias  Skin: Positive for color change and rash  Negative for wound  Allergic/Immunologic: Negative for immunocompromised state  Neurological: Positive for dizziness, weakness and headaches  Psychiatric/Behavioral: Negative for confusion  Pertinent positives and negatives as noted in HPI  Rest complete 12 point system-based review of systems is otherwise negative  PAST MEDICAL HISTORY:  Past Medical History:   Diagnosis Date   • Hypertension      History reviewed  No pertinent surgical history  FAMILY HISTORY:  Non-contributory    SOCIAL HISTORY:  Social History   Single  Social History     Substance and Sexual Activity   Alcohol Use Not Currently     Social History     Substance and Sexual Activity   Drug Use Not Currently     Social History     Tobacco Use   Smoking Status Never   Smokeless Tobacco Never       ALLERGIES:  No Known Allergies    MEDICATIONS:  All current active medications have been reviewed      Physical Exam     Temp:  [97 7 °F (36 5 °C)-98 1 °F (36 7 °C)] 98 1 °F (36 7 °C)  HR:  [77-87] 87  Resp:  [17-22] 22  BP: (135-147)/() 147/87  SpO2:  [93 %-98 %] 93 %  Temp (24hrs), Av 9 °F (36 6 °C), Min:97 7 °F (36 5 °C), Max:98 1 °F (36 7 °C)  Current: Temperature: 98 1 °F (36 7 °C)    Intake/Output Summary (Last 24 hours) at 2023 1112  Last data filed at 5/15/2023 1729  Gross per 24 hour   Intake 770 ml   Output --   Net 770 ml         Physical exam findings reported by bedside and primary medical team staff    General Appearance:  Appearing well, nontoxic, and in no distress, appears stated age, sitting upright at edge of bed Head:  Normocephalic, without obvious abnormality, atraumatic   Eyes:  PERRL, conjunctiva pink and sclera anicteric, both eyes   Nose: Nares normal, mucosa normal, no drainage   Throat: Oropharynx moist without lesions; lips, mucosa, and tongue normal; teeth and gums normal   Neck: Supple, symmetrical, trachea midline, no adenopathy, no tenderness/mass/nodules   Back:   Symmetric, no curvature, ROM normal, no CVA tenderness   Lungs:   Respirations unlabored on room air    Heart:  Regular rate and rhythm, S1, S2 normal, no murmur, rub or gallop   Abdomen:   Soft, non-tender, obese, non-distended, positive bowel sounds, no masses, no organomegaly   Extremities: Extremities normal, atraumatic, no cyanosis, clubbing; b/l LE ankle/pedal edema with edema noted LLE>R  LE hanging over edge of bed  Skin: RLE well demarcated, circumferential beefy red erythema between knee and ankle  No drainage wounds noted  Dry cracked feet/heels  TTP L calf  Neurologic: Alert and oriented times 3       Invasive Devices:   Peripheral IV 05/13/23 Dorsal (posterior); Left Forearm (Active)   Site Assessment WDL 05/15/23 0700   Dressing Type Transparent 05/15/23 0700   Line Status Flushed;Saline locked 05/15/23 0700   Dressing Status Clean;Dry; Intact 05/15/23 0700   Dressing Change Due 05/17/23 05/15/23 0700   Reason Not Rotated Not due 05/15/23 0700       Labs, Imaging, & Other Studies     Lab Results:    I have personally reviewed pertinent labs      Results from last 7 days   Lab Units 05/15/23  0525 05/14/23  0516 05/13/23  1123   WBC Thousand/uL 6 67 7 51 11 01*   HEMOGLOBIN g/dL 14 7 14 5 15 8   PLATELETS Thousands/uL 255 234 219     Results from last 7 days   Lab Units 05/15/23  0525 05/14/23  0516 05/13/23  1123   POTASSIUM mmol/L 3 7   < > 4 0   CHLORIDE mmol/L 101   < > 99   CO2 mmol/L 33*   < > 30   BUN mg/dL 12   < > 11   CREATININE mg/dL 0 83   < > 0 93   EGFR ml/min/1 73sq m 107   < > 99   CALCIUM mg/dL 9 0   < > 9 5   AST U/L  --   --  23   ALT U/L  --   --  24   ALK PHOS U/L  --   --  114*    < > = values in this interval not displayed  Results from last 7 days   Lab Units 05/13/23  1125 05/13/23  1123   BLOOD CULTURE  No Growth at 48 hrs  No Growth at 48 hrs  Imaging Studies:   I have personally reviewed pertinent imaging study reports and images in PACS  EKG, Pathology, and Other Studies:   I have personally reviewed pertinent reports and reviewed external records  Counseling/Coordination of care: Total 70 minutes communication with the patient via telehealth  Labs, medical tests and imaging studies were independently and extensively reviewed by me as noted above in HPI and old records were obtained and summarized as noted above in HPI  My recommendations were discussed with the patient in detail who verbalized understanding

## 2023-05-16 NOTE — ASSESSMENT & PLAN NOTE
History of fatty liver  Elevated bilirubin which is chronic  Outpatient follow-up  Recommended dietary modification and weight loss  Left patient [phone] message to call with status of ophth order from 2/17/17.    9/27/17 Left patient [phone] message to call with status of ophth order from 2/17/17.        Electronically signed by:Johanna Vazquez   Sep 27 2017 12:24PM CST Co-author       Electronically signed by:Laura Ayala   Sep 29 2017  9:10AM CST Administrative     AMENDMENTS:  1. mailed certified letter    Electronically signed by:Johanna Vazquez   Oct  3 2017  9:08AM CST Co-author     Electronically signed by:SAMIR URIBE M.D.  Oct  3 2017 11:30AM CST

## 2023-05-16 NOTE — PLAN OF CARE
Problem: Potential for Falls  Goal: Patient will remain free of falls  Description: INTERVENTIONS:  - Educate patient/family on patient safety including physical limitations  - Instruct patient to call for assistance with activity   - Consult OT/PT to assist with strengthening/mobility   - Keep Call bell within reach  - Keep bed low and locked with side rails adjusted as appropriate  - Keep care items and personal belongings within reach  - Initiate and maintain comfort rounds  - Make Fall Risk Sign visible to staff  - Offer Toileting every    Hours, in advance of need  - Initiate/Maintain   alarm  - Obtain necessary fall risk management equipment:     - Apply yellow socks and bracelet for high fall risk patients  - Consider moving patient to room near nurses station  Outcome: Progressing     Problem: PAIN - ADULT  Goal: Verbalizes/displays adequate comfort level or baseline comfort level  Description: Interventions:  - Encourage patient to monitor pain and request assistance  - Assess pain using appropriate pain scale  - Administer analgesics based on type and severity of pain and evaluate response  - Implement non-pharmacological measures as appropriate and evaluate response  - Consider cultural and social influences on pain and pain management  - Notify physician/advanced practitioner if interventions unsuccessful or patient reports new pain  Outcome: Progressing     Problem: INFECTION - ADULT  Goal: Absence or prevention of progression during hospitalization  Description: INTERVENTIONS:  - Assess and monitor for signs and symptoms of infection  - Monitor lab/diagnostic results  - Monitor all insertion sites, i e  indwelling lines, tubes, and drains  - Monitor endotracheal if appropriate and nasal secretions for changes in amount and color  - [place] appropriate cooling/warming therapies per order  - Administer medications as ordered  - Instruct and encourage patient and family to use good hand hygiene technique  - Identify and instruct in appropriate isolation precautions for identified infection/condition  Outcome: Progressing  Goal: Absence of fever/infection during neutropenic period  Description: INTERVENTIONS:  - Monitor WBC    Outcome: Progressing     Problem: SAFETY ADULT  Goal: Patient will remain free of falls  Description: INTERVENTIONS:  - Educate patient/family on patient safety including physical limitations  - Instruct patient to call for assistance with activity   - Consult OT/PT to assist with strengthening/mobility   - Keep Call bell within reach  - Keep bed low and locked with side rails adjusted as appropriate  - Keep care items and personal belongings within reach  - Initiate and maintain comfort rounds  - Make Fall Risk Sign visible to staff  - Offer Toileting every    Hours, in advance of need  - Initiate/Maintain   alarm  - Obtain necessary fall risk management equipment:     - Apply yellow socks and bracelet for high fall risk patients  - Consider moving patient to room near nurses station  Outcome: Progressing  Goal: Maintain or return to baseline ADL function  Description: INTERVENTIONS:  -  Assess patient's ability to carry out ADLs; assess patient's baseline for ADL function and identify physical deficits which impact ability to perform ADLs (bathing, care of mouth/teeth, toileting, grooming, dressing, etc )  - Assess/evaluate cause of self-care deficits   - Assess range of motion  - Assess patient's mobility; develop plan if impaired  - Assess patient's need for assistive devices and provide as appropriate  - Encourage maximum independence but intervene and supervise when necessary  - Involve family in performance of ADLs  - Assess for home care needs following discharge   - Consider OT consult to assist with ADL evaluation and planning for discharge  - Provide patient education as appropriate  Outcome: Progressing  Goal: Maintains/Returns to pre admission functional level  Description: INTERVENTIONS:  - Perform BMAT or MOVE assessment daily    - Set and communicate daily mobility goal to care team and patient/family/caregiver  - Collaborate with rehabilitation services on mobility goals if consulted  - Perform Range of Motion            times a day  - Reposition patient every    hours  - Dangle patient    times a day  - Stand patient    times a day  - Ambulate patient    times a day  - Out of bed to chair    times a day   - Out of bed for meals    times a day  - Out of bed for toileting  - Record patient progress and toleration of activity level   Outcome: Progressing     Problem: DISCHARGE PLANNING  Goal: Discharge to home or other facility with appropriate resources  Description: INTERVENTIONS:  - Identify barriers to discharge w/patient and caregiver  - Arrange for needed discharge resources and transportation as appropriate  - Identify discharge learning needs (meds, wound care, etc )  - Arrange for interpretive services to assist at discharge as needed  - Refer to Case Management Department for coordinating discharge planning if the patient needs post-hospital services based on physician/advanced practitioner order or complex needs related to functional status, cognitive ability, or social support system  Outcome: Progressing     Problem: Knowledge Deficit  Goal: Patient/family/caregiver demonstrates understanding of disease process, treatment plan, medications, and discharge instructions  Description: Complete learning assessment and assess knowledge base    Interventions:  - Provide teaching at level of understanding  - Provide teaching via preferred learning methods  Outcome: Progressing

## 2023-05-16 NOTE — UTILIZATION REVIEW
Continued Stay Review    Date: 5/15/2023                          Current Patient Class: inpatient Current Level of Care: medical    HPI:43 y o  male initially admitted on 5/13 with RLE cellulitis    Assessment/Plan: Improved erythema and decreased swelling but still with significant induration and still c/o significant pain  Continue with IV antibiotics and follow-up on blood cultures  Leukocytosis has resolved  Continue po meds         Vital Signs:   Date/Time Temp Pulse Resp BP MAP (mmHg) SpO2 O2 Device Patient Position - Orthostatic VS   05/15/23 22:57:37 -- 84 17 140/92 108 96 % None (Room air) Lying   05/15/23 1925 -- -- -- -- -- 97 % None (Room air) --   05/15/23 14:38:36 97 7 °F (36 5 °C) 82 18 140/89 106 98 % -- --   05/15/23 0730 -- -- -- -- -- 97 % None (Room air) --   05/15/23 07:23:20 -- 80 -- -- -- 97 % -- --   05/15/23 07:10:35 -- -- 20 141/85 104 -- -- --   05/14/23 23:45:54 98 1 °F (36 7 °C) 95 20 133/83 100 96 % -- --   05/14/23 1916 -- -- -- -- -- -- None (Room air) --   05/14/23 16:06:29 -- 84 -- 154/99 117 96 % -- --   05/14/23 0755 -- -- -- -- -- 97 % None (Room air) --   05/14/23 07:17:39 97 7 °F (36 5 °C) 76 16 121/81 94 97 % -- --       Pertinent Labs/Diagnostic Results:     Results from last 7 days   Lab Units 05/15/23  0525 05/14/23  0516 05/13/23  1123   WBC Thousand/uL 6 67 7 51 11 01*   HEMOGLOBIN g/dL 14 7 14 5 15 8   HEMATOCRIT % 45 2 44 1 47 9   PLATELETS Thousands/uL 255 234 219   NEUTROS ABS Thousands/µL 3 53 4 30 8 30*     Results from last 7 days   Lab Units 05/15/23  0525 05/14/23  0516 05/13/23  1123   SODIUM mmol/L 139 139 137   POTASSIUM mmol/L 3 7 3 2* 4 0   CHLORIDE mmol/L 101 101 99   CO2 mmol/L 33* 32 30   ANION GAP mmol/L 5 6 8   BUN mg/dL 12 11 11   CREATININE mg/dL 0 83 0 91 0 93   EGFR ml/min/1 73sq m 107 102 99   CALCIUM mg/dL 9 0 8 9 9 5       Results from last 7 days   Lab Units 05/15/23  0525 05/14/23  0516 05/13/23  1123   GLUCOSE RANDOM mg/dL 99 97 114 Results from last 7 days   Lab Units 05/13/23  1125 05/13/23  1123   BLOOD CULTURE  No Growth at 48 hrs  No Growth at 48 hrs  Medications:   Scheduled Medications:  acetaminophen, 975 mg, Oral, Q8H TRINI  cefazolin, 2,000 mg, Intravenous, Q6H  enoxaparin, 60 mg, Subcutaneous, BID  lisinopril, 10 mg, Oral, Daily    PRN Meds:  hydrALAZINE, 5 mg, Intravenous, Q6H PRN  morphine injection, 2 mg, Intravenous, Q6H PRN 5/14 x2, 5/15 x2  oxyCODONE, 5 mg, Oral, Q4H PRN 5/14 x3, 5/15 x3        Discharge Plan: D    Network Utilization Review Department  ATTENTION: Please call with any questions or concerns to [phone] and carefully listen to the prompts so that you are directed to the right person  All voicemails are confidential   Juan Miguel Wright all requests for admission clinical reviews, approved or denied determinations and any other requests to dedicated fax number below belonging to the campus where the patient is receiving treatment   List of dedicated fax numbers for the Facilities:  [address] DENIALS (Administrative/Medical Necessity) [phone]   [address] (Maternity/NICU/Pediatrics) [phone]   [address] [phone]   Amari Humphreys [address] [phone]   [address] [address] [facility] [phone]   [address] Renee Alonzo [facility] 134 815 [facility] [phone]

## 2023-05-16 NOTE — ASSESSMENT & PLAN NOTE
· Patient presents with increased erythema, swelling and pain in the right lower extremity  · Saw his PCP 2 days prior to admission and was started on Keflex but continued to have fever and worsening redness and swelling despite being on antibiotics for 2 days  · Venous Doppler ultrasound negative for DVT  · Denies any trauma  · No prior history of MRSA  · Received ceftriaxone and vancomycin in the emergency room  · Continue high-dose Ancef  Continue with serial exams/neurovascular checks  Improved erythema and decreased swelling but still with significant induration  Continue with IV antibiotics and follow-up on blood cultures which were negative leukocytosis has resolved  He still has pain he does have significant swelling and induration in the calf performed CT of the lower extremity with a normal abscess just some edema  We will ask infectious disease to evaluate as well is still with significant pain and edema to see if any antibiotics need to be added or changed    Also asked orthopedics to evaluate as there is a questionable Achillis tendon tendinitis versus rupture

## 2023-05-16 NOTE — CONSULTS
Consultation - Orthopedics   Mathias Goldmann 39 y o  male MRN: [id number]  Unit/Bed#: -01 Encounter: [id number]      Assessment/Plan     Assessment:  Achilles tendinitis/tendinosis with overlying cellulitis right lower extremity; obesity  Plan: At this time, I would not recommend any specific treatment  Outpatient physical therapy for a stretching program and possible nighttime splinting might be reasonable  He has a chronic history of symptoms without acute change indicating at least a 2-year history of posterior heel pain  He denies any change in symptoms recently  Once discharged, and his cellulitis resolved, he can be seen as an outpatient by myself or podiatry  Ultimately, if he does not respond to conservative measures, surgical intervention may be warranted  History of Present Illness   Physician Requesting Consult: Shiva Main MD  Reason for Consult / Principal Problem: Achilles tendinitis/tendinosis  HPI: Mathias Goldmann is a 39y o  year old male who presents with admission for cellulitis of the right lower extremity  He states that he had an initial episode of cellulitis of this right lower extremity in December 2019 and this is his fourth episode, second requiring admission  He noted initial onset of pain and swelling in the right lower extremity with associated fever as high as 101 °F on 5/11/2023  He was initially seen by his primary care physician and placed on oral antibiotics  With the lack of response, he was seen and then admitted at [facility] where he has been since 5/13/2023  A recent CT scan was obtained demonstrating findings suggestive of Achilles tendinitis/tendinosis with possible partial tear and orthopedic consultation was requested  He admits to a long history of posterior ankle/heel pain dating back at least to 2 years  He has never sought any kind of medical care  He denies pain at rest but notes pain within 1/2-hour of beginning ambulation or activity  "He denies any injury that he can recall  Despite the symptoms, he has remained active as a  at the [facility] senior care  Consults    Review of Systems: Patient's 10 organ system review is negative excluding the chief complaint and as is consistent with his past medical history  Historical Information   Past Medical History:   Diagnosis Date   • Hypertension      History reviewed  No pertinent surgical history  Social History   Social History     Substance and Sexual Activity   Alcohol Use Not Currently     Social History     Substance and Sexual Activity   Drug Use Not Currently     E-Cigarette/Vaping   • E-Cigarette Use Never User      E-Cigarette/Vaping Substances     Social History     Tobacco Use   Smoking Status Never   Smokeless Tobacco Never     Family History: non-contributory    Meds/Allergies   all current active meds have been reviewed  No Known Allergies    Objective   Vitals: Blood pressure 147/87, pulse 87, temperature 98 1 °F (36 7 °C), resp  rate 22, height 5' 9\" (1 753 m), weight (!) 163 kg (360 lb), SpO2 93 %  ,Body mass index is 53 16 kg/m²  Intake/Output Summary (Last 24 hours) at 5/16/2023 1335  Last data filed at 5/15/2023 1729  Gross per 24 hour   Intake 720 ml   Output --   Net 720 ml     I/O last 24 hours: In: 1160 [P O :1110; IV Piggyback:50]  Out: -     Invasive Devices     Peripheral Intravenous Line  Duration           Peripheral IV 05/13/23 Dorsal (posterior); Left Forearm 3 days                Physical Exam: Beatris Morales is alert, oriented and seems to be in no acute distress  HEENT exam is grossly benign  Heart regular, pulses palpable  Lungs clear  Abdomen soft and nontender  Skin without lacerations or abrasions  Motor and sensory exams grossly intact  Ortho Exam: The patient's right lower extremity is somewhat swollen with minimal erythema  Compression of calf compartments demonstrates no increased tension    He does complain of tenderness to palpation of the " Achilles tendon insertion at the calcaneus and there is a mild prominence noted on the posterior calcaneus although this is also noted on the left  He has mild tenderness to palpation of the Achilles tendon from the medial/lateral aspect but no tenderness posteriorly and there is no palpable defect  He has no complaints with active dorsiflexion, plantarflexion, inversion or eversion and no complaints during resisted dorsiflexion or plantarflexion of the ankle  He denies any complaints with passive dorsiflexion of the ankle  The remainder of the right lower extremity examination is grossly benign  Imaging Studies: CT scan of his leg demonstrated no evidence of abscess formation  X-rays of his tibia/fibula from December 2019 demonstrate a thickening of the Gillies tendon insertion at the calcaneus with a enthesophyte forming  EKG, Pathology, and Other Studies: I have personally reviewed pertinent reports

## 2023-05-17 LAB
ANION GAP SERPL CALCULATED.3IONS-SCNC: 6 MMOL/L (ref 4–13)
BUN SERPL-MCNC: 11 MG/DL (ref 5–25)
CALCIUM SERPL-MCNC: 8.9 MG/DL (ref 8.4–10.2)
CHLORIDE SERPL-SCNC: 102 MMOL/L (ref 96–108)
CO2 SERPL-SCNC: 30 MMOL/L (ref 21–32)
CREAT SERPL-MCNC: 0.74 MG/DL (ref 0.6–1.3)
GFR SERPL CREATININE-BSD FRML MDRD: 112 ML/MIN/1.73SQ M
GLUCOSE SERPL-MCNC: 102 MG/DL (ref 65–140)
POTASSIUM SERPL-SCNC: 4 MMOL/L (ref 3.5–5.3)
SODIUM SERPL-SCNC: 138 MMOL/L (ref 135–147)

## 2023-05-17 RX ORDER — FUROSEMIDE 10 MG/ML
20 INJECTION INTRAMUSCULAR; INTRAVENOUS ONCE
Status: COMPLETED | OUTPATIENT
Start: 2023-05-17 | End: 2023-05-17

## 2023-05-17 RX ORDER — CLOTRIMAZOLE 1 %
CREAM (GRAM) TOPICAL 2 TIMES DAILY
Status: DISCONTINUED | OUTPATIENT
Start: 2023-05-17 | End: 2023-05-18 | Stop reason: HOSPADM

## 2023-05-17 RX ORDER — LISINOPRIL 20 MG/1
20 TABLET ORAL DAILY
Status: DISCONTINUED | OUTPATIENT
Start: 2023-05-18 | End: 2023-05-18 | Stop reason: HOSPADM

## 2023-05-17 RX ADMIN — ACETAMINOPHEN 975 MG: 325 TABLET ORAL at 13:41

## 2023-05-17 RX ADMIN — FUROSEMIDE 20 MG: 10 INJECTION, SOLUTION INTRAVENOUS at 18:38

## 2023-05-17 RX ADMIN — CEFAZOLIN SODIUM 2000 MG: 2 SOLUTION INTRAVENOUS at 00:17

## 2023-05-17 RX ADMIN — ACETAMINOPHEN 975 MG: 325 TABLET ORAL at 21:00

## 2023-05-17 RX ADMIN — CLOTRIMAZOLE 1 APPLICATION.: 1 CREAM TOPICAL at 18:39

## 2023-05-17 RX ADMIN — CLOTRIMAZOLE 1 APPLICATION.: 1 CREAM TOPICAL at 08:33

## 2023-05-17 RX ADMIN — FUROSEMIDE 20 MG: 10 INJECTION, SOLUTION INTRAVENOUS at 08:30

## 2023-05-17 RX ADMIN — OXYCODONE HYDROCHLORIDE 5 MG: 5 TABLET ORAL at 08:30

## 2023-05-17 RX ADMIN — CEFAZOLIN SODIUM 2000 MG: 2 SOLUTION INTRAVENOUS at 13:40

## 2023-05-17 RX ADMIN — CEFAZOLIN SODIUM 2000 MG: 2 SOLUTION INTRAVENOUS at 18:38

## 2023-05-17 RX ADMIN — ENOXAPARIN SODIUM 60 MG: 60 INJECTION SUBCUTANEOUS at 13:41

## 2023-05-17 RX ADMIN — ACETAMINOPHEN 975 MG: 325 TABLET ORAL at 05:30

## 2023-05-17 RX ADMIN — CEFAZOLIN SODIUM 2000 MG: 2 SOLUTION INTRAVENOUS at 05:30

## 2023-05-17 RX ADMIN — ENOXAPARIN SODIUM 60 MG: 60 INJECTION SUBCUTANEOUS at 00:16

## 2023-05-17 RX ADMIN — OXYCODONE HYDROCHLORIDE 5 MG: 5 TABLET ORAL at 18:47

## 2023-05-17 RX ADMIN — LISINOPRIL 10 MG: 10 TABLET ORAL at 08:30

## 2023-05-17 NOTE — PROGRESS NOTES
Progress Note - Infectious Disease   Leno Vasquez 39 y o  male MRN: [id number]  Unit/Bed#: -01 Encounter: [id number]        REQUIRED DOCUMENTATION:     1  This service was provided via Telemedicine  2  Provider located at [facility] [place]  3  TeleMed provider: Ginger Ugalde MD   4  Identify all parties in room with patient during tele consult:RN  5  After connecting through [id number], patient was identified by name and date of birth and assistant checked wristband  Patient was then informed that this was a Telemedicine visit and that the exam was being conducted confidentially over secure lines  My office door was closed  No one else was in the room  Patient acknowledged consent and understanding of privacy and security of the Telemedicine visit, and gave us permission to have the assistant stay in the room in order to assist with the history and to conduct the exam   I informed the patient that I have reviewed their record in Epic and presented the opportunity for them to ask any questions regarding the visit today  The patient agreed to participate  Assessment/Recommendations     1  Right lower extremity cellulitis, recurrent  - in the setting of morbid obesity, chronic edema and untreated tinea pedis, dry skin  - Ct neg for abscess, Duplex negative for DVT  - Afebrile without leukocytosis, anticipate slow improvement due to extensive edema/poor antibiotic tissue absorption     · Continue cefazolin 2 every 6  · Continue topical clotrimazole  · Continue as needed diuretics, lower extremity skin care, compression stockings, limb elevation  · Recommend weight loss  · Anticipate dc tomorrow on Keflex 1 g 4 times daily to complete 10 of therapy through 5/23  · Discussed natural history of cellulitis and discussed with patient that he is at risk for recurrent cellulitis/bacteremia if underlying risk factors cannot be modified    2    Achilles tendinitis/tendinosis    · Conservative treatment at present, outpatient follow-up with Ortho    3  Morbid obesity, chronic venous edema  -Risk factor for recurrent infection    · Recommend lifestyle modification and weight loss as outpatient    4  Tinea pedis    · Topical antifungals as above, keep feet clean and dry and wear light well ventilated footwear    Discussed in detail with the primary service  History       Subjective: The patient has no complaints  Notes much improvement in redness but still has significant swelling in right leg, pain is improving  Denies fevers, chills, or sweats  Denies nausea, vomiting, or diarrhea  Antibiotics:  Cefazolin      Physical Exam     Temp:  [97 7 °F (36 5 °C)-98 1 °F (36 7 °C)] 97 7 °F (36 5 °C)  HR:  [74-87] 83  Resp:  [16-18] 16  BP: (139-160)/() 160/100  SpO2:  [93 %-98 %] 96 %  Temp (24hrs), Av 9 °F (36 6 °C), Min:97 7 °F (36 5 °C), Max:98 1 °F (36 7 °C)  Current: Temperature: 97 7 °F (36 5 °C)    Intake/Output Summary (Last 24 hours) at 2023 0441  Last data filed at 2023 5229  Gross per 24 hour   Intake 460 ml   Output --   Net 460 ml       Physical exam findings reported by bedside and primary medical team staff      General Appearance:  Appearing well, morbidly obese nontoxic, and in no distress, appears stated age   Lungs:   Clear to auscultation bilaterally, no audible wheezes, rhonchi and rales, respirations unlabored   Heart:  Regular rate and rhythm, S1, S2 normal, no murmur, rub or gallop   Abdomen:   Soft, non-tender, non-distended, positive bowel sounds, no masses, no organomegaly    No CVA tenderness   Extremities: Extremities normal, atraumatic, no cyanosis, clubbing     2+ right lower extremity edema   Skin: Skin color, texture, turgor normal, right lower extremity erythema without purulence or open wounds, dry cracked skin over both heels   Neurologic: Alert and oriented times 3,         Invasive Devices:   Peripheral IV 23 Right Antecubital (Active)   Site Assessment WDL;Clean; Intact;Dry 05/16/23 2040   Dressing Type Transparent 05/16/23 2040   Line Status Blood return noted; Flushed & Clamped 05/16/23 2040   Dressing Status Dry; Intact 05/16/23 2040       Labs, Imaging, & Other Studies     Lab Results:    I have personally reviewed pertinent labs  Results from last 7 days   Lab Units 05/15/23  0525 05/14/23  0516 05/13/23  1123   WBC Thousand/uL 6 67 7 51 11 01*   HEMOGLOBIN g/dL 14 7 14 5 15 8   PLATELETS Thousands/uL 255 234 219     Results from last 7 days   Lab Units 05/17/23  0535 05/14/23  0516 05/13/23  1123   POTASSIUM mmol/L 4 0   < > 4 0   CHLORIDE mmol/L 102   < > 99   CO2 mmol/L 30   < > 30   BUN mg/dL 11   < > 11   CREATININE mg/dL 0 74   < > 0 93   EGFR ml/min/1 73sq m 112   < > 99   CALCIUM mg/dL 8 9   < > 9 5   AST U/L  --   --  23   ALT U/L  --   --  24   ALK PHOS U/L  --   --  114*    < > = values in this interval not displayed  Results from last 7 days   Lab Units 05/13/23  1125 05/13/23  1123   BLOOD CULTURE  No Growth at 72 hrs  No Growth at 72 hrs  Imaging Studies:   I have personally reviewed pertinent imaging study reports and images in PACS  EKG, Pathology, and Other Studies:   I have personally reviewed pertinent reports  Counseling/Coordination of care: Total 35 minutes communication with the patient via telehealth  Labs, medical tests and imaging studies were independently reviewed by me as noted above

## 2023-05-17 NOTE — PROGRESS NOTES
[address]  Progress Note  Name: Yuriy Calderon  MRN: [id number]  Unit/Bed#: -01 I Date of Admission: 5/13/2023   Date of Service: 5/17/2023 I Hospital Day: 4    Assessment/Plan   * Cellulitis of right lower extremity  Assessment & Plan  · Patient presents with increased erythema, swelling and pain in the right lower extremity  · Saw his PCP 2 days prior to admission and was started on Keflex but continued to have fever and worsening redness and swelling despite being on antibiotics for 2 days  · Venous Doppler ultrasound negative for DVT  · Denies any trauma  · No prior history of MRSA  · Received ceftriaxone and vancomycin in the emergency room  · Continue high-dose Ancef yesterday raised to 2 g IV every 6 hours  Pain continues to improve its more soft calf  Continue with serial exams/neurovascular checks  Improved erythema and decreased swelling but still with significant induration  Continue with IV antibiotics and follow-up on blood cultures which were negative leukocytosis has resolved  He still has pain he does have significant swelling and induration in the calf performed CT of the lower extremity with a normal abscess just some edema  Reviewed infectious disease input and also orthopedics in terms of the Achilles tendon tendinitis/possible option for that he will follow outpatient with Ortho   will give him Lasix 20 mg IV x2 with benefit to be on Lasix 20 mg daily to keep his edema down instead of hydrochlorothiazide  · Swelling continues to improve as discussed with infectious disease today anticipate discharge on high-dose Keflex in 24 hours till 5/23    Leukocytosis  Assessment & Plan  Present admission secondary to right lower extremity cellulitis  Does not meet any SIRS criteria  Continue to trend CBC and temperature curve closely  Leukocytosis resolved   continue with current antibiotic    Morbid obesity ([address] )  Assessment & Plan  BMI 53 1    Nutrition therapeutic lifestyle modification recommended  Essential hypertension  Assessment & Plan  Maintained on lisinopril and hydrochlorothiazide  Blood pressure today as hydrochlorothiazide stopped we will switch him to outpatient Lasix 20 mg daily increase his lisinopril to 20 mg daily     Elevated bilirubin  Assessment & Plan  History of fatty liver  Elevated bilirubin which is chronic  Outpatient follow-up  Recommended dietary modification and weight loss  Hypokalemia  Assessment & Plan  resolved            VTE Pharmacologic Prophylaxis: VTE Score: 5 High Risk (Score >/= 5) - Pharmacological DVT Prophylaxis Ordered: enoxaparin (Lovenox)  Sequential Compression Devices Ordered  Patient Centered Rounds: I performed bedside rounds with nursing staff today  Discussions with Specialists or Other Care Team Provider: ID    Education and Discussions with Family / Patient: Patient declined call to   Total Time Spent on Date of Encounter in care of patient: 35 minutes This time was spent on one or more of the following: performing physical exam; counseling and coordination of care; obtaining or reviewing history; documenting in the medical record; reviewing/ordering tests, medications or procedures; communicating with other healthcare professionals and discussing with patient's family/caregivers  Current Length of Stay: 4 day(s)  Current Patient Status: Inpatient   Certification Statement: The patient will continue to require additional inpatient hospital stay due to Right lower extremity cellulitis  Discharge Plan: Anticipate discharge tomorrow to home      Code Status: Level 1 - Full Code    Subjective:   Patient seen and examined the pain is Continues to improve from admission down to a 5 out of 10 he is able to ambulate    Objective:     Vitals:   Temp (24hrs), Av 9 °F (36 6 °C), Min:97 7 °F (36 5 °C), Max:98 1 °F (36 7 °C)    Temp:  [97 7 °F (36 5 °C)-98 1 °F (36 7 °C)] 97 7 °F (36 5 °C)  HR:  [74-83] 83  Resp:  [16-18] 16  BP: (139-160)/() 160/100  SpO2:  [96 %-98 %] 96 %  Body mass index is 53 16 kg/m²  Input and Output Summary (last 24 hours): Intake/Output Summary (Last 24 hours) at 5/17/2023 1011  Last data filed at 5/17/2023 0900  Gross per 24 hour   Intake 820 ml   Output --   Net 820 ml       Physical Exam:   Physical Exam  Vitals and nursing note reviewed  Constitutional:       General: He is not in acute distress  Appearance: He is well-developed  He is obese  HENT:      Head: Normocephalic and atraumatic  Eyes:      Conjunctiva/sclera: Conjunctivae normal    Cardiovascular:      Rate and Rhythm: Normal rate and regular rhythm  Heart sounds: No murmur heard  Pulmonary:      Effort: Pulmonary effort is normal  No respiratory distress  Breath sounds: Normal breath sounds  Abdominal:      Palpations: Abdomen is soft  Tenderness: There is no abdominal tenderness  Musculoskeletal:         General: Swelling present  Cervical back: Neck supple  Comments: Some mild tenderness of the right calf better than yesterday it is soft is not as tense there is dark discoloration    Skin:     General: Skin is warm and dry  Capillary Refill: Capillary refill takes less than 2 seconds  Neurological:      Mental Status: He is alert and oriented to person, place, and time  Mental status is at baseline     Psychiatric:         Mood and Affect: Mood normal          Additional Data:     Labs:  Results from last 7 days   Lab Units 05/15/23  0525   WBC Thousand/uL 6 67   HEMOGLOBIN g/dL 14 7   HEMATOCRIT % 45 2   PLATELETS Thousands/uL 255   NEUTROS PCT % 53   LYMPHS PCT % 33   MONOS PCT % 9   EOS PCT % 5     Results from last 7 days   Lab Units 05/17/23  0535 05/14/23  0516 05/13/23  1123   SODIUM mmol/L 138   < > 137   POTASSIUM mmol/L 4 0   < > 4 0   CHLORIDE mmol/L 102   < > 99   CO2 mmol/L 30   < > 30   BUN mg/dL 11   < > 11   CREATININE mg/dL 0 74 < > 0 93   ANION GAP mmol/L 6   < > 8   CALCIUM mg/dL 8 9   < > 9 5   ALBUMIN g/dL  --   --  3 9   TOTAL BILIRUBIN mg/dL  --   --  1 53*   ALK PHOS U/L  --   --  114*   ALT U/L  --   --  24   AST U/L  --   --  23   GLUCOSE RANDOM mg/dL 102   < > 114    < > = values in this interval not displayed  Results from last 7 days   Lab Units 05/13/23  1123   INR  0 98             Results from last 7 days   Lab Units 05/13/23  1123   LACTIC ACID mmol/L 0 8       Lines/Drains:  Invasive Devices     Peripheral Intravenous Line  Duration           Peripheral IV 05/16/23 Right Antecubital <1 day                      Imaging: Reviewed radiology reports from this admission including: ultrasound(s)    Recent Cultures (last 7 days):   Results from last 7 days   Lab Units 05/13/23  1125 05/13/23  1123   BLOOD CULTURE  No Growth at 72 hrs  No Growth at 72 hrs  Last 24 Hours Medication List:   Current Facility-Administered Medications   Medication Dose Route Frequency Provider Last Rate   • acetaminophen  975 mg Oral [facility] Beltran Vidal MD     • cefazolin  2,000 mg Intravenous Q6H Raj Brooks PA-C Stopped (05/17/23 8911)   • clotrimazole   Topical BID Deshaun Wong MD     • enoxaparin  60 mg Subcutaneous BID Beltran Vidal MD     • furosemide  20 mg Intravenous Once Deshaun Wong MD     • hydrALAZINE  5 mg Intravenous Q6H PRN Beltran Vidal MD     • [START ON 5/18/2023] lisinopril  20 mg Oral Daily Deshaun Wong MD     • morphine injection  2 mg Intravenous Q6H PRN Beltran Vidal MD     • oxyCODONE  5 mg Oral Q4H PRN Beltran Vidal MD          Today, Patient Was Seen By: Deshaun Wong MD    **Please Note: This note may have been constructed using a voice recognition system  **

## 2023-05-17 NOTE — DISCHARGE SUMMARY
[address]  Discharge- Destiny Beal 1980, 39 y o  male MRN: [id number]  Unit/Bed#: -01 Encounter: [id number]  Primary Care Provider: KELLI Brown   Date and time admitted to hospital: 5/13/2023 11:01 AM    * Cellulitis of right lower extremity  Assessment & Plan  · Patient presents with increased erythema, swelling and pain in the right lower extremity  · Saw his PCP 2 days prior to admission and was started on Keflex but continued to have fever and worsening redness and swelling despite being on antibiotics for 2 days  · Venous Doppler ultrasound negative for DVT  · Denies any trauma  · No prior history of MRSA  · Received ceftriaxone and vancomycin in the emergency room  · Continue high-dose Ancef yesterday raised to 2 g IV every 6 hours  Pain continues to improve its more soft calf  Continue with serial exams/neurovascular checks  Improved erythema and decreased swelling but still with significant induration  Continue with IV antibiotics and follow-up on blood cultures which were negative leukocytosis has resolved  He still has pain he does have significant swelling and induration in the calf performed CT of the lower extremity with a normal abscess just some edema  Reviewed infectious disease input and also orthopedics in terms of the Achilles tendon tendinitis/possible option for that he will follow outpatient with Ortho  Leg edema improving the redness and the pain is improving  We will give another dose of Lasix 40 mg IV x1 on Lasix 20 mg daily to keep his edema down instead of hydrochlorothiazide  · DC on Keflex 1 g p o  4 times a day till May 23  Elevate the leg also discussed with him he may return to work next week after completing antibiotics on 524 and also to wear compression stockings  Leukocytosis  Assessment & Plan  Present admission secondary to right lower extremity cellulitis  Does not meet any SIRS criteria    Continue to trend CBC and temperature curve closely  Leukocytosis resolved   continue with current antibiotic    Morbid obesity ([address] )  Assessment & Plan  BMI 53 1  Nutrition therapeutic lifestyle modification recommended  Essential hypertension  Assessment & Plan  Maintained on lisinopril and hydrochlorothiazide  Blood pressure today as hydrochlorothiazide stopped we will switch him to outpatient Lasix 20 mg daily increase his lisinopril to 20 mg daily     Elevated bilirubin  Assessment & Plan  History of fatty liver  Elevated bilirubin which is chronic  Outpatient follow-up  Recommended dietary modification and weight loss  Hypokalemia  Assessment & Plan  resolved      Medical Problems     Resolved Problems  Date Reviewed: 5/17/2023   None       Discharging Physician / Practitioner: Odell Gitelman, MD  PCP: Paco Flores, [facility]  Admission Date:   Admission Orders (From admission, onward)     Ordered        05/13/23 1159  [facility],[address]  Once                      Discharge Date: 05/18/23    Consultations During Hospital Stay:  · Infectious disease  · Orthopedic surgery    Procedures Performed:   · None    Significant Findings / Test Results:   · Venous duplex is negative for acute DVT  CT of the lower extremity with contrast- Mild, diffuse nonspecific subcutaneous edema throughout the right lower leg  No focal fluid collections to suggest abscess      · There is marked thickening of the insertional Achilles tendon, consistent with severe tendinosis  Superimposed partial tear cannot be excluded   (Series 603 images 35-49 )    Incidental Findings:   · none    Test Results Pending at Discharge (will require follow up):   · none     Outpatient Tests Requested:  · none    Complications:  none    Reason for Admission: Right lower extremity cellulitis    Hospital Course:   Jitendra Gupta is a 39 y o  male patient who originally presented to the hospital on 5/13/2023 due to right lower extremity cellulitis failed outpatient "antibiotics on IV Ancef the started to improve but still had significant bleeding he had a CT done without any abscess there was a questionable Achilles tendinitis rupture evaluated by Ortho recommendation is to follow-up outpatient after resolution of cellulitis no abscess asked infectious disease to see there based Ancef 2 every 6 throughout the hospital stay patient continued to improve down to a 5 he is able to ambulate erythema continue to improve he does have underlying swelling at this which is hydrochlorothiazide to Lasix as an outpatient  Also placed his lisinopril for better blood pressure control his hydrochlorothiazide has been discontinued  His medical cleared to be discharged on Keflex high-dose per recommendation of infectious disease  Discharged on lisinopril 20 mg daily and Lasix 20 mg daily BMP in 1 week  To wear compression stockings while at work  Please see above list of diagnoses and related plan for additional information  Condition at Discharge: stable    Discharge Day Visit / Exam:   Subjective: Seen and examined right lower extremity redness and swelling has improved and the pain has improved vitals: Blood Pressure: 151/94 (05/18/23 0909)  Pulse: 76 (05/18/23 0712)  Temperature: 97 9 °F (36 6 °C) (05/17/23 2223)  Temp Source: Temporal (05/17/23 2101)  Respirations: 16 (05/18/23 0712)  Height: 5' 9\" (175 3 cm) (05/13/23 1104)  Weight - Scale: (!) 161 kg (355 lb 13 2 oz) (05/18/23 0600)  SpO2: 97 % (05/18/23 4350)  Exam:   Physical Exam  Vitals and nursing note reviewed  Constitutional:       General: He is not in acute distress  Appearance: He is well-developed  He is obese  HENT:      Head: Normocephalic and atraumatic  Eyes:      Conjunctiva/sclera: Conjunctivae normal    Cardiovascular:      Rate and Rhythm: Normal rate and regular rhythm  Heart sounds: No murmur heard  Pulmonary:      Effort: Pulmonary effort is normal  No respiratory distress        Breath " sounds: Normal breath sounds  Abdominal:      Palpations: Abdomen is soft  Tenderness: There is no abdominal tenderness  Musculoskeletal:         General: Swelling present  Cervical back: Neck supple  Comments: No tenderness in touch of the calf swelling compared to yesterday mildly improved although there is still some swelling  The erythema and discoloration has improved significantly  Skin:     General: Skin is warm and dry  Capillary Refill: Capillary refill takes less than 2 seconds  Neurological:      Mental Status: He is alert and oriented to person, place, and time  Psychiatric:         Mood and Affect: Mood normal          Discussion with Family: Patient declined call to   Discharge instructions/Information to patient and family:   See after visit summary for information provided to patient and family  Provisions for Follow-Up Care:  See after visit summary for information related to follow-up care and any pertinent home health orders  Disposition:   Home    Planned Readmission: no     Discharge Statement:  I spent >35 minutes discharging the patient  This time was spent on the day of discharge  I had direct contact with the patient on the day of discharge  Greater than 50% of the total time was spent examining patient, answering all patient questions, arranging and discussing plan of care with patient as well as directly providing post-discharge instructions  Additional time then spent on discharge activities  Discharge Medications:  See after visit summary for reconciled discharge medications provided to patient and/or family        **Please Note: This note may have been constructed using a voice recognition system**

## 2023-05-17 NOTE — PLAN OF CARE
Problem: Potential for Falls  Goal: Patient will remain free of falls  Description: INTERVENTIONS:  - Educate patient/family on patient safety including physical limitations  - Instruct patient to call for assistance with activity   - Consult OT/PT to assist with strengthening/mobility   - Keep Call bell within reach  - Keep bed low and locked with side rails adjusted as appropriate  - Keep care items and personal belongings within reach  - Initiate and maintain comfort rounds  - Make Fall Risk Sign visible to staff  - Apply yellow socks and bracelet for high fall risk patients  - Consider moving patient to room near nurses station  Outcome: Progressing     Problem: PAIN - ADULT  Goal: Verbalizes/displays adequate comfort level or baseline comfort level  Description: Interventions:  - Encourage patient to monitor pain and request assistance  - Assess pain using appropriate pain scale0-10  - Administer analgesics based on type and severity of pain and evaluate response  - Implement non-pharmacological measures as appropriate and evaluate response  - Consider cultural and social influences on pain and pain management  - Notify physician/advanced practitioner if interventions unsuccessful or patient reports new pain  Outcome: Progressing     Problem: INFECTION - ADULT  Goal: Absence or prevention of progression during hospitalization  Description: INTERVENTIONS:  - Assess and monitor for signs and symptoms of infection  - Monitor lab/diagnostic results  - Monitor all insertion sites, i e  indwelling lines, tubes, and drains  - Monitor endotracheal if appropriate and nasal secretions for changes in amount and color  - [place] appropriate cooling/warming therapies per order  - Administer medications as ordered  - Instruct and encourage patient and family to use good hand hygiene technique  - Identify and instruct in appropriate isolation precautions for identified infection/condition  Outcome: Progressing Problem: SAFETY ADULT  Goal: Patient will remain free of falls  Description: INTERVENTIONS:  - Educate patient/family on patient safety including physical limitations  - Instruct patient to call for assistance with activity   - Consult OT/PT to assist with strengthening/mobility   - Keep Call bell within reach  - Keep bed low and locked with side rails adjusted as appropriate  - Keep care items and personal belongings within reach  - Initiate and maintain comfort rounds  - Make Fall Risk Sign visible to staff  - Apply yellow socks and bracelet for high fall risk patients  - Consider moving patient to room near nurses station  Outcome: Progressing

## 2023-05-17 NOTE — PLAN OF CARE
Problem: Potential for Falls  Goal: Patient will remain free of falls  Description: INTERVENTIONS:  - Educate patient/family on patient safety including physical limitations  - Instruct patient to call for assistance with activity   - Consult OT/PT to assist with strengthening/mobility   - Keep Call bell within reach  - Keep bed low and locked with side rails adjusted as appropriate  - Keep care items and personal belongings within reach  - Initiate and maintain comfort rounds  - Make Fall Risk Sign visible to staff  - Apply yellow socks and bracelet for high fall risk patients  - Consider moving patient to room near nurses station  Outcome: Progressing     Problem: PAIN - ADULT  Goal: Verbalizes/displays adequate comfort level or baseline comfort level  Description: Interventions:  - Encourage patient to monitor pain and request assistance  - Assess pain using appropriate pain scale0-10  - Administer analgesics based on type and severity of pain and evaluate response  - Implement non-pharmacological measures as appropriate and evaluate response  - Consider cultural and social influences on pain and pain management  - Notify physician/advanced practitioner if interventions unsuccessful or patient reports new pain  Outcome: Progressing     Problem: INFECTION - ADULT  Goal: Absence or prevention of progression during hospitalization  Description: INTERVENTIONS:  - Assess and monitor for signs and symptoms of infection  - Monitor lab/diagnostic results  - Monitor all insertion sites, i e  indwelling lines, tubes, and drains  - Monitor endotracheal if appropriate and nasal secretions for changes in amount and color  - [place] appropriate cooling/warming therapies per order  - Administer medications as ordered  - Instruct and encourage patient and family to use good hand hygiene technique  - Identify and instruct in appropriate isolation precautions for identified infection/condition  Outcome: Progressing  Goal: Absence of fever/infection during neutropenic period  Description: INTERVENTIONS:  - Monitor WBC    Outcome: Progressing     Problem: SAFETY ADULT  Goal: Patient will remain free of falls  Description: INTERVENTIONS:  - Educate patient/family on patient safety including physical limitations  - Instruct patient to call for assistance with activity   - Consult OT/PT to assist with strengthening/mobility   - Keep Call bell within reach  - Keep bed low and locked with side rails adjusted as appropriate  - Keep care items and personal belongings within reach  - Initiate and maintain comfort rounds  - Make Fall Risk Sign visible to staff  - Apply yellow socks and bracelet for high fall risk patients  - Consider moving patient to room near nurses station  Outcome: Progressing  Goal: Maintain or return to baseline ADL function  Description: INTERVENTIONS:  -  Assess patient's ability to carry out ADLs; assess patient's baseline for ADL function and identify physical deficits which impact ability to perform ADLs (bathing, care of mouth/teeth, toileting, grooming, dressing, etc )  - Assess/evaluate cause of self-care deficits   - Assess range of motion  - Assess patient's mobility; develop plan if impaired  - Assess patient's need for assistive devices and provide as appropriate  - Encourage maximum independence but intervene and supervise when necessary  - Involve family in performance of ADLs  - Assess for home care needs following discharge   - Consider OT consult to assist with ADL evaluation and planning for discharge  - Provide patient education as appropriate  Outcome: Progressing  Goal: Maintains/Returns to pre admission functional level  Description: INTERVENTIONS:  - Perform BMAT or MOVE assessment daily    - Set and communicate daily mobility goal to care team and patient/family/caregiver  - Collaborate with rehabilitation services on mobility goals if consulted  - Perform Range of Motion 3 times a day    - Reposition patient every 3 hours  - Dangle patient 3 times a day  - Stand patient 3 times a day  - Ambulate patient 3 times a day  - Out of bed to chair 3 times a day   - Out of bed for meals 3 times a day  - Out of bed for toileting  - Record patient progress and toleration of activity level   Outcome: Progressing     Problem: DISCHARGE PLANNING  Goal: Discharge to home or other facility with appropriate resources  Description: INTERVENTIONS:  - Identify barriers to discharge w/patient and caregiver  - Arrange for needed discharge resources and transportation as appropriate  - Identify discharge learning needs (meds, wound care, etc )  - Arrange for interpretive services to assist at discharge as needed  - Refer to Case Management Department for coordinating discharge planning if the patient needs post-hospital services based on physician/advanced practitioner order or complex needs related to functional status, cognitive ability, or social support system  Outcome: Progressing     Problem: Knowledge Deficit  Goal: Patient/family/caregiver demonstrates understanding of disease process, treatment plan, medications, and discharge instructions  Description: Complete learning assessment and assess knowledge base    Interventions:  - Provide teaching at level of understanding  - Provide teaching via preferred learning methods  Outcome: Progressing

## 2023-05-17 NOTE — ASSESSMENT & PLAN NOTE
Maintained on lisinopril and hydrochlorothiazide    Blood pressure today as hydrochlorothiazide stopped we will switch him to outpatient Lasix 20 mg daily increase his lisinopril to 20 mg daily

## 2023-05-17 NOTE — ASSESSMENT & PLAN NOTE
· Patient presents with increased erythema, swelling and pain in the right lower extremity  · Saw his PCP 2 days prior to admission and was started on Keflex but continued to have fever and worsening redness and swelling despite being on antibiotics for 2 days  · Venous Doppler ultrasound negative for DVT  · Denies any trauma  · No prior history of MRSA  · Received ceftriaxone and vancomycin in the emergency room  · Continue high-dose Ancef yesterday raised to 2 g IV every 6 hours  Pain continues to improve its more soft calf  Continue with serial exams/neurovascular checks  Improved erythema and decreased swelling but still with significant induration  Continue with IV antibiotics and follow-up on blood cultures which were negative leukocytosis has resolved  He still has pain he does have significant swelling and induration in the calf performed CT of the lower extremity with a normal abscess just some edema    Reviewed infectious disease input and also orthopedics in terms of the Achilles tendon tendinitis/possible option for that he will follow outpatient with Ortho   will give him Lasix 20 mg IV x2 with benefit to be on Lasix 20 mg daily to keep his edema down instead of hydrochlorothiazide  · Swelling continues to improve as discussed with infectious disease today anticipate discharge on high-dose Keflex in 24 hours till 5/23

## 2023-05-18 ENCOUNTER — TRANSITIONAL CARE MANAGEMENT (OUTPATIENT)
Dept: FAMILY MEDICINE CLINIC | Facility: CLINIC | Age: 43
End: 2023-05-18

## 2023-05-18 VITALS
TEMPERATURE: 97.9 F | HEIGHT: 69 IN | RESPIRATION RATE: 16 BRPM | BODY MASS INDEX: 46.65 KG/M2 | HEART RATE: 76 BPM | DIASTOLIC BLOOD PRESSURE: 94 MMHG | WEIGHT: 315 LBS | OXYGEN SATURATION: 97 % | SYSTOLIC BLOOD PRESSURE: 151 MMHG

## 2023-05-18 LAB
ANION GAP SERPL CALCULATED.3IONS-SCNC: 7 MMOL/L (ref 4–13)
BACTERIA BLD CULT: NORMAL
BACTERIA BLD CULT: NORMAL
BUN SERPL-MCNC: 12 MG/DL (ref 5–25)
CALCIUM SERPL-MCNC: 9.2 MG/DL (ref 8.4–10.2)
CHLORIDE SERPL-SCNC: 101 MMOL/L (ref 96–108)
CO2 SERPL-SCNC: 31 MMOL/L (ref 21–32)
CREAT SERPL-MCNC: 0.82 MG/DL (ref 0.6–1.3)
GFR SERPL CREATININE-BSD FRML MDRD: 107 ML/MIN/1.73SQ M
GLUCOSE SERPL-MCNC: 93 MG/DL (ref 65–140)
POTASSIUM SERPL-SCNC: 3.9 MMOL/L (ref 3.5–5.3)
SODIUM SERPL-SCNC: 139 MMOL/L (ref 135–147)

## 2023-05-18 RX ORDER — CLOTRIMAZOLE 1 %
CREAM (GRAM) TOPICAL 2 TIMES DAILY
Qty: 30 G | Refills: 0 | Status: SHIPPED | OUTPATIENT
Start: 2023-05-18 | End: 2023-06-17

## 2023-05-18 RX ORDER — FUROSEMIDE 10 MG/ML
40 INJECTION INTRAMUSCULAR; INTRAVENOUS ONCE
Status: COMPLETED | OUTPATIENT
Start: 2023-05-18 | End: 2023-05-18

## 2023-05-18 RX ORDER — CEPHALEXIN 500 MG/1
1000 CAPSULE ORAL 4 TIMES DAILY
Qty: 40 CAPSULE | Refills: 0 | Status: SHIPPED | OUTPATIENT
Start: 2023-05-18 | End: 2023-05-23

## 2023-05-18 RX ORDER — FUROSEMIDE 20 MG/1
20 TABLET ORAL DAILY
Qty: 30 TABLET | Refills: 1 | Status: SHIPPED | OUTPATIENT
Start: 2023-05-19

## 2023-05-18 RX ORDER — LISINOPRIL 20 MG/1
20 TABLET ORAL DAILY
Qty: 30 TABLET | Refills: 0 | Status: SHIPPED | OUTPATIENT
Start: 2023-05-18

## 2023-05-18 RX ADMIN — CEFAZOLIN SODIUM 2000 MG: 2 SOLUTION INTRAVENOUS at 05:46

## 2023-05-18 RX ADMIN — ACETAMINOPHEN 975 MG: 325 TABLET ORAL at 05:46

## 2023-05-18 RX ADMIN — ENOXAPARIN SODIUM 60 MG: 60 INJECTION SUBCUTANEOUS at 00:31

## 2023-05-18 RX ADMIN — OXYCODONE HYDROCHLORIDE 5 MG: 5 TABLET ORAL at 00:31

## 2023-05-18 RX ADMIN — CEFAZOLIN SODIUM 2000 MG: 2 SOLUTION INTRAVENOUS at 00:31

## 2023-05-18 RX ADMIN — FUROSEMIDE 40 MG: 10 INJECTION, SOLUTION INTRAVENOUS at 09:09

## 2023-05-18 RX ADMIN — LISINOPRIL 20 MG: 20 TABLET ORAL at 09:09

## 2023-05-18 RX ADMIN — CLOTRIMAZOLE 1 APPLICATION.: 1 CREAM TOPICAL at 09:09

## 2023-05-18 NOTE — PLAN OF CARE
Problem: Potential for Falls  Goal: Patient will remain free of falls  Description: INTERVENTIONS:  - Educate patient/family on patient safety including physical limitations  - Instruct patient to call for assistance with activity   - Consult OT/PT to assist with strengthening/mobility   - Keep Call bell within reach  - Keep bed low and locked with side rails adjusted as appropriate  - Keep care items and personal belongings within reach  - Initiate and maintain comfort rounds  - Make Fall Risk Sign visible to staff  - Apply yellow socks and bracelet for high fall risk patients  - Consider moving patient to room near nurses station  Outcome: Progressing     Problem: PAIN - ADULT  Goal: Verbalizes/displays adequate comfort level or baseline comfort level  Description: Interventions:  - Encourage patient to monitor pain and request assistance  - Assess pain using appropriate pain scale0-10  - Administer analgesics based on type and severity of pain and evaluate response  - Implement non-pharmacological measures as appropriate and evaluate response  - Consider cultural and social influences on pain and pain management  - Notify physician/advanced practitioner if interventions unsuccessful or patient reports new pain  Outcome: Progressing     Problem: INFECTION - ADULT  Goal: Absence or prevention of progression during hospitalization  Description: INTERVENTIONS:  - Assess and monitor for signs and symptoms of infection  - Monitor lab/diagnostic results  - Monitor all insertion sites, i e  indwelling lines, tubes, and drains  - Monitor endotracheal if appropriate and nasal secretions for changes in amount and color  - [place] appropriate cooling/warming therapies per order  - Administer medications as ordered  - Instruct and encourage patient and family to use good hand hygiene technique  - Identify and instruct in appropriate isolation precautions for identified infection/condition  Outcome: Progressing  Goal: Absence of fever/infection during neutropenic period  Description: INTERVENTIONS:  - Monitor WBC    Outcome: Progressing     Problem: SAFETY ADULT  Goal: Patient will remain free of falls  Description: INTERVENTIONS:  - Educate patient/family on patient safety including physical limitations  - Instruct patient to call for assistance with activity   - Consult OT/PT to assist with strengthening/mobility   - Keep Call bell within reach  - Keep bed low and locked with side rails adjusted as appropriate  - Keep care items and personal belongings within reach  - Initiate and maintain comfort rounds  - Make Fall Risk Sign visible to staff  - Apply yellow socks and bracelet for high fall risk patients  - Consider moving patient to room near nurses station  Outcome: Progressing  Goal: Maintain or return to baseline ADL function  Description: INTERVENTIONS:  -  Assess patient's ability to carry out ADLs; assess patient's baseline for ADL function and identify physical deficits which impact ability to perform ADLs (bathing, care of mouth/teeth, toileting, grooming, dressing, etc )  - Assess/evaluate cause of self-care deficits   - Assess range of motion  - Assess patient's mobility; develop plan if impaired  - Assess patient's need for assistive devices and provide as appropriate  - Encourage maximum independence but intervene and supervise when necessary  - Involve family in performance of ADLs  - Assess for home care needs following discharge   - Consider OT consult to assist with ADL evaluation and planning for discharge  - Provide patient education as appropriate  Outcome: Progressing  Goal: Maintains/Returns to pre admission functional level  Description: INTERVENTIONS:  - Perform BMAT or MOVE assessment daily    - Set and communicate daily mobility goal to care team and patient/family/caregiver  - Collaborate with rehabilitation services on mobility goals if consulted  - Perform Range of Motion 3 times a day    - Reposition patient every 3 hours  - Dangle patient 3 times a day  - Stand patient 3 times a day  - Ambulate patient 3 times a day  - Out of bed to chair 3 times a day   - Out of bed for meals 3 times a day  - Out of bed for toileting  - Record patient progress and toleration of activity level   Outcome: Progressing     Problem: DISCHARGE PLANNING  Goal: Discharge to home or other facility with appropriate resources  Description: INTERVENTIONS:  - Identify barriers to discharge w/patient and caregiver  - Arrange for needed discharge resources and transportation as appropriate  - Identify discharge learning needs (meds, wound care, etc )  - Arrange for interpretive services to assist at discharge as needed  - Refer to Case Management Department for coordinating discharge planning if the patient needs post-hospital services based on physician/advanced practitioner order or complex needs related to functional status, cognitive ability, or social support system  Outcome: Progressing     Problem: Knowledge Deficit  Goal: Patient/family/caregiver demonstrates understanding of disease process, treatment plan, medications, and discharge instructions  Description: Complete learning assessment and assess knowledge base    Interventions:  - Provide teaching at level of understanding  - Provide teaching via preferred learning methods  Outcome: Progressing

## 2023-05-18 NOTE — NURSING NOTE
IV removed  AVS reviewed with patient  Antiembolism socks given to patient to take home  Work note given to patient  All questions answered

## 2023-05-18 NOTE — ASSESSMENT & PLAN NOTE
· Patient presents with increased erythema, swelling and pain in the right lower extremity  · Saw his PCP 2 days prior to admission and was started on Keflex but continued to have fever and worsening redness and swelling despite being on antibiotics for 2 days  · Venous Doppler ultrasound negative for DVT  · Denies any trauma  · No prior history of MRSA  · Received ceftriaxone and vancomycin in the emergency room  · Continue high-dose Ancef yesterday raised to 2 g IV every 6 hours  Pain continues to improve its more soft calf  Continue with serial exams/neurovascular checks  Improved erythema and decreased swelling but still with significant induration  Continue with IV antibiotics and follow-up on blood cultures which were negative leukocytosis has resolved  He still has pain he does have significant swelling and induration in the calf performed CT of the lower extremity with a normal abscess just some edema  Reviewed infectious disease input and also orthopedics in terms of the Achilles tendon tendinitis/possible option for that he will follow outpatient with Ortho  Leg edema improving the redness and the pain is improving  We will give another dose of Lasix 40 mg IV x1 on Lasix 20 mg daily to keep his edema down instead of hydrochlorothiazide  · DC on Keflex 1 g p o  4 times a day till May 23  Elevate the leg also discussed with him he may return to work next week after completing antibiotics on 524 and also to wear compression stockings

## 2023-05-19 ENCOUNTER — OFFICE VISIT (OUTPATIENT)
Dept: FAMILY MEDICINE CLINIC | Facility: CLINIC | Age: 43
End: 2023-05-19

## 2023-05-19 VITALS
TEMPERATURE: 97.1 F | HEART RATE: 82 BPM | BODY MASS INDEX: 46.65 KG/M2 | SYSTOLIC BLOOD PRESSURE: 158 MMHG | DIASTOLIC BLOOD PRESSURE: 92 MMHG | HEIGHT: 69 IN | OXYGEN SATURATION: 97 % | WEIGHT: 315 LBS

## 2023-05-19 DIAGNOSIS — R60.0 BILATERAL LEG EDEMA: ICD-10-CM

## 2023-05-19 DIAGNOSIS — L03.115 CELLULITIS OF RIGHT LOWER EXTREMITY: Primary | ICD-10-CM

## 2023-05-19 DIAGNOSIS — I10 ESSENTIAL HYPERTENSION: ICD-10-CM

## 2023-05-19 DIAGNOSIS — B35.3 TINEA PEDIS OF RIGHT FOOT: ICD-10-CM

## 2023-05-19 NOTE — PROGRESS NOTES
TCM Call     Date and time call was made  5/18/2023 11:20 AM    Hospital care reviewed  Records reviewed    Patient was hospitialized at  [facility]    Date of Admission  05/13/23    Date of discharge  05/18/23    Disposition  Home    Were the patients medications reviewed and updated  Yes      TCM Call     Should patient be enrolled in anticoag monitoring? No    Scheduled for follow up? Yes    Did you obtain your prescribed medications  Yes    Do you need help managing your prescriptions or medications  No    Is transportation to your appointment needed  No    I have advised the patient to call PCP with any new or worsening symptoms  Nomi Ocampo [address] or Significiant other    Support System  Spouse; Children; Family    The type of support provided  Emotional; Financial; Physical    Do you have social support  Yes, as much as I need    Are you recieving any outpatient services  No    Are you recieving home care services  No    Are you using any community resources  No    Interperter language line needed  No      Assessment/Plan:       1  Cellulitis of right lower extremity  Assessment & Plan:  Complete keflex      2  Tinea pedis of right foot  Comments:  use ointment    3  Essential hypertension  Assessment & Plan:  Continue lisinopril/lasix      4  Bilateral leg edema  Assessment & Plan:  Continue lasix          Subjective:      Patient ID: Dell Vazquez is a 39 y o  male  The patient was admitted with RLE cellulitis after failing outpatient oral keflex  He was given IV ancef  For edema he was switched from HCTZ to lasix  Bmp is ordered  For better BP control lisinopril was increased and he was given a cream for tinea pedis  He was evaluated by ID  No dvt or abscess        The following portions of the patient's history were reviewed and updated as appropriate: allergies, current medications, past family history, past "medical history, past social history, past surgical history, and problem list     Review of Systems   Respiratory: Negative  Cardiovascular: Negative  Objective:      /92 (BP Location: Right arm, Patient Position: Sitting, Cuff Size: Large)   Pulse 82   Temp (!) 97 1 °F (36 2 °C)   Ht 5' 9\" (1 753 m)   Wt (!) 161 kg (355 lb)   SpO2 97%   BMI 52 42 kg/m²          Physical Exam  Vitals and nursing note reviewed  Constitutional:       Appearance: Normal appearance  HENT:      Head: Normocephalic and atraumatic  Nose: Nose normal       Mouth/Throat:      Mouth: Mucous membranes are moist    Eyes:      Extraocular Movements: Extraocular movements intact  Pupils: Pupils are equal, round, and reactive to light  Cardiovascular:      Rate and Rhythm: Normal rate and regular rhythm  Pulses: Normal pulses  Pulmonary:      Effort: Pulmonary effort is normal       Breath sounds: Normal breath sounds  Abdominal:      General: Bowel sounds are normal       Palpations: Abdomen is soft  Musculoskeletal:      Cervical back: Normal range of motion  Skin:     General: Skin is warm and dry  Capillary Refill: Capillary refill takes less than 2 seconds  Neurological:      General: No focal deficit present  Mental Status: He is alert     Psychiatric:         Mood and Affect: Mood normal          "

## 2023-05-19 NOTE — UTILIZATION REVIEW
NOTIFICATION OF ADMISSION DISCHARGE   This is a Notification of Discharge from [facility]  Please be advised that this patient has been discharge from our facility  Below you will find the admission and discharge date and time including the patient’s disposition  UTILIZATION REVIEW CONTACT:  [address]  Utilization   Network Utilization Review Department  Phone: [phone] x carefully listen to the prompts  All voicemails are confidential   Email: [email or web address]     ADMISSION INFORMATION  PRESENTATION DATE: 5/13/2023 11:01 AM  OBERVATION ADMISSION DATE:   INPATIENT ADMISSION DATE: 5/13/23 11:59 AM   DISCHARGE DATE: 5/18/2023 12:00 PM   DISPOSITION:Home/Self Care    IMPORTANT INFORMATION:  Send all requests for admission clinical reviews, approved or denied determinations and any other requests to dedicated fax number below belonging to the campus where the patient is receiving treatment   List of dedicated fax numbers:  [address] DENIALS (Administrative/Medical Necessity) [phone]   [address] (Maternity/NICU/Pediatrics) [phone]   [facility] [phone]   [facility] 87 [phone]   [address] [phone]   220 [facility] [phone] 90 [facility] [phone]   [address] [place] [address] 119 [phone]   [facility]  [phone]   [address] [facility] [phone]   [address] [facility] [address] [facility] [phone]

## 2023-06-15 DIAGNOSIS — M79.604 RIGHT LEG PAIN: ICD-10-CM

## 2023-06-15 RX ORDER — OXYCODONE HYDROCHLORIDE 5 MG/1
5 TABLET ORAL EVERY 4 HOURS PRN
Qty: 30 TABLET | Refills: 0 | Status: SHIPPED | OUTPATIENT
Start: 2023-06-15

## 2023-06-19 ENCOUNTER — OFFICE VISIT (OUTPATIENT)
Dept: FAMILY MEDICINE CLINIC | Facility: CLINIC | Age: 43
End: 2023-06-19
Payer: COMMERCIAL

## 2023-06-19 VITALS
WEIGHT: 315 LBS | OXYGEN SATURATION: 99 % | HEIGHT: 69 IN | TEMPERATURE: 97.6 F | BODY MASS INDEX: 46.65 KG/M2 | HEART RATE: 83 BPM | SYSTOLIC BLOOD PRESSURE: 142 MMHG | DIASTOLIC BLOOD PRESSURE: 98 MMHG

## 2023-06-19 DIAGNOSIS — L03.115 CELLULITIS OF RIGHT LOWER EXTREMITY: Primary | ICD-10-CM

## 2023-06-19 DIAGNOSIS — I10 ESSENTIAL HYPERTENSION: ICD-10-CM

## 2023-06-19 DIAGNOSIS — I89.0 LYMPHEDEMA OF RIGHT LOWER EXTREMITY: ICD-10-CM

## 2023-06-19 DIAGNOSIS — R60.0 BILATERAL LEG EDEMA: ICD-10-CM

## 2023-06-19 DIAGNOSIS — F11.20 CONTINUOUS OPIOID DEPENDENCE (HCC): ICD-10-CM

## 2023-06-19 PROCEDURE — 99213 OFFICE O/P EST LOW 20 MIN: CPT | Performed by: NURSE PRACTITIONER

## 2023-06-19 RX ORDER — LISINOPRIL 20 MG/1
20 TABLET ORAL DAILY
Qty: 90 TABLET | Refills: 1 | Status: SHIPPED | OUTPATIENT
Start: 2023-06-19

## 2023-06-19 RX ORDER — CEPHALEXIN 500 MG/1
1000 CAPSULE ORAL EVERY 12 HOURS SCHEDULED
Qty: 56 CAPSULE | Refills: 0 | Status: SHIPPED | OUTPATIENT
Start: 2023-06-19 | End: 2023-07-03

## 2023-06-19 NOTE — PROGRESS NOTES
Name: Nadira Hilton      : 1980      MRN: [id number]  Encounter Provider: KELLI Patel  Encounter Date: 2023   Encounter department: [address] [place] [address] PRIMARY CARE    Assessment & Plan     1  Cellulitis of right lower extremity  -     cephalexin (KEFLEX) 500 mg capsule; Take 2 capsules (1,000 mg total) by mouth every 12 (twelve) hours for 14 days    2  Bilateral leg edema  -     cephalexin (KEFLEX) 500 mg capsule; Take 2 capsules (1,000 mg total) by mouth every 12 (twelve) hours for 14 days    3  Lymphedema of right lower extremity  -     Ambulatory Referral to Physical Therapy; Future  -     cephalexin (KEFLEX) 500 mg capsule; Take 2 capsules (1,000 mg total) by mouth every 12 (twelve) hours for 14 days    4  Continuous opioid dependence ([facility] [address] )    5  Essential hypertension  -     lisinopril (ZESTRIL) 20 mg tablet; Take 1 tablet (20 mg total) by mouth daily        BP borderline today but medication recently increased with last hospitalization  Reports fever this past weekend with increased pain to his right lower leg  Discussed ongoing swelling and how standing for long periods is not helpful to it  He would like to try light duty for a week at work  Will have him begin Keflex again as he is going on vacation  Referral placed to PT for lymphedema therapy evaluation  Subjective      Here today for a follow-up  Hx of right leg recurrent cellulitis  Right lower let with swelling - more than left  Has had vascular study and CT scan lower extremity which did not show the cause for the swelling  Recent CT scan right lower leg from the middle of May showing inflammation to his right achilles from possible tear - notes tendinosis  He reports that it has moderately healed and is only with intermittent pain  Reports that his right leg again began with redness and swelling and pain over this past weekend  Reports he had a fever this past weekend    Concern for his leg because "he is going on vacation next week  There is also concern as his is a  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  Review of Systems   Constitutional: Negative  Respiratory: Negative  Cardiovascular: Negative  Skin:        See HPI   Neurological: Negative  All other systems reviewed and are negative  Current Outpatient Medications on File Prior to Visit   Medication Sig   • furosemide (LASIX) 20 mg tablet Take 1 tablet (20 mg total) by mouth daily Do not start before May 19, 2023  • oxyCODONE (Roxicodone) 5 immediate release tablet Take 1 tablet (5 mg total) by mouth every 4 (four) hours as needed for moderate pain Max Daily Amount: 30 mg   • [DISCONTINUED] lisinopril (ZESTRIL) 20 mg tablet Take 1 tablet (20 mg total) by mouth daily   • clotrimazole (LOTRIMIN) 1 % cream Apply topically 2 (two) times a day       Objective     /98 (BP Location: Left arm, Patient Position: Sitting, Cuff Size: Standard)   Pulse 83   Temp 97 6 °F (36 4 °C)   Ht 5' 9\" (1 753 m)   Wt (!) 165 kg (363 lb 12 8 oz)   SpO2 99%   BMI 53 72 kg/m²     Physical Exam  Skin:         Neurological:      Mental Status: He is alert and oriented to person, place, and time         KELLI Hernandez  "

## 2023-06-19 NOTE — LETTER
June 19, 2023     Patient: Rakesh Parada  YOB: 1980  Date of Visit: 6/19/2023      To Whom it May Concern:    Rakesh Parada is under my professional care  Maureen Martinez was seen in my office on 6/19/2023  Please allow Maureen Martinez to participate in light duty with his employer due to physical limitations with ambulation at this time  If you have any questions or concerns, please don't hesitate to call           Sincerely,          Hector Denson

## 2023-07-11 ENCOUNTER — EVALUATION (OUTPATIENT)
Dept: PHYSICAL THERAPY | Facility: CLINIC | Age: 43
End: 2023-07-11
Payer: COMMERCIAL

## 2023-07-11 VITALS — OXYGEN SATURATION: 98 % | DIASTOLIC BLOOD PRESSURE: 90 MMHG | HEART RATE: 72 BPM | SYSTOLIC BLOOD PRESSURE: 150 MMHG

## 2023-07-11 DIAGNOSIS — M79.604 RIGHT LEG PAIN: ICD-10-CM

## 2023-07-11 DIAGNOSIS — I89.0 LYMPHEDEMA OF RIGHT LOWER EXTREMITY: ICD-10-CM

## 2023-07-11 PROCEDURE — 97140 MANUAL THERAPY 1/> REGIONS: CPT | Performed by: PHYSICAL THERAPIST

## 2023-07-11 PROCEDURE — 97530 THERAPEUTIC ACTIVITIES: CPT | Performed by: PHYSICAL THERAPIST

## 2023-07-11 PROCEDURE — 97162 PT EVAL MOD COMPLEX 30 MIN: CPT | Performed by: PHYSICAL THERAPIST

## 2023-07-11 RX ORDER — OXYCODONE HYDROCHLORIDE 5 MG/1
5 TABLET ORAL EVERY 4 HOURS PRN
Qty: 30 TABLET | Refills: 0 | Status: SHIPPED | OUTPATIENT
Start: 2023-07-11

## 2023-07-11 NOTE — PROGRESS NOTES
PT Evaluation     Today's date: 2023  Patient name: Venkat Ca  : 1980  MRN: [id number]  Referring provider: Selvin Borrero, *  Dx:   Encounter Diagnosis     ICD-10-CM    1. Lymphedema of right lower extremity  I89.0 Ambulatory Referral to Physical Therapy                 Lymphedema Physical Assessment    SUBJECTIVE EVALUATION:  History of present illness:    RLE cellulitis with recent hospitalization -. Pt notes since tx and use of Furesomide, swelling has reduced significantly. Pt notes this is the 2nd hospitalization since onset 2019. He notes 2 other prior cellulitis infections RLE since 2019 successfully treated with oral antibiotics. Patient notes he is currently working as  on light duty as per his PCP. Patient notes swelling in RLE is not a problem between episodes of cellulitis. He denies swelling in LEs between episodes of cellulitis. Pain (0-10):  0/10 RLE, abdomen   Location:    OBJECTIVE:    Pulse: (0 no, 1+ diminished, 2+ mildly diminished, 3+ normal, 4 bounding)     Dorsal Pedal: 2+  Posterior Tibial:    Capillary Refill (=< 3 seconds is normal):  2-3 seconds    Palpation: mild fullness R calf    Pitting:none    Stemmer's Sign:  Negative     Gait assessment: I amb with heel toe gait    Transfer status: I sit to stand, supine to sit    Ability to don/doff clothing/garments: Independent    LE ROM:   Knee flexion limitation: WNLs  Ankle DF/PF limitation: df = 2 deg, pf WNLs no pain reported        Strength:  UE:  LE:  Strength grossly through out R hip, knee, ankle toes 4+/5 with no pain sx.   R ankle pf 4/5 no pain    Self management:  Home compression pump: no  Compression stockings: yes, did not bring today, has not worn- states was provided in hospital  Lymphedema exercise: notes some elevation and some ankle pumps, not regularly    Skin Assessment:  Skin mobility: good  Fibrosis (0 normal - 4 >severe): 0  Erythema scale (1 very faint, 2 faint, 3 bright, 4 very bright): no erythema  But mild duskiness circumferentially at level of   Hemosiderin staining present: no  Blister present:  none   Location:   Color:   Size:  Wound present: none   Location:   Color:   Size:    Visualization:moderate amounts of  dry skin, scaly, decreased hair on B LEs distal to knees, notes fungal on heels  With cracking and dry skin,  Pt reports he is  tx currently with lotrimin 2 x per day which is helping. Bony prominences: malleoli B, MTP  Tendon pathways: no  B feet  Joint creases: yes    Lymphedema classification (0 latent, 1 reverse, 2 non-rev, 3 elephantiasis): 0-1    PMHx:  Cellulitis: HX of  4 episodes of cellulitis since 2019,  2 hospitalization, 2 tx with oral antibiotic, non hospitalizations   Most recent: 5/2023  Cardiac: HTN controlled with meds, recent change noted while hospitalized he reports   Renal: denies  Hepatic: denies  Cancer: denies   Type:   Treatment:  Surgical: denies    LOWER EXTREMITY LEFT CALCULATIONS    Flowsheet Row Most Recent Value   Volume LE (mL) 6971   Difference from last visit (mL)  -6971   Difference from first visit (mL)  -6971      LOWER EXTREMITY RIGHT CALCULATIONS    Flowsheet Row Most Recent Value   Volume LE (mL) 7756   Difference from last visit (mL)  -7756   Difference from first visit (mL)  -7756      LOWER EXTREMITY CHANGE OF AFFECTED LIMB CALCULATIONS    Flowsheet Row Most Recent Value   Current change in volume of affected limb (mL)  0   Current change in volume of affected limb (%)  11   Change in volume of affected limb since eval (mL)  0   Change in volume of affected limb since eval (%)  0              ASSESSMENT:  Assessment details: 38 yo male pt presents w/ R LE edema w/ negative  stemmer's, no  pitting, and no skin fibrosis. He does present with significant dry scaly skin R > L LE and report of fungus on B heels tx with Lotrimin. Pt notes significant improvement in swelling RLE in the past 2 weeks.  He notes he does not have difficulty with swelling between episodes of cellulitis. Swelling  involvement of R  lower leg, R>L girth measurements are noted which fits visual inspection of R>L LE size. PMHx significant for cellulitis X 4 R LE which could have been an indicator and potential compromise of LE lymphatics. Pt does have compression stocking but has not used. PT reviewed with patient lymphedema education oflife long managements and prevention of cellulitis,  skin care including: keeping extremity clean and dry, applying moisturizer daily, special attention to nail care, wearing sunscreen avoiding nicks and skin irritation from gardening, yard work, wearing clothing to  Prevent and avoidance of activity that  may cause skin injury. Also reviewed for patient to avoid excessive constriction such as tight clothing and jewelry, extreme temperature changes, and the importance of compliance for bandaging and garments once correct fit established. Pt is to bring compression stocking next visit. Patient also advised to contact physician if experiencing any constitutional symptoms, swelling, redness, pain, rash, itching, or increased skin temperature. Advise initiating full CDT protocol w/ MLD and compression bandaging followed by fitment of compression stockings once limb size is reduced. Goals:  :ST. Reduce B LE edema >1 cm girth measurement within 2 wks  2. Pt to obtain bandaging supplies within 1 wk  3. Increase R ankle df to 10 deg  LT. Reduce B LE girth measurements > 2 cm within 5 wks  2. Pt I in self MLD and transition to phase 2 CDT protocol within 5 wks  3.  Pt fitted for proper compression garments B LE within 5 wks    FOTO score: 56    PLAN:  Patient would benefit from: skilled physical therapy  Planned therapy interventions: manual therapy, massage, compression, home exercise program and patient education  Frequency:  1-2 x per week  Duration in visits: -  Duration in weeks: 6  Plan of Care beginning date: 7/11/2023  Plan of Care expiration date: 8/22/23  Treatment plan discussed with: patient    Precautions: HTN     Daily Treatment Diary:      Initial Evaluation Date: 07/11/23  Compliance 7/11                     Visit Number 1                    Re-Jannette OVALLE                 [facility]   Foto Captured Y                           7/11                     Manual                      BLE circumferential measures 10,m                                                                 Ther-Ex                                                                                                                                                                                                                                                  Neuro Re-Ed                                                                                                Ther-Act              Pt education  In skin care, hydration, LY managements and prevention, nail care, tx plan                                                 Modalities

## 2023-07-18 ENCOUNTER — APPOINTMENT (OUTPATIENT)
Dept: PHYSICAL THERAPY | Facility: CLINIC | Age: 43
End: 2023-07-18
Payer: COMMERCIAL

## 2023-07-21 ENCOUNTER — TELEPHONE (OUTPATIENT)
Dept: FAMILY MEDICINE CLINIC | Facility: CLINIC | Age: 43
End: 2023-07-21

## 2023-07-21 NOTE — TELEPHONE ENCOUNTER
Patient is requesting an updated note to employer stating he may return to work full duty with no restrictions. States the letter that was given isn't sufficient. Patient will  when it is ready. Call patient at [phone] when ready for .

## 2023-11-29 ENCOUNTER — TELEPHONE (OUTPATIENT)
Dept: FAMILY MEDICINE CLINIC | Facility: CLINIC | Age: 43
End: 2023-11-29

## 2023-12-07 ENCOUNTER — OFFICE VISIT (OUTPATIENT)
Dept: FAMILY MEDICINE CLINIC | Facility: CLINIC | Age: 43
End: 2023-12-07
Payer: COMMERCIAL

## 2023-12-07 VITALS
DIASTOLIC BLOOD PRESSURE: 62 MMHG | HEART RATE: 89 BPM | SYSTOLIC BLOOD PRESSURE: 112 MMHG | OXYGEN SATURATION: 98 % | HEIGHT: 69 IN | WEIGHT: 315 LBS | BODY MASS INDEX: 46.65 KG/M2

## 2023-12-07 DIAGNOSIS — L03.115 CELLULITIS OF LEG, RIGHT: Primary | ICD-10-CM

## 2023-12-07 DIAGNOSIS — R60.0 LEG EDEMA: ICD-10-CM

## 2023-12-07 DIAGNOSIS — M79.604 RIGHT LEG PAIN: ICD-10-CM

## 2023-12-07 DIAGNOSIS — R60.0 BILATERAL LEG EDEMA: ICD-10-CM

## 2023-12-07 DIAGNOSIS — I10 ESSENTIAL HYPERTENSION: ICD-10-CM

## 2023-12-07 PROCEDURE — 99214 OFFICE O/P EST MOD 30 MIN: CPT | Performed by: FAMILY MEDICINE

## 2023-12-07 RX ORDER — LISINOPRIL 20 MG/1
20 TABLET ORAL DAILY
Qty: 90 TABLET | Refills: 1 | Status: SHIPPED | OUTPATIENT
Start: 2023-12-07

## 2023-12-07 RX ORDER — FUROSEMIDE 20 MG/1
20 TABLET ORAL DAILY
Qty: 30 TABLET | Refills: 1 | Status: SHIPPED | OUTPATIENT
Start: 2023-12-07

## 2023-12-07 RX ORDER — OXYCODONE HYDROCHLORIDE 5 MG/1
5 TABLET ORAL EVERY 4 HOURS PRN
Qty: 30 TABLET | Refills: 0 | Status: SHIPPED | OUTPATIENT
Start: 2023-12-07

## 2023-12-07 RX ORDER — CEPHALEXIN 500 MG/1
CAPSULE ORAL
Qty: 20 CAPSULE | Refills: 0 | Status: SHIPPED | OUTPATIENT
Start: 2023-12-07 | End: 2023-12-19

## 2023-12-07 NOTE — TELEPHONE ENCOUNTER
Requested medication(s) are due for refill today: Yes  Patient has already received a courtesy refill: No  Other reason request has been forwarded to provider: Female

## 2023-12-07 NOTE — LETTER
RE: Sidney Paiz  : 1980    To Whom It May Concern:         Arnie Whitaker was seen and evaluated in my office today 2023. He is being treated for an acute medical problem that would be worsened by activity. Please allow him to have light duty and a sedentary job for the next 2 weeks. If you have any questions or concerns please contact me at the above number.     Sincerely,    Joaquin Brown MD

## 2023-12-07 NOTE — PROGRESS NOTES
Assessment/Plan:       1. Cellulitis of leg, right  Comments:  start keflex  Orders:  -     cephalexin (Keflex) 500 mg capsule; Take 2 pills (1000mg) bid for 10 days    2. Bilateral leg edema  Assessment & Plan:  Ref to PT    Orders:  -     Ambulatory Referral to PT/OT Lymphedema Therapy; Future          Subjective:      Patient ID: Lindsey Will is a 39 y.o. male. Patient has a history of bilateral lymphedema in the lower extremities and recurrent cellulitis. He was admitted and treated with high-dose oral Keflex back in May he was better and now the symptoms have returned his right leg is red warm swollen. No fever or systemic symptoms. His left leg is also edematous chronically but has no signs of infection currently. Leg Pain         The following portions of the patient's history were reviewed and updated as appropriate: allergies, current medications, past family history, past medical history, past social history, past surgical history, and problem list.    Review of Systems   Respiratory: Negative. Cardiovascular: Negative. Gastrointestinal: Negative. Objective:      /62 (BP Location: Left arm, Patient Position: Sitting, Cuff Size: Large)   Pulse 89   Ht 5' 9" (1.753 m)   Wt (!) 169 kg (371 lb 9.6 oz)   SpO2 98%   BMI 54.88 kg/m²          Physical Exam  Vitals and nursing note reviewed. Constitutional:       Appearance: Normal appearance. HENT:      Head: Normocephalic and atraumatic. Nose: Nose normal.      Mouth/Throat:      Mouth: Mucous membranes are moist.   Eyes:      Extraocular Movements: Extraocular movements intact. Pupils: Pupils are equal, round, and reactive to light. Cardiovascular:      Rate and Rhythm: Normal rate and regular rhythm. Pulses: Normal pulses. Pulmonary:      Effort: Pulmonary effort is normal.      Breath sounds: Normal breath sounds. Abdominal:      General: Bowel sounds are normal.      Palpations: Abdomen is soft. Musculoskeletal:      Cervical back: Normal range of motion. Comments: Right leg red and warm and edematous   Skin:     General: Skin is warm and dry. Capillary Refill: Capillary refill takes less than 2 seconds. Neurological:      General: No focal deficit present. Mental Status: He is alert.    Psychiatric:         Mood and Affect: Mood normal.

## 2023-12-07 NOTE — LETTER
December 7, 2023     Patient: Bart Trivedi  YOB: 1980  Date of Visit: 12/7/2023      To Whom it May Concern:    Bart Trivedi is under my professional care. Delta Holm was seen in my office on 12/7/2023. Delta Holm may return to work on 12/7/23 . Please excuse 12/6/23. If you have any questions or concerns, please don't hesitate to call.          Sincerely,          Audi Felton MD        CC: No Recipients

## 2023-12-28 DIAGNOSIS — M79.604 RIGHT LEG PAIN: ICD-10-CM

## 2023-12-28 RX ORDER — OXYCODONE HYDROCHLORIDE 5 MG/1
5 TABLET ORAL EVERY 4 HOURS PRN
Qty: 30 TABLET | Refills: 0 | Status: SHIPPED | OUTPATIENT
Start: 2023-12-28

## 2024-01-03 ENCOUNTER — TELEPHONE (OUTPATIENT)
Dept: FAMILY MEDICINE CLINIC | Facility: CLINIC | Age: 44
End: 2024-01-03

## 2024-01-03 NOTE — LETTER
January 3, 2024     Patient: Nicolás Balderrama  YOB: 1980  Date of Visit: 1/3/2024      To Whom it May Concern:    Nicolás Balderrama is under my professional care. Nicolás was seen in my office on 1/3/2024. Nicolás may return to work on 1/3/24 .please excuse 12/29 and 12/30    If you have any questions or concerns, please don't hesitate to call.         Sincerely,          Brielle Reading        CC: No Recipients

## 2024-01-03 NOTE — TELEPHONE ENCOUNTER
Patient has re-occurring cellulitis and had to call off work on 12/29 and 12/30. He dose have an FMLA for his work for this, but he needs a work note for these days and it also needs to state that his current FMLA is extended to May 11, 2024    Fax to [phone] lizbeth Avery

## 2024-01-03 NOTE — LETTER
RE:  Nicolás Balderrama  : [birth year]      To Whom It May Concern:         Nicolás needs his FMLA extended through 24.  His chronic medical condition is not well controlled currently.  If you have any questions please contact me.      Sincerely,    Robert Budinetz MD

## 2024-02-26 ENCOUNTER — TELEPHONE (OUTPATIENT)
Dept: FAMILY MEDICINE CLINIC | Facility: CLINIC | Age: 44
End: 2024-02-26

## 2024-02-26 ENCOUNTER — OFFICE VISIT (OUTPATIENT)
Dept: FAMILY MEDICINE CLINIC | Facility: CLINIC | Age: 44
End: 2024-02-26
Payer: COMMERCIAL

## 2024-02-26 VITALS
HEIGHT: 69 IN | SYSTOLIC BLOOD PRESSURE: 142 MMHG | OXYGEN SATURATION: 97 % | WEIGHT: 315 LBS | BODY MASS INDEX: 46.65 KG/M2 | HEART RATE: 93 BPM | DIASTOLIC BLOOD PRESSURE: 90 MMHG

## 2024-02-26 DIAGNOSIS — M79.604 RIGHT LEG PAIN: ICD-10-CM

## 2024-02-26 DIAGNOSIS — B35.3 TINEA PEDIS: ICD-10-CM

## 2024-02-26 DIAGNOSIS — L03.115 CELLULITIS OF RIGHT LOWER EXTREMITY: Primary | ICD-10-CM

## 2024-02-26 PROBLEM — F11.20 CONTINUOUS OPIOID DEPENDENCE (HCC): Status: RESOLVED | Noted: 2023-06-19 | Resolved: 2024-02-26

## 2024-02-26 PROCEDURE — 99213 OFFICE O/P EST LOW 20 MIN: CPT | Performed by: NURSE PRACTITIONER

## 2024-02-26 RX ORDER — CEPHALEXIN 500 MG/1
500 CAPSULE ORAL EVERY 12 HOURS SCHEDULED
Qty: 20 CAPSULE | Refills: 0 | Status: SHIPPED | OUTPATIENT
Start: 2024-02-26 | End: 2024-03-07

## 2024-02-26 RX ORDER — SULFAMETHOXAZOLE AND TRIMETHOPRIM 800; 160 MG/1; MG/1
1 TABLET ORAL 2 TIMES DAILY
Qty: 20 TABLET | Refills: 0 | Status: SHIPPED | OUTPATIENT
Start: 2024-02-26 | End: 2024-03-07

## 2024-02-26 RX ORDER — CLOTRIMAZOLE 1 %
CREAM (GRAM) TOPICAL 2 TIMES DAILY
Qty: 30 G | Refills: 0 | Status: SHIPPED | OUTPATIENT
Start: 2024-02-26 | End: 2024-03-27

## 2024-02-26 RX ORDER — OXYCODONE HYDROCHLORIDE 5 MG/1
5 TABLET ORAL EVERY 4 HOURS PRN
Qty: 30 TABLET | Refills: 0 | Status: SHIPPED | OUTPATIENT
Start: 2024-02-26

## 2024-02-26 NOTE — LETTER
February 26, 2024     Patient: Nicolás Balderrama  YOB: 1980  Date of Visit: 2/26/2024      To Whom it May Concern:    Nicolás Balderrama is under my professional care. Nicolás was seen in my office on 2/26/2024. Please allow Nicolás to participate in light duty with his employer due to physical limitations with ambulation at this time for 2 weeks.  He may return to full duty on 03/13/2024.     If you have any questions or concerns, please don't hesitate to call.         Sincerely,          KELLI Palacios

## 2024-02-26 NOTE — PROGRESS NOTES
"Name: Nicolás Balderrama      : 1980      MRN: [id number]  Encounter Provider: KELLI Palacios  Encounter Date: 2024   Encounter department: [facility]    Assessment & Plan     1. Cellulitis of right lower extremity  -     cephalexin (KEFLEX) 500 mg capsule; Take 1 capsule (500 mg total) by mouth every 12 (twelve) hours for 10 days  -     sulfamethoxazole-trimethoprim (BACTRIM DS) 800-160 mg per tablet; Take 1 tablet by mouth 2 (two) times a day for 10 days    Abx prescribed.  Pain medication ordered earlier today.  Note for work provided to him for light duty.         Subjective      Here today for an acute visit.  Reports hx of left leg cellulitis.  Notes he felt pain in his left leg this past Wednesday.  Followed by the onset of feeling ill this past weekend.  He developed redness and swelling of his left lower leg yesterday.      Medication Refill      Review of Systems   Skin:  Positive for wound.   All other systems reviewed and are negative.      Current Outpatient Medications on File Prior to Visit   Medication Sig    clotrimazole (LOTRIMIN) 1 % cream Apply topically 2 (two) times a day    furosemide (LASIX) 20 mg tablet Take 1 tablet (20 mg total) by mouth daily    lisinopril (ZESTRIL) 20 mg tablet Take 1 tablet (20 mg total) by mouth daily    oxyCODONE (Roxicodone) 5 immediate release tablet Take 1 tablet (5 mg total) by mouth every 4 (four) hours as needed for moderate pain Max Daily Amount: 30 mg    [DISCONTINUED] oxyCODONE (Roxicodone) 5 immediate release tablet Take 1 tablet (5 mg total) by mouth every 4 (four) hours as needed for moderate pain Max Daily Amount: 30 mg       Objective     /90 (BP Location: Left arm, Patient Position: Sitting, Cuff Size: Large)   Pulse 93   Ht 5' 9\" (1.753 m)   Wt (!) 171 kg (376 lb 3.2 oz)   SpO2 97%   BMI 55.56 kg/m²     Physical Exam  Constitutional:       Appearance: Normal appearance.   Pulmonary:      Effort: " Pulmonary effort is normal.   Skin:         Neurological:      General: No focal deficit present.      Mental Status: He is alert and oriented to person, place, and time.       KELLI Palacios

## 2024-03-18 DIAGNOSIS — R60.0 LEG EDEMA: ICD-10-CM

## 2024-03-18 DIAGNOSIS — I10 ESSENTIAL HYPERTENSION: ICD-10-CM

## 2024-03-18 DIAGNOSIS — M79.604 RIGHT LEG PAIN: ICD-10-CM

## 2024-03-18 RX ORDER — FUROSEMIDE 20 MG/1
20 TABLET ORAL DAILY
Qty: 90 TABLET | Refills: 1 | Status: SHIPPED | OUTPATIENT
Start: 2024-03-18

## 2024-03-18 RX ORDER — OXYCODONE HYDROCHLORIDE 5 MG/1
5 TABLET ORAL EVERY 4 HOURS PRN
Qty: 30 TABLET | Refills: 0 | Status: SHIPPED | OUTPATIENT
Start: 2024-03-18

## 2024-03-18 RX ORDER — LISINOPRIL 20 MG/1
20 TABLET ORAL DAILY
Qty: 90 TABLET | Refills: 1 | Status: SHIPPED | OUTPATIENT
Start: 2024-03-18

## 2024-04-21 ENCOUNTER — APPOINTMENT (EMERGENCY)
Dept: NON INVASIVE DIAGNOSTICS | Facility: HOSPITAL | Age: 44
DRG: 872 | End: 2024-04-21
Payer: COMMERCIAL

## 2024-04-21 ENCOUNTER — HOSPITAL ENCOUNTER (INPATIENT)
Facility: HOSPITAL | Age: 44
LOS: 5 days | Discharge: HOME | DRG: 872 | End: 2024-04-26
Attending: EMERGENCY MEDICINE | Admitting: FAMILY MEDICINE
Payer: COMMERCIAL

## 2024-04-21 DIAGNOSIS — L03.115 CELLULITIS OF RIGHT LOWER LEG: Primary | ICD-10-CM

## 2024-04-21 DIAGNOSIS — L03.115 CELLULITIS OF RIGHT LOWER EXTREMITY: ICD-10-CM

## 2024-04-21 LAB
ALBUMIN SERPL BCP-MCNC: 3.9 G/DL (ref 3.5–5)
ALP SERPL-CCNC: 102 U/L (ref 34–104)
ALT SERPL W P-5'-P-CCNC: 39 U/L (ref 7–52)
ANION GAP SERPL CALCULATED.3IONS-SCNC: 6 MMOL/L (ref 4–13)
ANISOCYTOSIS BLD QL SMEAR: PRESENT
APTT PPP: 41 SECONDS (ref 23–37)
AST SERPL W P-5'-P-CCNC: 38 U/L (ref 13–39)
BASOPHILS # BLD MANUAL: 0 THOUSAND/UL (ref 0–0.1)
BASOPHILS NFR MAR MANUAL: 0 % (ref 0–1)
BILIRUB SERPL-MCNC: 2.34 MG/DL (ref 0.2–1)
BUN SERPL-MCNC: 17 MG/DL (ref 5–25)
CALCIUM SERPL-MCNC: 9.4 MG/DL (ref 8.4–10.2)
CHLORIDE SERPL-SCNC: 102 MMOL/L (ref 96–108)
CO2 SERPL-SCNC: 27 MMOL/L (ref 21–32)
CREAT SERPL-MCNC: 1.2 MG/DL (ref 0.6–1.3)
EOSINOPHIL # BLD MANUAL: 0 THOUSAND/UL (ref 0–0.4)
EOSINOPHIL NFR BLD MANUAL: 0 % (ref 0–6)
ERYTHROCYTE [DISTWIDTH] IN BLOOD BY AUTOMATED COUNT: 13.1 % (ref 11.6–15.1)
GFR SERPL CREATININE-BSD FRML MDRD: 72 ML/MIN/1.73SQ M
GIANT PLATELETS BLD QL SMEAR: PRESENT
GLUCOSE SERPL-MCNC: 93 MG/DL (ref 65–140)
HCT VFR BLD AUTO: 43.7 % (ref 36.5–49.3)
HGB BLD-MCNC: 14.6 G/DL (ref 12–17)
INR PPP: 1.2 (ref 0.84–1.19)
LACTATE SERPL-SCNC: 0.9 MMOL/L (ref 0.5–2)
LYMPHOCYTES # BLD AUTO: 0.5 THOUSAND/UL (ref 0.6–4.47)
LYMPHOCYTES # BLD AUTO: 3 % (ref 14–44)
MCH RBC QN AUTO: 28.7 PG (ref 26.8–34.3)
MCHC RBC AUTO-ENTMCNC: 33.4 G/DL (ref 31.4–37.4)
MCV RBC AUTO: 86 FL (ref 82–98)
METAMYELOCYTE ABSOLUTE CT: 0.17 THOUSAND/UL (ref 0–0.1)
METAMYELOCYTES NFR BLD MANUAL: 1 % (ref 0–1)
MICROCYTES BLD QL AUTO: PRESENT
MONOCYTES # BLD AUTO: 0.17 THOUSAND/UL (ref 0–1.22)
MONOCYTES NFR BLD: 1 % (ref 4–12)
NEUTROPHILS # BLD MANUAL: 15.89 THOUSAND/UL (ref 1.85–7.62)
NEUTS BAND NFR BLD MANUAL: 13 % (ref 0–8)
NEUTS SEG NFR BLD AUTO: 82 % (ref 43–75)
PLATELET # BLD AUTO: 206 THOUSANDS/UL (ref 149–390)
PLATELET BLD QL SMEAR: ADEQUATE
PMV BLD AUTO: 11.5 FL (ref 8.9–12.7)
POTASSIUM SERPL-SCNC: 4.1 MMOL/L (ref 3.5–5.3)
PROCALCITONIN SERPL-MCNC: 12.44 NG/ML
PROT SERPL-MCNC: 7.9 G/DL (ref 6.4–8.4)
PROTHROMBIN TIME: 15.5 SECONDS (ref 11.6–14.5)
RBC # BLD AUTO: 5.09 MILLION/UL (ref 3.88–5.62)
RBC MORPH BLD: PRESENT
SODIUM SERPL-SCNC: 135 MMOL/L (ref 135–147)
WBC # BLD AUTO: 16.73 THOUSAND/UL (ref 4.31–10.16)
WBC TOXIC VACUOLES BLD QL SMEAR: PRESENT

## 2024-04-21 PROCEDURE — 87040 BLOOD CULTURE FOR BACTERIA: CPT | Performed by: PHYSICIAN ASSISTANT

## 2024-04-21 PROCEDURE — 99223 1ST HOSP IP/OBS HIGH 75: CPT | Performed by: FAMILY MEDICINE

## 2024-04-21 PROCEDURE — 99284 EMERGENCY DEPT VISIT MOD MDM: CPT

## 2024-04-21 PROCEDURE — 85610 PROTHROMBIN TIME: CPT | Performed by: PHYSICIAN ASSISTANT

## 2024-04-21 PROCEDURE — 85730 THROMBOPLASTIN TIME PARTIAL: CPT | Performed by: PHYSICIAN ASSISTANT

## 2024-04-21 PROCEDURE — 93971 EXTREMITY STUDY: CPT | Performed by: SURGERY

## 2024-04-21 PROCEDURE — 84145 PROCALCITONIN (PCT): CPT | Performed by: PHYSICIAN ASSISTANT

## 2024-04-21 PROCEDURE — 85007 BL SMEAR W/DIFF WBC COUNT: CPT | Performed by: PHYSICIAN ASSISTANT

## 2024-04-21 PROCEDURE — 93971 EXTREMITY STUDY: CPT

## 2024-04-21 PROCEDURE — 85027 COMPLETE CBC AUTOMATED: CPT | Performed by: PHYSICIAN ASSISTANT

## 2024-04-21 PROCEDURE — 96365 THER/PROPH/DIAG IV INF INIT: CPT

## 2024-04-21 PROCEDURE — 83605 ASSAY OF LACTIC ACID: CPT | Performed by: PHYSICIAN ASSISTANT

## 2024-04-21 PROCEDURE — 36415 COLL VENOUS BLD VENIPUNCTURE: CPT | Performed by: PHYSICIAN ASSISTANT

## 2024-04-21 PROCEDURE — 96375 TX/PRO/DX INJ NEW DRUG ADDON: CPT

## 2024-04-21 PROCEDURE — 99285 EMERGENCY DEPT VISIT HI MDM: CPT | Performed by: PHYSICIAN ASSISTANT

## 2024-04-21 PROCEDURE — 80053 COMPREHEN METABOLIC PANEL: CPT | Performed by: PHYSICIAN ASSISTANT

## 2024-04-21 RX ORDER — ENOXAPARIN SODIUM 100 MG/ML
60 INJECTION SUBCUTANEOUS EVERY 12 HOURS SCHEDULED
Status: DISCONTINUED | OUTPATIENT
Start: 2024-04-21 | End: 2024-04-26 | Stop reason: HOSPADM

## 2024-04-21 RX ORDER — LISINOPRIL 20 MG/1
20 TABLET ORAL DAILY
Status: DISCONTINUED | OUTPATIENT
Start: 2024-04-22 | End: 2024-04-26 | Stop reason: HOSPADM

## 2024-04-21 RX ORDER — OXYCODONE HYDROCHLORIDE 5 MG/1
5 TABLET ORAL EVERY 4 HOURS PRN
Status: DISCONTINUED | OUTPATIENT
Start: 2024-04-21 | End: 2024-04-21

## 2024-04-21 RX ORDER — MORPHINE SULFATE 4 MG/ML
4 INJECTION, SOLUTION INTRAMUSCULAR; INTRAVENOUS EVERY 2 HOUR PRN
Status: DISCONTINUED | OUTPATIENT
Start: 2024-04-21 | End: 2024-04-26 | Stop reason: HOSPADM

## 2024-04-21 RX ORDER — CEFTRIAXONE 2 G/50ML
2000 INJECTION, SOLUTION INTRAVENOUS ONCE
Status: COMPLETED | OUTPATIENT
Start: 2024-04-21 | End: 2024-04-21

## 2024-04-21 RX ORDER — FUROSEMIDE 20 MG/1
20 TABLET ORAL DAILY
Status: DISCONTINUED | OUTPATIENT
Start: 2024-04-21 | End: 2024-04-23

## 2024-04-21 RX ORDER — MORPHINE SULFATE 4 MG/ML
4 INJECTION, SOLUTION INTRAMUSCULAR; INTRAVENOUS ONCE
Status: COMPLETED | OUTPATIENT
Start: 2024-04-21 | End: 2024-04-21

## 2024-04-21 RX ORDER — ACETAMINOPHEN 325 MG/1
650 TABLET ORAL EVERY 6 HOURS PRN
Status: DISCONTINUED | OUTPATIENT
Start: 2024-04-21 | End: 2024-04-26 | Stop reason: HOSPADM

## 2024-04-21 RX ORDER — OXYCODONE HYDROCHLORIDE 5 MG/1
5 TABLET ORAL EVERY 4 HOURS PRN
Status: DISCONTINUED | OUTPATIENT
Start: 2024-04-21 | End: 2024-04-26 | Stop reason: HOSPADM

## 2024-04-21 RX ORDER — CEFAZOLIN SODIUM 2 G/50ML
2000 SOLUTION INTRAVENOUS EVERY 6 HOURS
Status: DISCONTINUED | OUTPATIENT
Start: 2024-04-21 | End: 2024-04-26 | Stop reason: HOSPADM

## 2024-04-21 RX ORDER — OXYCODONE HYDROCHLORIDE 10 MG/1
10 TABLET ORAL EVERY 4 HOURS PRN
Status: DISCONTINUED | OUTPATIENT
Start: 2024-04-21 | End: 2024-04-26 | Stop reason: HOSPADM

## 2024-04-21 RX ADMIN — CEFTRIAXONE 2000 MG: 2 INJECTION, SOLUTION INTRAVENOUS at 12:27

## 2024-04-21 RX ADMIN — ACETAMINOPHEN 325MG 650 MG: 325 TABLET ORAL at 16:48

## 2024-04-21 RX ADMIN — CEFAZOLIN SODIUM 2000 MG: 2 SOLUTION INTRAVENOUS at 22:11

## 2024-04-21 RX ADMIN — OXYCODONE HYDROCHLORIDE 5 MG: 5 TABLET ORAL at 19:25

## 2024-04-21 RX ADMIN — OXYCODONE HYDROCHLORIDE 10 MG: 10 TABLET ORAL at 22:31

## 2024-04-21 RX ADMIN — CEFAZOLIN SODIUM 2000 MG: 2 SOLUTION INTRAVENOUS at 16:43

## 2024-04-21 RX ADMIN — SODIUM CHLORIDE 1000 ML: 0.9 INJECTION, SOLUTION INTRAVENOUS at 12:27

## 2024-04-21 RX ADMIN — VANCOMYCIN HYDROCHLORIDE 2000 MG: 1 INJECTION, POWDER, LYOPHILIZED, FOR SOLUTION INTRAVENOUS at 13:16

## 2024-04-21 RX ADMIN — MORPHINE SULFATE 4 MG: 4 INJECTION INTRAVENOUS at 12:26

## 2024-04-21 NOTE — ASSESSMENT & PLAN NOTE
History of fatty liver. Elevated bilirubin which is chronic. Outpatient follow-up. Recommended dietary modification and weight loss.

## 2024-04-21 NOTE — LETTER
[facility]  [address] [address]  No information on file.    April 26, 2024     Patient: Nicolás Balderrama   YOB: 1980   Date of Visit: 4/21/2024       To Whom it May Concern:    Nicolás Balderrama is under my professional care. He was seen in the hospital from 4/21/2024 to 04/26/24. He may return to work on until cleared by PCP .    If you have any questions or concerns, please don't hesitate to call.         Sincerely,          Ashu Adler MD

## 2024-04-21 NOTE — Clinical Note
Case was discussed with  and the patient's admission status was agreed to be  to the service of Dr. Leonard

## 2024-04-21 NOTE — ED NOTES
Pt transported to the floor at this time. Tiger text sent to charge jeremy.      Isha Deleon RN  04/21/24 8991

## 2024-04-21 NOTE — ASSESSMENT & PLAN NOTE
Recurrent last time hospitalization prolonged course responded to high-dose Ancef will start Ancef 2 g IV every 6 hours  Venous duplex done negative for  Continue Lasix 20 mg daily  Elevate leg  Infectious disease consultation  No history of MRSA  Pain control Tylenol and oxycodone

## 2024-04-21 NOTE — ED PROVIDER NOTES
History  Chief Complaint   Patient presents with    Cellulitis     Patient diagnosed with cellulitis at urgent care and started on cephalexin. Patient advised to come to ED if it worsens.      Patient is a 44-year-old male who presents emerged from today with a chief complaint of right lower leg pain.  The patient has been having staying right leg swelling and pain with erythema over the last 3 days.  Was seen at urgent care and placed on Keflex.  Is taking this without relief.      Leg Pain  Location:  Leg  Time since incident:  3 days  Injury: no    Leg location:  R lower leg  Pain details:     Quality:  Throbbing    Onset quality:  Gradual    Timing:  Constant    Progression:  Worsening  Chronicity:  New  Dislocation: no    Foreign body present:  No foreign bodies  Tetanus status:  Unknown  Relieved by:  Nothing  Worsened by:  Nothing  Ineffective treatments:  Rest, ice and NSAIDs  Associated symptoms: no back pain and no decreased ROM    Risk factors: no frequent fractures        Prior to Admission Medications   Prescriptions Last Dose Informant Patient Reported? Taking?   clotrimazole (LOTRIMIN) 1 % cream   No No   Sig: Apply topically 2 (two) times a day   furosemide (LASIX) 20 mg tablet   No No   Sig: Take 1 tablet (20 mg total) by mouth daily   lisinopril (ZESTRIL) 20 mg tablet   No No   Sig: Take 1 tablet (20 mg total) by mouth daily   oxyCODONE (Roxicodone) 5 immediate release tablet   No No   Sig: Take 1 tablet (5 mg total) by mouth every 4 (four) hours as needed for moderate pain Max Daily Amount: 30 mg      Facility-Administered Medications: None       Past Medical History:   Diagnosis Date    Hypertension        History reviewed. No pertinent surgical history.    Family History   Problem Relation Age of Onset    Diabetes Mother     Hypertension Father     Kidney disease Father     Asthma Brother      I have reviewed and agree with the history as documented.    E-Cigarette/Vaping    E-Cigarette Use  Never User      E-Cigarette/Vaping Substances    Nicotine No     THC No     CBD No     Flavoring No     Other No     Unknown No      Social History     Tobacco Use    Smoking status: Never     Passive exposure: Never    Smokeless tobacco: Never   Vaping Use    Vaping status: Never Used   Substance Use Topics    Alcohol use: Not Currently    Drug use: Not Currently       Review of Systems   Musculoskeletal:  Negative for back pain.   All other systems reviewed and are negative.      Physical Exam  Physical Exam  Vitals and nursing note reviewed.   Constitutional:       General: He is in acute distress.      Appearance: He is well-developed.   HENT:      Head: Normocephalic and atraumatic.   Eyes:      Pupils: Pupils are equal, round, and reactive to light.   Cardiovascular:      Rate and Rhythm: Normal rate and regular rhythm.      Heart sounds: No murmur heard.  Pulmonary:      Effort: Pulmonary effort is normal. No respiratory distress.      Breath sounds: Normal breath sounds.   Abdominal:      General: Bowel sounds are normal.      Palpations: Abdomen is soft.      Tenderness: There is no abdominal tenderness.   Musculoskeletal:         General: Swelling and tenderness present.      Cervical back: Normal range of motion.   Skin:     General: Skin is warm and dry.      Capillary Refill: Capillary refill takes less than 2 seconds.   Neurological:      Mental Status: He is alert and oriented to person, place, and time.   Psychiatric:         Behavior: Behavior normal.         Vital Signs  ED Triage Vitals   Temperature Pulse Respirations Blood Pressure SpO2   04/21/24 1202 04/21/24 1202 04/21/24 1202 04/21/24 1202 04/21/24 1202   97.7 °F (36.5 °C) 100 18 140/90 97 %      Temp Source Heart Rate Source Patient Position - Orthostatic VS BP Location FiO2 (%)   04/21/24 1202 04/21/24 1202 04/21/24 1202 04/21/24 1202 --   Temporal Monitor Lying Right arm       Pain Score       04/21/24 1226       10 - Worst Possible Pain            Vitals:    04/21/24 1202   BP: 140/90   Pulse: 100   Patient Position - Orthostatic VS: Lying         Visual Acuity      ED Medications  Medications   sodium chloride 0.9 % bolus 1,000 mL (1,000 mL Intravenous New Bag 4/21/24 1227)   vancomycin (VANCOCIN) 2,000 mg in sodium chloride 0.9 % 500 mL IVPB (2,000 mg Intravenous New Bag 4/21/24 1316)   cefTRIAXone (ROCEPHIN) IVPB (premix in dextrose) 2,000 mg 50 mL (0 mg Intravenous Stopped 4/21/24 1257)   morphine injection 4 mg (4 mg Intravenous Given 4/21/24 1226)       Diagnostic Studies  Results Reviewed       Procedure Component Value Units Date/Time    Procalcitonin [[id number]]  (Abnormal) Collected: 04/21/24 1215    Lab Status: Final result Specimen: Blood from Arm, Right Updated: 04/21/24 1322     Procalcitonin 12.44 ng/ml     Manual Differential(PHLEBS Do Not Order) [[id number]]  (Abnormal) Collected: 04/21/24 1215    Lab Status: Final result Specimen: Blood from Arm, Right Updated: 04/21/24 1304     Segmented % 82 %      Bands % 13 %      Lymphocytes % 3 %      Monocytes % 1 %      Eosinophils % 0 %      Basophils % 0 %      Metamyelocytes % 1 %      Absolute Neutrophils 15.89 Thousand/uL      Absolute Lymphocytes 0.50 Thousand/uL      Absolute Monocytes 0.17 Thousand/uL      Absolute Eosinophils 0.00 Thousand/uL      Absolute Basophils 0.00 Thousand/uL      Absolute Metamyelocytes 0.17 Thousand/uL      Total Counted --     Toxic Vacuolated Neutrophils Present     RBC Morphology Present     Platelet Estimate Adequate     Giant PLTs Present     Anisocytosis Present     Microcytes Present    Lactic acid [[id number]]  (Normal) Collected: 04/21/24 1215    Lab Status: Final result Specimen: Blood from Arm, Right Updated: 04/21/24 1258     LACTIC ACID 0.9 mmol/L     Narrative:      Result may be elevated if tourniquet was used during collection.    Comprehensive metabolic panel [[id number]]  (Abnormal) Collected: 04/21/24 1215    Lab Status: Final result  Specimen: Blood from Arm, Right Updated: 04/21/24 1258     Sodium 135 mmol/L      Potassium 4.1 mmol/L      Chloride 102 mmol/L      CO2 27 mmol/L      ANION GAP 6 mmol/L      BUN 17 mg/dL      Creatinine 1.20 mg/dL      Glucose 93 mg/dL      Calcium 9.4 mg/dL      AST 38 U/L      ALT 39 U/L      Alkaline Phosphatase 102 U/L      Total Protein 7.9 g/dL      Albumin 3.9 g/dL      Total Bilirubin 2.34 mg/dL      eGFR 72 ml/min/1.73sq m     Narrative:      National Kidney Disease Foundation guidelines for Chronic Kidney Disease (CKD):     Stage 1 with normal or high GFR (GFR > 90 mL/min/1.73 square meters)    Stage 2 Mild CKD (GFR = 60-89 mL/min/1.73 square meters)    Stage 3A Moderate CKD (GFR = 45-59 mL/min/1.73 square meters)    Stage 3B Moderate CKD (GFR = 30-44 mL/min/1.73 square meters)    Stage 4 Severe CKD (GFR = 15-29 mL/min/1.73 square meters)    Stage 5 End Stage CKD (GFR <15 mL/min/1.73 square meters)  Note: GFR calculation is accurate only with a steady state creatinine    Protime-INR [[id number]]  (Abnormal) Collected: 04/21/24 1215    Lab Status: Final result Specimen: Blood from Arm, Right Updated: 04/21/24 1252     Protime 15.5 seconds      INR 1.20    APTT [[id number]]  (Abnormal) Collected: 04/21/24 1215    Lab Status: Final result Specimen: Blood from Arm, Right Updated: 04/21/24 1252     PTT 41 seconds     CBC and differential [[id number]]  (Abnormal) Collected: 04/21/24 1215    Lab Status: Final result Specimen: Blood from Arm, Right Updated: 04/21/24 1241     WBC 16.73 Thousand/uL      RBC 5.09 Million/uL      Hemoglobin 14.6 g/dL      Hematocrit 43.7 %      MCV 86 fL      MCH 28.7 pg      MCHC 33.4 g/dL      RDW 13.1 %      MPV 11.5 fL      Platelets 206 Thousands/uL     Blood culture #1 [[id number]] Collected: 04/21/24 1215    Lab Status: In process Specimen: Blood from Arm, Right Updated: 04/21/24 1234    Blood culture #2 [[id number]] Collected: 04/21/24 1227    Lab Status: In process Specimen:  Blood from Arm, Right Updated: 04/21/24 1233                   VAS VENOUS DUPLEX -LOWER LIMB UNILATERAL    (Results Pending)              Procedures  Procedures         ED Course  ED Course as of 04/21/24 1326   Sun Apr 21, 2024   1237 WBC(!): 16.73   1252 WBC(!): 16.73   1304 LACTIC ACID: 0.9   1312 Negative duplex                                              Medical Decision Making  Patient is a 44-year-old male who presents emerged from today with a chief complaint of right lower leg pain.  The patient has been having staying right leg swelling and pain with erythema over the last 3 days.  Was seen at urgent care and placed on Keflex.  Is taking this without relief.    Has cellulitis on examination.  Duplex study was negative for any DVT.  Patient was excepted to medicine service for admission due to failure of outpatient treatment for cellulitis.  Was given IV antibiotics and obtain blood cultures.  Patient agreement treatment plan.    Differential diagnosis included but was not limited to bacteremia, cellulitis, DVT, abscess, dehydration    Amount and/or Complexity of Data Reviewed  Labs: ordered. Decision-making details documented in ED Course.  Radiology: ordered and independent interpretation performed. Decision-making details documented in ED Course.  Discussion of management or test interpretation with external provider(s): Dr. Collins     Risk  Prescription drug management.  Decision regarding hospitalization.             Disposition  Final diagnoses:   Cellulitis of right lower leg     Time reflects when diagnosis was documented in both MDM as applicable and the Disposition within this note       Time User Action Codes Description Comment    4/21/2024 12:45 PM Fede Desir Add [L03.115] Cellulitis of right lower leg           ED Disposition       ED Disposition   Admit    Condition   Stable    Date/Time   Sun Apr 21, 2024  1:12 PM    Comment   Case was discussed with  Karina and the patient's  inpatient admission status was agreed to be  to the service of Dr. Collins               Follow-up Information    None         Patient's Medications   Discharge Prescriptions    No medications on file       No discharge procedures on file.    PDMP Review         Value Time User    PDMP Reviewed  Yes 3/18/2024 12:32 PM KELLI Palacios            ED Provider  Electronically Signed by             Fede Desir PA-C  04/21/24 7237

## 2024-04-21 NOTE — PLAN OF CARE
Problem: PAIN - ADULT  Goal: Verbalizes/displays adequate comfort level or baseline comfort level  Description: Interventions:  - Encourage patient to monitor pain and request assistance  - Assess pain using appropriate pain scale  - Administer analgesics based on type and severity of pain and evaluate response  - Implement non-pharmacological measures as appropriate and evaluate response  - Consider cultural and social influences on pain and pain management  - Notify physician/advanced practitioner if interventions unsuccessful or patient reports new pain  Outcome: Progressing     Problem: INFECTION - ADULT  Goal: Absence or prevention of progression during hospitalization  Description: INTERVENTIONS:  - Assess and monitor for signs and symptoms of infection  - Monitor lab/diagnostic results  - Monitor all insertion sites, i.e. indwelling lines, tubes, and drains  - Monitor endotracheal if appropriate and nasal secretions for changes in amount and color  - [place] appropriate cooling/warming therapies per order  - Administer medications as ordered  - Instruct and encourage patient and family to use good hand hygiene technique  - Identify and instruct in appropriate isolation precautions for identified infection/condition  Outcome: Progressing

## 2024-04-21 NOTE — ED NOTES
Time zero alert, PA made aware. No sepsis alert at this time     Anastacia Michelle, RN  04/21/24 8933

## 2024-04-22 LAB
ALBUMIN SERPL BCP-MCNC: 3.3 G/DL (ref 3.5–5)
ALP SERPL-CCNC: 82 U/L (ref 34–104)
ALT SERPL W P-5'-P-CCNC: 28 U/L (ref 7–52)
ANION GAP SERPL CALCULATED.3IONS-SCNC: 7 MMOL/L (ref 4–13)
AST SERPL W P-5'-P-CCNC: 24 U/L (ref 13–39)
BASOPHILS # BLD AUTO: 0.02 THOUSANDS/ÂΜL (ref 0–0.1)
BASOPHILS NFR BLD AUTO: 0 % (ref 0–1)
BILIRUB SERPL-MCNC: 1.66 MG/DL (ref 0.2–1)
BUN SERPL-MCNC: 13 MG/DL (ref 5–25)
CALCIUM ALBUM COR SERPL-MCNC: 9.2 MG/DL (ref 8.3–10.1)
CALCIUM SERPL-MCNC: 8.6 MG/DL (ref 8.4–10.2)
CHLORIDE SERPL-SCNC: 102 MMOL/L (ref 96–108)
CO2 SERPL-SCNC: 24 MMOL/L (ref 21–32)
CREAT SERPL-MCNC: 0.93 MG/DL (ref 0.6–1.3)
EOSINOPHIL # BLD AUTO: 0.01 THOUSAND/ÂΜL (ref 0–0.61)
EOSINOPHIL NFR BLD AUTO: 0 % (ref 0–6)
ERYTHROCYTE [DISTWIDTH] IN BLOOD BY AUTOMATED COUNT: 13.2 % (ref 11.6–15.1)
GFR SERPL CREATININE-BSD FRML MDRD: 98 ML/MIN/1.73SQ M
GLUCOSE SERPL-MCNC: 99 MG/DL (ref 65–140)
HCT VFR BLD AUTO: 38.5 % (ref 36.5–49.3)
HGB BLD-MCNC: 13.1 G/DL (ref 12–17)
IMM GRANULOCYTES # BLD AUTO: 0.06 THOUSAND/UL (ref 0–0.2)
IMM GRANULOCYTES NFR BLD AUTO: 1 % (ref 0–2)
LYMPHOCYTES # BLD AUTO: 1.25 THOUSANDS/ÂΜL (ref 0.6–4.47)
LYMPHOCYTES NFR BLD AUTO: 11 % (ref 14–44)
MCH RBC QN AUTO: 29.1 PG (ref 26.8–34.3)
MCHC RBC AUTO-ENTMCNC: 34 G/DL (ref 31.4–37.4)
MCV RBC AUTO: 86 FL (ref 82–98)
MONOCYTES # BLD AUTO: 0.69 THOUSAND/ÂΜL (ref 0.17–1.22)
MONOCYTES NFR BLD AUTO: 6 % (ref 4–12)
NEUTROPHILS # BLD AUTO: 9.58 THOUSANDS/ÂΜL (ref 1.85–7.62)
NEUTS SEG NFR BLD AUTO: 82 % (ref 43–75)
NRBC BLD AUTO-RTO: 0 /100 WBCS
PLATELET # BLD AUTO: 169 THOUSANDS/UL (ref 149–390)
PMV BLD AUTO: 11.5 FL (ref 8.9–12.7)
POTASSIUM SERPL-SCNC: 3.7 MMOL/L (ref 3.5–5.3)
PROT SERPL-MCNC: 6.6 G/DL (ref 6.4–8.4)
RBC # BLD AUTO: 4.5 MILLION/UL (ref 3.88–5.62)
SODIUM SERPL-SCNC: 133 MMOL/L (ref 135–147)
WBC # BLD AUTO: 11.61 THOUSAND/UL (ref 4.31–10.16)

## 2024-04-22 PROCEDURE — 99232 SBSQ HOSP IP/OBS MODERATE 35: CPT | Performed by: FAMILY MEDICINE

## 2024-04-22 PROCEDURE — 80053 COMPREHEN METABOLIC PANEL: CPT | Performed by: FAMILY MEDICINE

## 2024-04-22 PROCEDURE — 99255 IP/OBS CONSLTJ NEW/EST HI 80: CPT | Performed by: INTERNAL MEDICINE

## 2024-04-22 PROCEDURE — 85025 COMPLETE CBC W/AUTO DIFF WBC: CPT | Performed by: FAMILY MEDICINE

## 2024-04-22 RX ADMIN — CEFAZOLIN SODIUM 2000 MG: 2 SOLUTION INTRAVENOUS at 04:34

## 2024-04-22 RX ADMIN — MORPHINE SULFATE 4 MG: 4 INJECTION INTRAVENOUS at 08:25

## 2024-04-22 RX ADMIN — FUROSEMIDE 20 MG: 20 TABLET ORAL at 08:25

## 2024-04-22 RX ADMIN — OXYCODONE HYDROCHLORIDE 10 MG: 10 TABLET ORAL at 21:44

## 2024-04-22 RX ADMIN — LISINOPRIL 20 MG: 20 TABLET ORAL at 08:26

## 2024-04-22 RX ADMIN — CEFAZOLIN SODIUM 2000 MG: 2 SOLUTION INTRAVENOUS at 08:26

## 2024-04-22 RX ADMIN — OXYCODONE HYDROCHLORIDE 10 MG: 10 TABLET ORAL at 04:36

## 2024-04-22 RX ADMIN — CEFAZOLIN SODIUM 2000 MG: 2 SOLUTION INTRAVENOUS at 15:03

## 2024-04-22 RX ADMIN — OXYCODONE HYDROCHLORIDE 10 MG: 10 TABLET ORAL at 15:03

## 2024-04-22 RX ADMIN — ACETAMINOPHEN 325MG 650 MG: 325 TABLET ORAL at 18:03

## 2024-04-22 RX ADMIN — CEFAZOLIN SODIUM 2000 MG: 2 SOLUTION INTRAVENOUS at 21:38

## 2024-04-22 NOTE — PROGRESS NOTES
[facility]  Progress Note  Name: Nicolás Balderrama I  MRN: [id number]  Unit/Bed#: -01 I Date of Admission: 4/21/2024   Date of Service: 4/22/2024 I Hospital Day: 1    Assessment/Plan   * Cellulitis of right lower extremity  Assessment & Plan  Recurrent last time hospitalization prolonged course responded to high-dose Ancef will start Ancef 2 g IV every 6 hours  Venous duplex done negative for  Continue Lasix 20 mg daily  Elevate leg  Infectious disease consultation  No history of MRSA  Pain control Tylenol and oxycodone    Morbid obesity (HCC)  Assessment & Plan  Counseled    Essential hypertension  Assessment & Plan  Continue lisinopril and Lasix    Elevated bilirubin  Assessment & Plan  History of fatty liver. Elevated bilirubin which is chronic. Outpatient follow-up. Recommended dietary modification and weight loss.                  VTE Pharmacologic Prophylaxis: VTE Score: 2 Low Risk (Score 0-2) - Encourage Ambulation.    Mobility:   Basic Mobility Inpatient Raw Score: 24  JH-HLM Goal: 8: Walk 250 feet or more  JH-HLM Achieved: 8: Walk 250 feet ot more  JH-HLM Goal achieved. Continue to encourage appropriate mobility.    Patient Centered Rounds: I performed bedside rounds with nursing staff today.   Discussions with Specialists or Other Care Team Provider: ID    Education and Discussions with Family / Patient: Updated  (wife) at bedside.    Total Time Spent on Date of Encounter in care of patient: >35 mins. This time was spent on one or more of the following: performing physical exam; counseling and coordination of care; obtaining or reviewing history; documenting in the medical record; reviewing/ordering tests, medications or procedures; communicating with other healthcare professionals and discussing with patient's family/caregivers.    Current Length of Stay: 1 day(s)  Current Patient Status: Inpatient   Certification Statement: The patient will continue to require  additional inpatient hospital stay due to right lower extremity  cellulitis  Discharge Plan: Anticipate discharge in >72 hrs to home.    Code Status: Level 1 - Full Code    Subjective:   Seen and examined leg has pain time to time    Objective:     Vitals:   Temp (24hrs), Av.5 °F (36.4 °C), Min:97.3 °F (36.3 °C), Max:97.7 °F (36.5 °C)    Temp:  [97.3 °F (36.3 °C)-97.7 °F (36.5 °C)] 97.3 °F (36.3 °C)  HR:  [] 97  Resp:  [18-20] 18  BP: (117-144)/(88-97) 144/97  SpO2:  [94 %-98 %] 94 %  Body mass index is 55.93 kg/m².     Input and Output Summary (last 24 hours):     Intake/Output Summary (Last 24 hours) at 2024 1123  Last data filed at 2024 0900  Gross per 24 hour   Intake 640 ml   Output --   Net 640 ml       Physical Exam:   Physical Exam  Vitals and nursing note reviewed.   Constitutional:       General: He is not in acute distress.     Appearance: He is well-developed.   HENT:      Head: Normocephalic and atraumatic.   Eyes:      Conjunctiva/sclera: Conjunctivae normal.   Cardiovascular:      Rate and Rhythm: Normal rate and regular rhythm.      Heart sounds: No murmur heard.  Pulmonary:      Effort: Pulmonary effort is normal. No respiratory distress.      Breath sounds: Normal breath sounds. No wheezing or rales.   Abdominal:      General: There is no distension.      Palpations: Abdomen is soft.      Tenderness: There is no abdominal tenderness.   Musculoskeletal:      Cervical back: Neck supple.      Right lower leg: Edema present.   Skin:     General: Skin is warm and dry.      Capillary Refill: Capillary refill takes less than 2 seconds.      Findings: Erythema present.   Neurological:      Mental Status: He is alert.   Psychiatric:         Mood and Affect: Mood normal.          Additional Data:     Labs:  Results from last 7 days   Lab Units 24  0437 24  1215   WBC Thousand/uL 11.61* 16.73*   HEMOGLOBIN g/dL 13.1 14.6   HEMATOCRIT % 38.5 43.7   PLATELETS Thousands/uL 169 206    BANDS PCT %  --  13*   SEGS PCT % 82*  --    LYMPHO PCT % 11* 3*   MONO PCT % 6 1*   EOS PCT % 0 0     Results from last 7 days   Lab Units 04/22/24  0437   SODIUM mmol/L 133*   POTASSIUM mmol/L 3.7   CHLORIDE mmol/L 102   CO2 mmol/L 24   BUN mg/dL 13   CREATININE mg/dL 0.93   ANION GAP mmol/L 7   CALCIUM mg/dL 8.6   ALBUMIN g/dL 3.3*   TOTAL BILIRUBIN mg/dL 1.66*   ALK PHOS U/L 82   ALT U/L 28   AST U/L 24   GLUCOSE RANDOM mg/dL 99     Results from last 7 days   Lab Units 04/21/24  1215   INR  1.20*             Results from last 7 days   Lab Units 04/21/24  1215   LACTIC ACID mmol/L 0.9   PROCALCITONIN ng/ml 12.44*       Lines/Drains:  Invasive Devices       Peripheral Intravenous Line  Duration             Peripheral IV 04/21/24 Distal;Right;Upper;Ventral (anterior) Arm <1 day                          Imaging: Reviewed radiology reports from this admission including: ultrasound(s)    Recent Cultures (last 7 days):   Results from last 7 days   Lab Units 04/21/24  1227 04/21/24  1215   BLOOD CULTURE  Received in Microbiology Lab. Culture in Progress. Received in Microbiology Lab. Culture in Progress.       Last 24 Hours Medication List:   Current Facility-Administered Medications   Medication Dose Route Frequency Provider Last Rate    acetaminophen  650 mg Oral Q6H PRN Katey Collins MD      cefazolin  2,000 mg Intravenous Q6H Katey Collins MD 2,000 mg (04/22/24 0826)    enoxaparin  60 mg Subcutaneous Q12H [facility] Katey Collins MD      furosemide  20 mg Oral Daily Katey Collins MD      lisinopril  20 mg Oral Daily Katey Collins MD      morphine injection  4 mg Intravenous Q2H PRN Leonardo Rueter, DO      oxyCODONE  5 mg Oral Q4H PRN Leonardo Rueter, DO      Or    oxyCODONE  10 mg Oral Q4H PRN Leonardo Rueter, DO          Today, Patient Was Seen By: Katey Collins MD    **Please Note: This note may have been constructed using a voice recognition system.**

## 2024-04-22 NOTE — PLAN OF CARE
Problem: SKIN/TISSUE INTEGRITY - ADULT  Goal: Skin Integrity remains intact(Skin Breakdown Prevention)  Description: Assess:  -Perform Lauri assessment every shift  -Clean and moisturize skin every shift  -Inspect skin when repositioning, toileting, and assisting with ADLS  -Assess under medical devices   -Assess extremities for adequate circulation and sensation     Bed Management:  -Have minimal linens on bed & keep smooth, unwrinkled  -Change linens as needed when moist or perspiring  -Avoid sitting or lying in one position for more than 2 hours while in bed  -Keep HOB at 30 degrees     Toileting:  -Offer bedside commode  -Assess for incontinence every shift  -Use incontinent care products after each incontinent episode     Activity:  -Mobilize patient 3 times a day  -Encourage activity and walks on unit  -Encourage or provide ROM exercises   -Turn and reposition patient every 2 Hours  -Use appropriate equipment to lift or move patient in bed  -Consider limitation of chair time 2 hour intervals    Skin Care:  -Avoid use of baby powder, tape, friction and shearing, hot water or constrictive clothing  -Relieve pressure over bony prominences   -Do not massage red bony areas    Next Steps:  -Teach patient strategies to minimize risks   -Consider consults to  interdisciplinary teams  Outcome: Progressing     Problem: PAIN - ADULT  Goal: Verbalizes/displays adequate comfort level or baseline comfort level  Description: Interventions:  - Encourage patient to monitor pain and request assistance  - Assess pain using appropriate pain scale  - Administer analgesics based on type and severity of pain and evaluate response  - Implement non-pharmacological measures as appropriate and evaluate response  - Consider cultural and social influences on pain and pain management  - Notify physician/advanced practitioner if interventions unsuccessful or patient reports new pain  Outcome: Progressing     Problem: INFECTION -  ADULT  Goal: Absence or prevention of progression during hospitalization  Description: INTERVENTIONS:  - Assess and monitor for signs and symptoms of infection  - Monitor lab/diagnostic results  - Monitor all insertion sites, i.e. indwelling lines, tubes, and drains  - Monitor endotracheal if appropriate and nasal secretions for changes in amount and color  - [place] appropriate cooling/warming therapies per order  - Administer medications as ordered  - Instruct and encourage patient and family to use good hand hygiene technique  - Identify and instruct in appropriate isolation precautions for identified infection/condition  Outcome: Progressing     Problem: SAFETY ADULT  Goal: Patient will remain free of falls  Description: INTERVENTIONS:  - Educate patient/family on patient safety including physical limitations  - Instruct patient to call for assistance with activity   - Consult OT/PT to assist with strengthening/mobility   - Keep Call bell within reach  - Keep bed low and locked with side rails adjusted as appropriate  - Keep care items and personal belongings within reach  - Initiate and maintain comfort rounds  - Make Fall Risk Sign visible to staff  - Offer Toileting every 2 Hours, in advance of need  - Initiate/Maintain bed/chair alarm  - Obtain necessary fall risk management equipment  - Apply yellow socks and bracelet for high fall risk patients  - Consider moving patient to room near nurses station  Outcome: Progressing  Goal: Maintain or return to baseline ADL function  Description: INTERVENTIONS:  -  Assess patient's ability to carry out ADLs; assess patient's baseline for ADL function and identify physical deficits which impact ability to perform ADLs (bathing, care of mouth/teeth, toileting, grooming, dressing, etc.)  - Assess/evaluate cause of self-care deficits   - Assess range of motion  - Assess patient's mobility; develop plan if impaired  - Assess patient's need for assistive devices and  provide as appropriate  - Encourage maximum independence but intervene and supervise when necessary  - Involve family in performance of ADLs  - Assess for home care needs following discharge   - Consider OT consult to assist with ADL evaluation and planning for discharge  - Provide patient education as appropriate  Outcome: Progressing  Goal: Maintains/Returns to pre admission functional level  Description: INTERVENTIONS:  - Perform AM-PAC 6 Click Basic Mobility/ Daily Activity assessment daily.  - Set and communicate daily mobility goal to care team and patient/family/caregiver.   - Collaborate with rehabilitation services on mobility goals if consulted  - Reposition patient every 2 hours.  - Stand patient 3 times a day  - Ambulate patient 3 times a day  - Out of bed to chair 3 times a day   - Out of bed for meals 3 times a day  - Out of bed for toileting  - Record patient progress and toleration of activity level   Outcome: Progressing     Problem: DISCHARGE PLANNING  Goal: Discharge to home or other facility with appropriate resources  Description: INTERVENTIONS:  - Identify barriers to discharge w/patient and caregiver  - Arrange for needed discharge resources and transportation as appropriate  - Identify discharge learning needs (meds, wound care, etc.)  - Arrange for interpretive services to assist at discharge as needed  - Refer to Case Management Department for coordinating discharge planning if the patient needs post-hospital services based on physician/advanced practitioner order or complex needs related to functional status, cognitive ability, or social support system  Outcome: Progressing     Problem: Knowledge Deficit  Goal: Patient/family/caregiver demonstrates understanding of disease process, treatment plan, medications, and discharge instructions  Description: Complete learning assessment and assess knowledge base.  Interventions:  - Provide teaching at level of understanding  - Provide teaching via  preferred learning methods  Outcome: Progressing

## 2024-04-22 NOTE — PLAN OF CARE
Problem: SKIN/TISSUE INTEGRITY - ADULT  Goal: Skin Integrity remains intact(Skin Breakdown Prevention)  Description: Assess:  -Perform Lauri assessment every   -Clean and moisturize skin every   -Inspect skin when repositioning, toileting, and assisting with ADLS  -Assess under medical devices such as  every   -Assess extremities for adequate circulation and sensation     Bed Management:  -Have minimal linens on bed & keep smooth, unwrinkled  -Change linens as needed when moist or perspiring  -Avoid sitting or lying in one position for more than  hours while in bed  -Keep HOB at degrees     Toileting:  -Offer bedside commode  -Assess for incontinence every   -Use incontinent care products after each incontinent episode such as     Activity:  -Mobilize patient  times a day  -Encourage activity and walks on unit  -Encourage or provide ROM exercises   -Turn and reposition patient every  Hours  -Use appropriate equipment to lift or move patient in bed  -Instruct/ Assist with weight shifting every  when out of bed in chair  -Consider limitation of chair time  hour intervals    Skin Care:  -Avoid use of baby powder, tape, friction and shearing, hot water or constrictive clothing  -Relieve pressure over bony prominences using  -Do not massage red bony areas    Next Steps:  -Teach patient strategies to minimize risks such as    -Consider consults to  interdisciplinary teams such as   Outcome: Progressing     Problem: PAIN - ADULT  Goal: Verbalizes/displays adequate comfort level or baseline comfort level  Description: Interventions:  - Encourage patient to monitor pain and request assistance  - Assess pain using appropriate pain scale  - Administer analgesics based on type and severity of pain and evaluate response  - Implement non-pharmacological measures as appropriate and evaluate response  - Consider cultural and social influences on pain and pain management  - Notify physician/advanced practitioner if interventions  unsuccessful or patient reports new pain  Outcome: Progressing     Problem: INFECTION - ADULT  Goal: Absence or prevention of progression during hospitalization  Description: INTERVENTIONS:  - Assess and monitor for signs and symptoms of infection  - Monitor lab/diagnostic results  - Monitor all insertion sites, i.e. indwelling lines, tubes, and drains  - Monitor endotracheal if appropriate and nasal secretions for changes in amount and color  - [place] appropriate cooling/warming therapies per order  - Administer medications as ordered  - Instruct and encourage patient and family to use good hand hygiene technique  - Identify and instruct in appropriate isolation precautions for identified infection/condition  Outcome: Progressing     Problem: SAFETY ADULT  Goal: Patient will remain free of falls  Description: INTERVENTIONS:  - Educate patient/family on patient safety including physical limitations  - Instruct patient to call for assistance with activity   - Consult OT/PT to assist with strengthening/mobility   - Keep Call bell within reach  - Keep bed low and locked with side rails adjusted as appropriate  - Keep care items and personal belongings within reach  - Initiate and maintain comfort rounds  - Make Fall Risk Sign visible to staff  - Offer Toileting every 2 Hours, in advance of need  - Initiate/Maintain bed alarm  - Obtain necessary fall risk management equipment  - Apply yellow socks and bracelet for high fall risk patients  - Consider moving patient to room near nurses station  Outcome: Progressing  Goal: Maintain or return to baseline ADL function  Description: INTERVENTIONS:  -  Assess patient's ability to carry out ADLs; assess patient's baseline for ADL function and identify physical deficits which impact ability to perform ADLs (bathing, care of mouth/teeth, toileting, grooming, dressing, etc.)  - Assess/evaluate cause of self-care deficits   - Assess range of motion  - Assess patient's mobility;  develop plan if impaired  - Assess patient's need for assistive devices and provide as appropriate  - Encourage maximum independence but intervene and supervise when necessary  - Involve family in performance of ADLs  - Assess for home care needs following discharge   - Consider OT consult to assist with ADL evaluation and planning for discharge  - Provide patient education as appropriate  Outcome: Progressing  Goal: Maintains/Returns to pre admission functional level  Description: INTERVENTIONS:  - Perform AM-PAC 6 Click Basic Mobility/ Daily Activity assessment daily.  - Set and communicate daily mobility goal to care team and patient/family/caregiver.   - Collaborate with rehabilitation services on mobility goals if consulted  - Perform Range of Motion 3 times a day.  - Reposition patient every 2 hours.  - Dangle patient 3 times a day  - Stand patient 3 times a day  - Ambulate patient 3 times a day  - Out of bed to chair 3 times a day   - Out of bed for meals 3 times a day  - Out of bed for toileting  - Record patient progress and toleration of activity level   Outcome: Progressing     Problem: DISCHARGE PLANNING  Goal: Discharge to home or other facility with appropriate resources  Description: INTERVENTIONS:  - Identify barriers to discharge w/patient and caregiver  - Arrange for needed discharge resources and transportation as appropriate  - Identify discharge learning needs (meds, wound care, etc.)  - Arrange for interpretive services to assist at discharge as needed  - Refer to Case Management Department for coordinating discharge planning if the patient needs post-hospital services based on physician/advanced practitioner order or complex needs related to functional status, cognitive ability, or social support system  Outcome: Progressing     Problem: Knowledge Deficit  Goal: Patient/family/caregiver demonstrates understanding of disease process, treatment plan, medications, and discharge  instructions  Description: Complete learning assessment and assess knowledge base.  Interventions:  - Provide teaching at level of understanding  - Provide teaching via preferred learning methods  Outcome: Progressing

## 2024-04-22 NOTE — CONSULTS
Consultation - Infectious Disease   Nicolás Balderrama 44 y.o. male MRN: [id number]  Unit/Bed#: -01 Encounter: [id number]      Inpatient consult to Infectious Diseases  Consult performed by: Tiff Contreras MD  Consult ordered by: Katey Collins MD            REQUIRED DOCUMENTATION:     1. This service was provided via Telemedicine.  2. Provider located at [place].  3. TeleMed provider: Tiff Contreras MD.  4. Identify all parties in room with patient during tele consult:RN  5. After connecting through [facility], patient was identified by name and date of birth and assistant checked wristband.  Patient was then informed that this was a Telemedicine visit and that the exam was being conducted confidentially over secure lines. My office door was closed. No one else was in the room.  Patient acknowledged consent and understanding of privacy and security of the Telemedicine visit, and gave us permission to have the assistant stay in the room in order to assist with the history and to conduct the exam.  I informed the patient that I have reviewed their record in Epic and presented the opportunity for them to ask any questions regarding the visit today.  The patient agreed to participate.       Assessment/Recommendations     Sepsis, POA  Recurrent right leg cellulitis  Morbid obesity  Chronic venous edema  History of tinea pedis    - Patient presents with recurrent cellulitis, sepsis in the setting of morbid obesity, chronic edema .  This is patient's second hospitalization in a year for the symptoms and fifth reported episode in the past year.  Patient's job involves prolonged standing and he denies wearing compression stockings which is the likely predisposing factor for his recurrent infection  - Afebrile with improving leukocytosis, anticipate slow improvement due to extensive edema/poor antibiotic tissue absorption    Continue cefazolin 2 every 6  Follow-up blood cultures  Consider trial of IV diuretics,  check TTE  Check daily CBC, CMP while inpatient to monitor for any evolving antibiotic toxicity or treatment failure  Serial extremity exams, consider Ace wrap when patient can tolerate, continue lower extremity skin care and limb elevation  Discussed natural history of cellulitis and discussed with patient that he is at risk for recurrent cellulitis/bacteremia if underlying risk factors cannot be modified, stressed the importance of weight loss, avoiding prolonged sitting or standing, skin and nail care and importance of compression stockings to aid with venous edema.  May consider offering him a trial of suppressive penicillin until underlying risk factors can be modified      Lower extremity Doppler images personally reviewed and show no DVT    Discussed above plan in detail with the primary service who is in agreement.    History     Reason for Consult: Cellulitis  HPI: Nicolás Balderrama is a 44 y.o. year old male with history of morbid obesity, chronic venous edema, hypertension and history of recurrent right leg cellulitis since 2019.  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 He was recently hospitalized in May last year with recurrent right lower extremity cellulitis in the setting of tinea pedis.  He says he has had 3 additional episodes of cellulitis since that time but they have resolved with oral antibiotics.  He returned to the ER yesterday with right leg pain, swelling and redness.  Symptoms began acutely with fever, chills, generalized malaise and he then noted that his right leg got intensely red, swollen and painful.  He denies any preceding trauma, recent cuts or boils, no recent issues with tinea pedis.  He did notice that 2 days ago his right leg was swelling up more at the end of his workday.  Infectious disease is being consulted for diagnostic work up and antibiotic management.    Review of Systems  Pertinent positives and negatives as noted in HPI. Rest complete 12 point  system-based review of systems is otherwise negative.    PAST MEDICAL HISTORY:  Past Medical History:   Diagnosis Date    Hypertension      History reviewed. No pertinent surgical history.    FAMILY HISTORY:  Non-contributory    SOCIAL HISTORY:  Social History   Single  Social History     Substance and Sexual Activity   Alcohol Use Not Currently     Social History     Substance and Sexual Activity   Drug Use Not Currently     Social History     Tobacco Use   Smoking Status Never    Passive exposure: Never   Smokeless Tobacco Never       ALLERGIES:  No Known Allergies    MEDICATIONS:  All current active medications have been reviewed.    Physical Exam     Temp:  [97.3 °F (36.3 °C)-97.7 °F (36.5 °C)] 97.3 °F (36.3 °C)  HR:  [] 97  Resp:  [18-20] 18  BP: (117-144)/(88-97) 144/97  SpO2:  [94 %-98 %] 94 %  Temp (24hrs), Av.5 °F (36.4 °C), Min:97.3 °F (36.3 °C), Max:97.7 °F (36.5 °C)  Current: Temperature: (!) 97.3 °F (36.3 °C)    Intake/Output Summary (Last 24 hours) at 2024 0858  Last data filed at 2024 0833  Gross per 24 hour   Intake 360 ml   Output --   Net 360 ml         Physical exam findings reported by bedside and primary medical team staff    General Appearance:  Appearing ill, nontoxic, and in some distress form pain, appears stated age   Head:  Normocephalic, without obvious abnormality, atraumatic   Eyes:  PERRL, conjunctiva pink and sclera anicteric, both eyes   Nose: Nares normal, mucosa normal, no drainage   Throat: Oropharynx moist without lesions; lips, mucosa, and tongue normal; teeth and gums normal   Neck: Supple, symmetrical, trachea midline, no adenopathy, no tenderness/mass/nodules   Back:   Symmetric, no curvature, ROM normal, no CVA tenderness   Lungs:   Clear to auscultation bilaterally, no audible wheezes, rhonchi and rales, respirations unlabored   Chest Wall:  No tenderness or deformity   Heart:  Regular rate and rhythm, S1, S2 normal, no murmur, rub or gallop   Abdomen:    Soft, non-tender, non-distended, positive bowel sounds, no masses, no organomegaly    No CVA tenderness   Extremities: Extremities normal, atraumatic, no cyanosis, clubbing , 2+ RLE edema   Skin: Skin color, texture, turgor normal, intense erythema RLE. Dry skin of both feet   Lymph nodes: Cervical, supraclavicular, and axillary nodes normal   Neurologic: Alert and oriented times 3       Invasive Devices:   Peripheral IV 04/21/24 Distal;Right;Upper;Ventral (anterior) Arm (Active)   Site Assessment WDL 04/21/24 1920   Dressing Type Transparent 04/21/24 1920   Line Status Flushed 04/21/24 1920   Dressing Status Clean;Dry;Intact 04/21/24 1920   Dressing Change Due 04/25/24 04/21/24 1920   Reason Not Rotated Not due 04/21/24 1920       Labs, Imaging, & Other Studies     Lab Results:    I have personally reviewed pertinent labs.    Results from last 7 days   Lab Units 04/22/24  0437 04/21/24  1215   WBC Thousand/uL 11.61* 16.73*   HEMOGLOBIN g/dL 13.1 14.6   PLATELETS Thousands/uL 169 206     Results from last 7 days   Lab Units 04/22/24  0437 04/21/24  1215   POTASSIUM mmol/L 3.7 4.1   CHLORIDE mmol/L 102 102   CO2 mmol/L 24 27   BUN mg/dL 13 17   CREATININE mg/dL 0.93 1.20   EGFR ml/min/1.73sq m 98 72   CALCIUM mg/dL 8.6 9.4   AST U/L 24 38   ALT U/L 28 39   ALK PHOS U/L 82 102     Results from last 7 days   Lab Units 04/21/24  1227 04/21/24  1215   BLOOD CULTURE  Received in Microbiology Lab. Culture in Progress. Received in Microbiology Lab. Culture in Progress.       Imaging Studies:   I have personally reviewed pertinent imaging study reports and images in PACS.      EKG, Pathology, and Other Studies:   I have personally reviewed pertinent reports and reviewed external records.    Counseling/Coordination of care:       Total 90 minutes communication with the patient via telehealth.  Labs, medical tests and imaging studies were independently and extensively reviewed by me as noted above in HPI and old records were  obtained and summarized as noted above in HPI.   My recommendations were discussed with the patient in detail who verbalized understanding.

## 2024-04-22 NOTE — UTILIZATION REVIEW
Initial Clinical Review    Admission: Date/Time/Statement:   Admission Orders (From admission, onward)       Ordered        04/21/24 1312  INPATIENT ADMISSION  Once                          Orders Placed This Encounter   Procedures    INPATIENT ADMISSION     Standing Status:   Standing     Number of Occurrences:   1     Order Specific Question:   Level of Care     Answer:   Med Surg [16]     Order Specific Question:   Estimated length of stay     Answer:   More than 2 Midnights     Order Specific Question:   Certification     Answer:   I certify that inpatient services are medically necessary for this patient for a duration of greater than two midnights. See H&P and MD Progress Notes for additional information about the patient's course of treatment.     ED Arrival Information       Expected   -    Arrival   4/21/2024 11:52    Acuity   Urgent              Means of arrival   Walk-In    Escorted by   Self    Service   Hospitalist    Admission type   Emergency              Arrival complaint   rt leg pain, pt states it is cellulitis             Chief Complaint   Patient presents with    Cellulitis     Patient diagnosed with cellulitis at urgent care and started on cephalexin. Patient advised to come to ED if it worsens.        Initial Presentation: 44 y.o. male to ED via walk-in from home  Present to ED with ecurrent left lower extremity cellulitis that started Wednesday they started him on Keflex at urgent care but it gotten worse so he presented here was admitted in May of last year with same.  No history of MRSA.  Has been taking his Lasix.   PMHX: hypertension, lower extremity edema   Admitted to [facility] with DX: Cellulitis of right lower extremity   on exam: tachy; hypertensive; Swelling (Right lower extremity see media pictures); WBC 16.73; Procal 12.44   PLAN: cont iv abx; monitor labs; pain / nausea control (see below); ID consult      Date: 4/22/24      Day 2  Complains of leg pain; RLE edema and erythema present;  WBC 11.61; Na 133; albumin 3.3 ;procal 12.44. of note, History of fatty liver. Elevated bilirubin which is chronic. Outpatient follow-up. Recommended dietary modification and weight loss.   Plan: cont iv abx; monitor labs; pain / nausea control (see below)      ID CONSULT   Sepsis, POA / Recurrent right leg cellulitis  Patient presents with recurrent cellulitis, sepsis in the setting of morbid obesity, chronic edema .  This is patient's second hospitalization in a year for the symptoms and fifth reported episode in the past year.  Patient's job involves prolonged standing and he denies wearing compression stockings which is the likely predisposing factor for his recurrent infection.   Afebrile with improving leukocytosis, anticipate slow improvement due to extensive edema/poor antibiotic tissue absorption  Plan: Continue cefazolin 2 every 6; Follow-up blood cultures; Consider trial of IV diuretics, check TTE; serial extremity exams          ED Triage Vitals   Temperature Pulse Respirations Blood Pressure SpO2   04/21/24 1202 04/21/24 1202 04/21/24 1202 04/21/24 1202 04/21/24 1202   97.7 °F (36.5 °C) 100 18 140/90 97 %      Temp Source Heart Rate Source Patient Position - Orthostatic VS BP Location FiO2 (%)   04/21/24 1202 04/21/24 1202 04/21/24 1202 04/21/24 1202 --   Temporal Monitor Lying Right arm       Pain Score       04/21/24 1226       10 - Worst Possible Pain          Wt Readings from Last 1 Encounters:   04/21/24 (!) 172 kg (378 lb 12 oz)     Additional Vital Signs:   Date/Time Temp Pulse Resp BP MAP (mmHg) SpO2 O2 Device Patient Position - Orthostatic VS   04/22/24 0900 -- -- -- -- -- -- None (Room air) --   04/22/24 07:15:13 97.3 °F (36.3 °C) Abnormal  97 18 144/97 113 94 % -- --   04/21/24 22:29:03 97.5 °F (36.4 °C) 98 20 117/88 98 98 % None (Room air) Lying   04/21/24 1920 -- -- -- -- -- 95 % None (Room air) --   04/21/24 14:03:54 -- 98 18 140/90 107 97 % -- --   04/21/24 1202 97.7 °F (36.5 °C) 100 18  140/90 -- 97 % None (Room air) Lying       EKG: None obtained      Pertinent Labs/Diagnostic Test Results:   VAS VENOUS DUPLEX -LOWER LIMB UNILATERAL   Final Result by Abran Hanley MD (04/21 1620)           Results from last 7 days   Lab Units 04/22/24  0437 04/21/24  1215   WBC Thousand/uL 11.61* 16.73*   HEMOGLOBIN g/dL 13.1 14.6   HEMATOCRIT % 38.5 43.7   PLATELETS Thousands/uL 169 206   TOTAL NEUT ABS Thousands/µL 9.58*  --    BANDS PCT %  --  13*        Results from last 7 days   Lab Units 04/22/24  0437 04/21/24  1215   SODIUM mmol/L 133* 135   POTASSIUM mmol/L 3.7 4.1   CHLORIDE mmol/L 102 102   CO2 mmol/L 24 27   ANION GAP mmol/L 7 6   BUN mg/dL 13 17   CREATININE mg/dL 0.93 1.20   EGFR ml/min/1.73sq m 98 72   CALCIUM mg/dL 8.6 9.4     Results from last 7 days   Lab Units 04/22/24  0437 04/21/24  1215   AST U/L 24 38   ALT U/L 28 39   ALK PHOS U/L 82 102   TOTAL PROTEIN g/dL 6.6 7.9   ALBUMIN g/dL 3.3* 3.9   TOTAL BILIRUBIN mg/dL 1.66* 2.34*        Results from last 7 days   Lab Units 04/22/24  0437 04/21/24  1215   GLUCOSE RANDOM mg/dL 99 93        Results from last 7 days   Lab Units 04/21/24  1215   PROTIME seconds 15.5*   INR  1.20*   PTT seconds 41*        Results from last 7 days   Lab Units 04/21/24  1215   PROCALCITONIN ng/ml 12.44*     Results from last 7 days   Lab Units 04/21/24  1215   LACTIC ACID mmol/L 0.9        Results from last 7 days   Lab Units 04/21/24  1227 04/21/24  1215   BLOOD CULTURE  Received in Microbiology Lab. Culture in Progress. Received in Microbiology Lab. Culture in Progress.       ED Treatment:   Medication Administration from 04/21/2024 1150 to 04/21/2024 1351         Date/Time Order Dose Route Action     04/21/2024 1227 EDT cefTRIAXone (ROCEPHIN) IVPB (premix in dextrose) 2,000 mg 50 mL 2,000 mg Intravenous New Bag     04/21/2024 1227 EDT sodium chloride 0.9 % bolus 1,000 mL 1,000 mL Intravenous New Bag     04/21/2024 1226 EDT morphine injection 4 mg 4 mg  Intravenous Given     04/21/2024 1316 EDT vancomycin (VANCOCIN) 2,000 mg in sodium chloride 0.9 % 500 mL IVPB 2,000 mg Intravenous New Bag            Present on Admission:   Cellulitis of right lower extremity   Elevated bilirubin   Morbid obesity (HCC)   Essential hypertension      Admitting Diagnosis: Cellulitis [L03.90]  Cellulitis of right lower leg [L03.115]    Age/Sex: 44 y.o. male    Admission Orders: SCDs; elevate extremity; regular diet    Scheduled Medications:  cefazolin, 2,000 mg, Intravenous, Q6H  enoxaparin, 60 mg, Subcutaneous, Q12H TRINI  furosemide, 20 mg, Oral, Daily  lisinopril, 20 mg, Oral, Daily      Continuous IV Infusions: None       PRN Meds:  acetaminophen, 650 mg, Oral, Q6H PRN  morphine injection, 4 mg, Intravenous, Q2H PRN  (4/22  rec'd x1 so far today)   oxyCODONE, 5 mg, Oral, Q4H PRN  (4/21 rec'd x1)    Or  oxyCODONE, 10 mg, Oral, Q4H PRN   (4/21 rec'd x1)   (4/22  rec'd x1 so far today)         IP CONSULT TO INFECTIOUS DISEASES    Network Utilization Review Department  ATTENTION: Please call with any questions or concerns to [phone] and carefully listen to the prompts so that you are directed to the right person. All voicemails are confidential.   For Discharge needs, contact Care Management DC Support Team at [phone] opt. 2  Send all requests for admission clinical reviews, approved or denied determinations and any other requests to dedicated fax number below belonging to the campus where the patient is receiving treatment. List of dedicated fax numbers for the Facilities:  FACILITY NAME UR FAX NUMBER   ADMISSION DENIALS (Administrative/Medical Necessity) [phone]   DISCHARGE SUPPORT TEAM (NETWORK) [phone]   PARENT CHILD HEALTH (Maternity/NICU/Pediatrics) [phone]   [facility] [phone]   [facility] [phone]   [facility] [phone]   [facility]  [phone]   [facility] [phone]   [facility] [phone]   [facility] [phone]   [facility] [phone]   [facility] [phone]   [facility] [phone]   [facility] [phone]   [facility] [phone]

## 2024-04-23 ENCOUNTER — APPOINTMENT (INPATIENT)
Dept: NON INVASIVE DIAGNOSTICS | Facility: HOSPITAL | Age: 44
DRG: 872 | End: 2024-04-23
Payer: COMMERCIAL

## 2024-04-23 PROBLEM — I10 ESSENTIAL HYPERTENSION: Status: RESOLVED | Noted: 2019-12-30 | Resolved: 2024-04-23

## 2024-04-23 LAB
ANION GAP SERPL CALCULATED.3IONS-SCNC: 6 MMOL/L (ref 4–13)
AORTIC ROOT: 3.7 CM
AORTIC VALVE MEAN VELOCITY: 8.3 M/S
ASCENDING AORTA: 3.9 CM
AV AREA BY CONTINUOUS VTI: 3.7 CM2
AV AREA PEAK VELOCITY: 3.8 CM2
AV LVOT MEAN GRADIENT: 1 MMHG
AV LVOT PEAK GRADIENT: 3 MMHG
AV MEAN GRADIENT: 3 MMHG
AV PEAK GRADIENT: 6 MMHG
AV VALVE AREA: 3.72 CM2
AV VELOCITY RATIO: 0.77
BASOPHILS # BLD AUTO: 0.03 THOUSANDS/ÂΜL (ref 0–0.1)
BASOPHILS NFR BLD AUTO: 0 % (ref 0–1)
BSA FOR ECHO PROCEDURE: 2.71 M2
BUN SERPL-MCNC: 10 MG/DL (ref 5–25)
CALCIUM SERPL-MCNC: 8.8 MG/DL (ref 8.4–10.2)
CHLORIDE SERPL-SCNC: 102 MMOL/L (ref 96–108)
CO2 SERPL-SCNC: 28 MMOL/L (ref 21–32)
CREAT SERPL-MCNC: 0.84 MG/DL (ref 0.6–1.3)
DOP CALC AO PEAK VEL: 1.18 M/S
DOP CALC AO VTI: 23.9 CM
DOP CALC LVOT AREA: 4.91 CM2
DOP CALC LVOT CARDIAC INDEX: 2.58 L/MIN/M2
DOP CALC LVOT CARDIAC OUTPUT: 7.01 L/MIN
DOP CALC LVOT DIAMETER: 2.5 CM
DOP CALC LVOT PEAK VEL VTI: 18.13 CM
DOP CALC LVOT PEAK VEL: 0.91 M/S
DOP CALC LVOT STROKE INDEX: 31.7 ML/M2
DOP CALC LVOT STROKE VOLUME: 88.95
E WAVE DECELERATION TIME: 149 MS
E/A RATIO: 0.84
EOSINOPHIL # BLD AUTO: 0.18 THOUSAND/ÂΜL (ref 0–0.61)
EOSINOPHIL NFR BLD AUTO: 2 % (ref 0–6)
ERYTHROCYTE [DISTWIDTH] IN BLOOD BY AUTOMATED COUNT: 13.1 % (ref 11.6–15.1)
FRACTIONAL SHORTENING: 20 (ref 28–44)
GFR SERPL CREATININE-BSD FRML MDRD: 105 ML/MIN/1.73SQ M
GLUCOSE SERPL-MCNC: 94 MG/DL (ref 65–140)
HCT VFR BLD AUTO: 42.3 % (ref 36.5–49.3)
HGB BLD-MCNC: 14.1 G/DL (ref 12–17)
IMM GRANULOCYTES # BLD AUTO: 0.05 THOUSAND/UL (ref 0–0.2)
IMM GRANULOCYTES NFR BLD AUTO: 1 % (ref 0–2)
INTERVENTRICULAR SEPTUM IN DIASTOLE (PARASTERNAL SHORT AXIS VIEW): 1 CM
INTERVENTRICULAR SEPTUM: 1 CM (ref 0.6–1.1)
LEFT ATRIUM SIZE: 3.2 CM
LEFT INTERNAL DIMENSION IN SYSTOLE: 4 CM (ref 2.1–4)
LEFT VENTRICULAR INTERNAL DIMENSION IN DIASTOLE: 5 CM (ref 3.5–6)
LEFT VENTRICULAR POSTERIOR WALL IN END DIASTOLE: 1 CM
LEFT VENTRICULAR STROKE VOLUME: 47 ML
LVSV (TEICH): 47 ML
LYMPHOCYTES # BLD AUTO: 1.87 THOUSANDS/ÂΜL (ref 0.6–4.47)
LYMPHOCYTES NFR BLD AUTO: 19 % (ref 14–44)
MCH RBC QN AUTO: 28.7 PG (ref 26.8–34.3)
MCHC RBC AUTO-ENTMCNC: 33.3 G/DL (ref 31.4–37.4)
MCV RBC AUTO: 86 FL (ref 82–98)
MONOCYTES # BLD AUTO: 0.8 THOUSAND/ÂΜL (ref 0.17–1.22)
MONOCYTES NFR BLD AUTO: 8 % (ref 4–12)
MV E'TISSUE VEL-LAT: 15 CM/S
MV E'TISSUE VEL-SEP: 6 CM/S
MV PEAK A VEL: 0.49 M/S
MV PEAK E VEL: 41 CM/S
MV STENOSIS PRESSURE HALF TIME: 43 MS
MV VALVE AREA P 1/2 METHOD: 5.12
NEUTROPHILS # BLD AUTO: 7.15 THOUSANDS/ÂΜL (ref 1.85–7.62)
NEUTS SEG NFR BLD AUTO: 70 % (ref 43–75)
NRBC BLD AUTO-RTO: 0 /100 WBCS
PLATELET # BLD AUTO: 198 THOUSANDS/UL (ref 149–390)
PMV BLD AUTO: 11.5 FL (ref 8.9–12.7)
POTASSIUM SERPL-SCNC: 3.4 MMOL/L (ref 3.5–5.3)
RBC # BLD AUTO: 4.91 MILLION/UL (ref 3.88–5.62)
RIGHT ATRIUM AREA SYSTOLE A4C: 21.6 CM2
RIGHT VENTRICLE ID DIMENSION: 2.9 CM
SL CV PED ECHO LEFT VENTRICLE DIASTOLIC VOLUME (MOD BIPLANE) 2D: 117 ML
SL CV PED ECHO LEFT VENTRICLE SYSTOLIC VOLUME (MOD BIPLANE) 2D: 70 ML
SODIUM SERPL-SCNC: 136 MMOL/L (ref 135–147)
TRICUSPID ANNULAR PLANE SYSTOLIC EXCURSION: 3 CM
WBC # BLD AUTO: 10.08 THOUSAND/UL (ref 4.31–10.16)

## 2024-04-23 PROCEDURE — 93306 TTE W/DOPPLER COMPLETE: CPT | Performed by: INTERNAL MEDICINE

## 2024-04-23 PROCEDURE — 85025 COMPLETE CBC W/AUTO DIFF WBC: CPT | Performed by: FAMILY MEDICINE

## 2024-04-23 PROCEDURE — 99233 SBSQ HOSP IP/OBS HIGH 50: CPT | Performed by: INTERNAL MEDICINE

## 2024-04-23 PROCEDURE — 99232 SBSQ HOSP IP/OBS MODERATE 35: CPT | Performed by: FAMILY MEDICINE

## 2024-04-23 PROCEDURE — 93306 TTE W/DOPPLER COMPLETE: CPT

## 2024-04-23 PROCEDURE — 80048 BASIC METABOLIC PNL TOTAL CA: CPT | Performed by: FAMILY MEDICINE

## 2024-04-23 RX ORDER — FUROSEMIDE 40 MG/1
40 TABLET ORAL DAILY
Status: DISCONTINUED | OUTPATIENT
Start: 2024-04-24 | End: 2024-04-24

## 2024-04-23 RX ADMIN — FUROSEMIDE 20 MG: 20 TABLET ORAL at 08:36

## 2024-04-23 RX ADMIN — CEFAZOLIN SODIUM 2000 MG: 2 SOLUTION INTRAVENOUS at 03:55

## 2024-04-23 RX ADMIN — CEFAZOLIN SODIUM 2000 MG: 2 SOLUTION INTRAVENOUS at 16:49

## 2024-04-23 RX ADMIN — OXYCODONE HYDROCHLORIDE 10 MG: 10 TABLET ORAL at 08:36

## 2024-04-23 RX ADMIN — OXYCODONE HYDROCHLORIDE 10 MG: 10 TABLET ORAL at 20:29

## 2024-04-23 RX ADMIN — OXYCODONE HYDROCHLORIDE 10 MG: 10 TABLET ORAL at 15:55

## 2024-04-23 RX ADMIN — LISINOPRIL 20 MG: 20 TABLET ORAL at 08:36

## 2024-04-23 RX ADMIN — CEFAZOLIN SODIUM 2000 MG: 2 SOLUTION INTRAVENOUS at 20:21

## 2024-04-23 RX ADMIN — PERFLUTREN 2 ML/MIN: 6.52 INJECTION, SUSPENSION INTRAVENOUS at 09:50

## 2024-04-23 RX ADMIN — CEFAZOLIN SODIUM 2000 MG: 2 SOLUTION INTRAVENOUS at 11:11

## 2024-04-23 NOTE — ASSESSMENT & PLAN NOTE
Recurrent last time hospitalization prolonged course responded to high-dose Ancef will start Ancef 2 g IV every 6 hours  Venous duplex done negative for  Continue Lasix 20 mg daily-we will increase the dose to 40 mg.  Elevate leg  Infectious disease consultation  No history of MRSA  Pain control Tylenol and oxycodone  Considering patient's overall conditions, expect slow recovery.

## 2024-04-23 NOTE — PROGRESS NOTES
Progress Note - Infectious Disease   Nicolás Balderrama 44 y.o. male MRN: [id number]  Unit/Bed#: -01 Encounter: [id number]        REQUIRED DOCUMENTATION:     1. This service was provided via Telemedicine.  2. Provider located at [place].  3. TeleMed provider: Tiff Contreras MD.  4. Identify all parties in room with patient during tele consult:RN  5. After connecting through televideo, patient was identified by name and date of birth and assistant checked wristband.  Patient was then informed that this was a Telemedicine visit and that the exam was being conducted confidentially over secure lines. My office door was closed. No one else was in the room.  Patient acknowledged consent and understanding of privacy and security of the Telemedicine visit, and gave us permission to have the assistant stay in the room in order to assist with the history and to conduct the exam.  I informed the patient that I have reviewed their record in Epic and presented the opportunity for them to ask any questions regarding the visit today.  The patient agreed to participate.       Assessment/Recommendations     Sepsis, POA  Recurrent right leg cellulitis  Morbid obesity  Chronic venous edema  History of tinea pedis     - Patient presents with recurrent cellulitis, sepsis in the setting of morbid obesity, chronic edema .  This is patient's second hospitalization in a year for the symptoms and fifth reported episode in the past year.  Patient's job involves prolonged standing and he denies wearing compression stockings which is the likely predisposing factor for his recurrent infection  - Afebrile without leukocytosis, anticipate slow improvement due to extensive edema/poor antibiotic tissue absorption     Continue cefazolin 2 every 6  Follow-up blood cultures  Consider trial of IV diuretics, fu TTE  Check daily CBC, CMP while inpatient to monitor for any evolving antibiotic toxicity or treatment failure  Serial extremity exams,  Scheduled procedure with Patient at  [phone] (home)   Scheduled Via: Open Case for [facility], Case #[id number]  Procedure date: 4.11.22  Procedure time: nurse to assign  Rep Contacted?: No  Entered into MD's [place]? No  Insurance confirmed as [employer], will be the same at time of procedure?: Yes  Letter sent via [employer] No  Is this a STAAR PT? No    The following have been confirmed:  Latex Allergy No  Diabetic No  Sleep Apnea No  Diuretic/Water pill No  Defibrillator/Pacemaker No  MRSA hx No  Blood thinners: Coumadin (Warfarin) or Plavix No      Aspirin No      Phentermine (diet pill) No  Pre-Op testing required Yes, Patient informed Yes  Prep required? No; Briefly reviewed? n/a; Prep cost range discussed? n/a  If procedure is scheduled 7 days or less, patient was told to  prep letter?: n/a     recommend Ace wrap when patient can tolerate, continue lower extremity skin care and limb elevation  Discussed natural history of cellulitis and discussed with patient that he is at risk for recurrent cellulitis/bacteremia if underlying risk factors cannot be modified, stressed the importance of weight loss, avoiding prolonged sitting or standing, skin and nail care and importance of compression stockings to aid with venous edema.  If he continues to have recurrent cellulitis may consider offering him a trial of suppressive penicillin until underlying risk factors can be modified    Discussed above plan in detail with the primary service who is in agreement.    History       Subjective:  The patient has no complaints. Continues with leg pain , swelling and redness but improving, has been keeping his leg elevated  Denies fevers, chills, or sweats.  Denies nausea, vomiting, or diarrhea.    Antibiotics:  Cefazolin      Physical Exam     Temp:  [97.3 °F (36.3 °C)-97.5 °F (36.4 °C)] 97.5 °F (36.4 °C)  HR:  [84-90] 84  Resp:  [16-22] 16  BP: (117-136)/(87-92) 127/87  SpO2:  [96 %-98 %] 97 %  Temp (24hrs), Av.4 °F (36.3 °C), Min:97.3 °F (36.3 °C), Max:97.5 °F (36.4 °C)  Current: Temperature: 97.5 °F (36.4 °C)    Intake/Output Summary (Last 24 hours) at 2024 0821  Last data filed at 2024 1805  Gross per 24 hour   Intake 1360 ml   Output --   Net 1360 ml       Physical exam findings reported by bedside and primary medical team staff      General Appearance:  Appearing well, nontoxic, and in no distress, appears stated age   Lungs:   Clear to auscultation bilaterally, no audible wheezes, rhonchi and rales, respirations unlabored   Heart:  Regular rate and rhythm, S1, S2 normal, no murmur, rub or gallop   Abdomen:   Soft, non-tender, non-distended, positive bowel sounds, no masses, no organomegaly    No CVA tenderness   Extremities: Extremities normal, atraumatic, no cyanosis, clubbing , RLE > LLE edema 2+    Skin: Skin color, texture, turgor normal, circumferential intense erythema over right leg   Neurologic: Alert and oriented times 3,         Invasive Devices:   Peripheral IV 04/23/24 Left;Proximal;Ventral (anterior) Forearm (Active)   Site Assessment WDL 04/23/24 0610   Dressing Type Transparent 04/23/24 0610   Line Status Blood return noted;Flushed & Clamped 04/23/24 0610   Dressing Status Clean;Dry;Intact 04/23/24 0610   Dressing Change Due 04/27/24 04/23/24 0610   Reason Not Rotated Not due 04/23/24 0610       Labs, Imaging, & Other Studies     Lab Results:    I have personally reviewed pertinent labs.    Results from last 7 days   Lab Units 04/23/24  0609 04/22/24  0437 04/21/24  1215   WBC Thousand/uL 10.08 11.61* 16.73*   HEMOGLOBIN g/dL 14.1 13.1 14.6   PLATELETS Thousands/uL 198 169 206     Results from last 7 days   Lab Units 04/23/24  0609 04/22/24  0437 04/21/24  1215   POTASSIUM mmol/L 3.4* 3.7 4.1   CHLORIDE mmol/L 102 102 102   CO2 mmol/L 28 24 27   BUN mg/dL 10 13 17   CREATININE mg/dL 0.84 0.93 1.20   EGFR ml/min/1.73sq m 105 98 72   CALCIUM mg/dL 8.8 8.6 9.4   AST U/L  --  24 38   ALT U/L  --  28 39   ALK PHOS U/L  --  82 102     Results from last 7 days   Lab Units 04/21/24  1227 04/21/24  1215   BLOOD CULTURE  No Growth at 24 hrs. No Growth at 24 hrs.       Imaging Studies:   I have personally reviewed pertinent imaging study reports and images in PACS.      EKG, Pathology, and Other Studies:   I have personally reviewed pertinent reports.        Counseling/Coordination of care:       Total 50 minutes communication with the patient via telehealth.  Labs, medical tests and imaging studies were independently reviewed by me as noted above.

## 2024-04-23 NOTE — PROGRESS NOTES
[facility]  Progress Note  Name: Nicolás Balderrama I  MRN: [id number]  Unit/Bed#: -Loretta I Date of Admission: 4/21/2024   Date of Service: 4/23/2024 I Hospital Day: 2    Assessment/Plan   * Cellulitis of right lower extremity  Assessment & Plan  Recurrent last time hospitalization prolonged course responded to high-dose Ancef will start Ancef 2 g IV every 6 hours  Venous duplex done negative for  Continue Lasix 20 mg daily-we will increase the dose to 40 mg.  Elevate leg  Infectious disease consultation  No history of MRSA  Pain control Tylenol and oxycodone  Considering patient's overall conditions, expect slow recovery.    Morbid obesity (HCC)  Assessment & Plan  Counseled    Elevated bilirubin  Assessment & Plan  History of fatty liver. Elevated bilirubin which is chronic. Outpatient follow-up. Recommended dietary modification and weight loss.                VTE Pharmacologic Prophylaxis: VTE Score: 2 Low Risk (Score 0-2) - Encourage Ambulation.    Mobility:   Basic Mobility Inpatient Raw Score: 24  JH-HLM Goal: 8: Walk 250 feet or more  JH-HLM Achieved: 7: Walk 25 feet or more  JH-HLM Goal achieved. Continue to encourage appropriate mobility.    Patient Centered Rounds: I performed bedside rounds with nursing staff today.   Discussions with Specialists or Other Care Team Provider: Infectious disease    Education and Discussions with Family / Patient:  With patient.  And his wife on bedside      Current Length of Stay: 2 day(s)  Current Patient Status: Inpatient   Certification Statement: The patient will continue to require additional inpatient hospital stay due to monitorable conditions  Discharge Plan: Anticipate discharge in 48-72 hrs to home.    Code Status: Level 1 - Full Code    Subjective:   Seen and evaluated and currently resting comfortably.  Complaining of swelling of the leg, and mild tightness.  Denies any chest pain, short of breath.    Objective:     Vitals:   Temp  (24hrs), Av.4 °F (36.3 °C), Min:97.3 °F (36.3 °C), Max:97.5 °F (36.4 °C)    Temp:  [97.3 °F (36.3 °C)-97.5 °F (36.4 °C)] 97.5 °F (36.4 °C)  HR:  [84-99] 99  Resp:  [16-22] 16  BP: (117-136)/(87-92) 127/87  SpO2:  [96 %-98 %] 96 %  Body mass index is 56 kg/m².     Input and Output Summary (last 24 hours):     Intake/Output Summary (Last 24 hours) at 2024 1447  Last data filed at 2024 1805  Gross per 24 hour   Intake 600 ml   Output --   Net 600 ml       Physical Exam:   Physical Exam  Vitals and nursing note reviewed.   Constitutional:       Appearance: Normal appearance. He is obese.   HENT:      Mouth/Throat:      Mouth: Mucous membranes are moist.   Eyes:      General: No scleral icterus.     Extraocular Movements: Extraocular movements intact.      Conjunctiva/sclera: Conjunctivae normal.      Pupils: Pupils are equal, round, and reactive to light.   Cardiovascular:      Rate and Rhythm: Normal rate.      Pulses: Normal pulses.      Heart sounds: Normal heart sounds.      No friction rub. No gallop.   Pulmonary:      Effort: No respiratory distress.      Breath sounds: No stridor. No wheezing or rhonchi.   Musculoskeletal:         General: Swelling (right lower extremity) present.      Right lower leg: No edema.      Left lower leg: No edema.   Skin:     Findings: Erythema present.   Neurological:      Mental Status: He is alert and oriented to person, place, and time.      Cranial Nerves: No cranial nerve deficit.      Sensory: No sensory deficit.      Motor: No weakness.      Coordination: Coordination normal.         Additional Data:     Labs:  Results from last 7 days   Lab Units 24  0609 24  0437 24  1215   WBC Thousand/uL 10.08   < > 16.73*   HEMOGLOBIN g/dL 14.1   < > 14.6   HEMATOCRIT % 42.3   < > 43.7   PLATELETS Thousands/uL 198   < > 206   BANDS PCT %  --   --  13*   SEGS PCT % 70   < >  --    LYMPHO PCT % 19   < > 3*   MONO PCT % 8   < > 1*   EOS PCT % 2   < > 0    < >  = values in this interval not displayed.     Results from last 7 days   Lab Units 04/23/24  0609 04/22/24  0437   SODIUM mmol/L 136 133*   POTASSIUM mmol/L 3.4* 3.7   CHLORIDE mmol/L 102 102   CO2 mmol/L 28 24   BUN mg/dL 10 13   CREATININE mg/dL 0.84 0.93   ANION GAP mmol/L 6 7   CALCIUM mg/dL 8.8 8.6   ALBUMIN g/dL  --  3.3*   TOTAL BILIRUBIN mg/dL  --  1.66*   ALK PHOS U/L  --  82   ALT U/L  --  28   AST U/L  --  24   GLUCOSE RANDOM mg/dL 94 99     Results from last 7 days   Lab Units 04/21/24  1215   INR  1.20*             Results from last 7 days   Lab Units 04/21/24  1215   LACTIC ACID mmol/L 0.9   PROCALCITONIN ng/ml 12.44*       Lines/Drains:  Invasive Devices       Peripheral Intravenous Line  Duration             Peripheral IV 04/23/24 Left;Proximal;Ventral (anterior) Forearm <1 day                          Imaging: Reviewed radiology reports from this admission including: ECHO    Recent Cultures (last 7 days):   Results from last 7 days   Lab Units 04/21/24  1227 04/21/24  1215   BLOOD CULTURE  No Growth at 24 hrs. No Growth at 24 hrs.       Last 24 Hours Medication List:   Current Facility-Administered Medications   Medication Dose Route Frequency Provider Last Rate    acetaminophen  650 mg Oral Q6H PRN Katey Collins MD      cefazolin  2,000 mg Intravenous Q6H Katey Collins MD 2,000 mg (04/23/24 1111)    enoxaparin  60 mg Subcutaneous Q12H [facility] Katey Collins MD      [START ON 4/24/2024] furosemide  40 mg Oral Daily Ashu Adler MD      lisinopril  20 mg Oral Daily Katey Collins MD      morphine injection  4 mg Intravenous Q2H PRN Leonardo Rueter, DO      oxyCODONE  5 mg Oral Q4H PRN Leonardo Rueter, DO      Or    oxyCODONE  10 mg Oral Q4H PRN Leonardo Rueter, DO          Today, Patient Was Seen By: Ashu Adler MD    **Please Note: This note may have been constructed using a voice recognition system.**

## 2024-04-23 NOTE — PLAN OF CARE
Problem: SKIN/TISSUE INTEGRITY - ADULT  Goal: Skin Integrity remains intact(Skin Breakdown Prevention)  Description: Assess:  -Perform Lauri assessment every   -Clean and moisturize skin every   -Inspect skin when repositioning, toileting, and assisting with ADLS  -Assess under medical devices such as  every   -Assess extremities for adequate circulation and sensation     Bed Management:  -Have minimal linens on bed & keep smooth, unwrinkled  -Change linens as needed when moist or perspiring  -Avoid sitting or lying in one position for more than  hours while in bed  -Keep HOB at degrees     Toileting:  -Offer bedside commode  -Assess for incontinence every   -Use incontinent care products after each incontinent episode such as     Activity:  -Mobilize patient  times a day  -Encourage activity and walks on unit  -Encourage or provide ROM exercises   -Turn and reposition patient every  Hours  -Use appropriate equipment to lift or move patient in bed  -Instruct/ Assist with weight shifting every  when out of bed in chair  -Consider limitation of chair time  hour intervals    Skin Care:  -Avoid use of baby powder, tape, friction and shearing, hot water or constrictive clothing  -Relieve pressure over bony prominences using   -Do not massage red bony areas    Next Steps:  -Teach patient strategies to minimize risks such as    -Consider consults to  interdisciplinary teams such as   Outcome: Progressing     Problem: PAIN - ADULT  Goal: Verbalizes/displays adequate comfort level or baseline comfort level  Description: Interventions:  - Encourage patient to monitor pain and request assistance  - Assess pain using appropriate pain scale  - Administer analgesics based on type and severity of pain and evaluate response  - Implement non-pharmacological measures as appropriate and evaluate response  - Consider cultural and social influences on pain and pain management  - Notify physician/advanced practitioner if interventions  unsuccessful or patient reports new pain  Outcome: Progressing     Problem: INFECTION - ADULT  Goal: Absence or prevention of progression during hospitalization  Description: INTERVENTIONS:  - Assess and monitor for signs and symptoms of infection  - Monitor lab/diagnostic results  - Monitor all insertion sites, i.e. indwelling lines, tubes, and drains  - Monitor endotracheal if appropriate and nasal secretions for changes in amount and color  - [place] appropriate cooling/warming therapies per order  - Administer medications as ordered  - Instruct and encourage patient and family to use good hand hygiene technique  - Identify and instruct in appropriate isolation precautions for identified infection/condition  Outcome: Progressing     Problem: SAFETY ADULT  Goal: Patient will remain free of falls  Description: INTERVENTIONS:  - Educate patient/family on patient safety including physical limitations  - Instruct patient to call for assistance with activity   - Consult OT/PT to assist with strengthening/mobility   - Keep Call bell within reach  - Keep bed low and locked with side rails adjusted as appropriate  - Keep care items and personal belongings within reach  - Initiate and maintain comfort rounds  - Make Fall Risk Sign visible to staff  - Offer Toileting every  Hours, in advance of need  - Initiate/Maintain alarm  - Obtain necessary fall risk management equipment:   - Apply yellow socks and bracelet for high fall risk patients  - Consider moving patient to room near nurses station  Outcome: Progressing  Goal: Maintain or return to baseline ADL function  Description: INTERVENTIONS:  -  Assess patient's ability to carry out ADLs; assess patient's baseline for ADL function and identify physical deficits which impact ability to perform ADLs (bathing, care of mouth/teeth, toileting, grooming, dressing, etc.)  - Assess/evaluate cause of self-care deficits   - Assess range of motion  - Assess patient's mobility;  develop plan if impaired  - Assess patient's need for assistive devices and provide as appropriate  - Encourage maximum independence but intervene and supervise when necessary  - Involve family in performance of ADLs  - Assess for home care needs following discharge   - Consider OT consult to assist with ADL evaluation and planning for discharge  - Provide patient education as appropriate  Outcome: Progressing  Goal: Maintains/Returns to pre admission functional level  Description: INTERVENTIONS:  - Perform AM-PAC 6 Click Basic Mobility/ Daily Activity assessment daily.  - Set and communicate daily mobility goal to care team and patient/family/caregiver.   - Collaborate with rehabilitation services on mobility goals if consulted  - Perform Range of Motion 3 times a day.  - Reposition patient every 2 hours.  - Dangle patient 3 times a day  - Stand patient 3 times a day  - Ambulate patient 3 times a day  - Out of bed to chair 3 times a day   - Out of bed for meals 3 times a day  - Out of bed for toileting  - Record patient progress and toleration of activity level   Outcome: Progressing     Problem: DISCHARGE PLANNING  Goal: Discharge to home or other facility with appropriate resources  Description: INTERVENTIONS:  - Identify barriers to discharge w/patient and caregiver  - Arrange for needed discharge resources and transportation as appropriate  - Identify discharge learning needs (meds, wound care, etc.)  - Arrange for interpretive services to assist at discharge as needed  - Refer to Case Management Department for coordinating discharge planning if the patient needs post-hospital services based on physician/advanced practitioner order or complex needs related to functional status, cognitive ability, or social support system  Outcome: Progressing     Problem: Knowledge Deficit  Goal: Patient/family/caregiver demonstrates understanding of disease process, treatment plan, medications, and discharge  instructions  Description: Complete learning assessment and assess knowledge base.  Interventions:  - Provide teaching at level of understanding  - Provide teaching via preferred learning methods  Outcome: Progressing

## 2024-04-23 NOTE — PLAN OF CARE
Problem: SKIN/TISSUE INTEGRITY - ADULT  Goal: Skin Integrity remains intact(Skin Breakdown Prevention)  Description: Assess:  -Perform Lauri assessment every shift  -Clean and moisturize skin every shift  -Inspect skin when repositioning, toileting, and assisting with ADLS  -Assess under medical devices  -Assess extremities for adequate circulation and sensation     Bed Management:  -Have minimal linens on bed & keep smooth, unwrinkled  -Change linens as needed when moist or perspiring  -Avoid sitting or lying in one position for more than 2 hours while in bed  -Keep HOB at 30 degrees     Toileting:  -Offer bedside commode  -Assess for incontinence every shift  -Use incontinent care products after each incontinent episode     Activity:  -Mobilize patient 3 times a day  -Encourage activity and walks on unit  -Encourage or provide ROM exercises   -Turn and reposition patient every 2 Hours  -Use appropriate equipment to lift or move patient in bed  -Instruct/ Assist with weight shifting every 2 hours when out of bed in chair    Skin Care:  -Avoid use of baby powder, tape, friction and shearing, hot water or constrictive clothing  -Relieve pressure over bony prominences   -Do not massage red bony areas    Next Steps:  -Teach patient strategies to minimize risks   -Consider consults to  interdisciplinary teams   Outcome: Progressing     Problem: PAIN - ADULT  Goal: Verbalizes/displays adequate comfort level or baseline comfort level  Description: Interventions:  - Encourage patient to monitor pain and request assistance  - Assess pain using appropriate pain scale  - Administer analgesics based on type and severity of pain and evaluate response  - Implement non-pharmacological measures as appropriate and evaluate response  - Consider cultural and social influences on pain and pain management  - Notify physician/advanced practitioner if interventions unsuccessful or patient reports new pain  Outcome: Progressing      Problem: INFECTION - ADULT  Goal: Absence or prevention of progression during hospitalization  Description: INTERVENTIONS:  - Assess and monitor for signs and symptoms of infection  - Monitor lab/diagnostic results  - Monitor all insertion sites, i.e. indwelling lines, tubes, and drains  - Monitor endotracheal if appropriate and nasal secretions for changes in amount and color  - [place] appropriate cooling/warming therapies per order  - Administer medications as ordered  - Instruct and encourage patient and family to use good hand hygiene technique  - Identify and instruct in appropriate isolation precautions for identified infection/condition  Outcome: Progressing     Problem: SAFETY ADULT  Goal: Patient will remain free of falls  Description: INTERVENTIONS:  - Educate patient/family on patient safety including physical limitations  - Instruct patient to call for assistance with activity   - Consult OT/PT to assist with strengthening/mobility   - Keep Call bell within reach  - Keep bed low and locked with side rails adjusted as appropriate  - Keep care items and personal belongings within reach  - Initiate and maintain comfort rounds  - Make Fall Risk Sign visible to staff  - Offer Toileting every 2 Hours, in advance of need  - Initiate/Maintain bed/chair alarm  - Obtain necessary fall risk management equipment  - Apply yellow socks and bracelet for high fall risk patients  - Consider moving patient to room near nurses station  Outcome: Progressing  Goal: Maintain or return to baseline ADL function  Description: INTERVENTIONS:  -  Assess patient's ability to carry out ADLs; assess patient's baseline for ADL function and identify physical deficits which impact ability to perform ADLs (bathing, care of mouth/teeth, toileting, grooming, dressing, etc.)  - Assess/evaluate cause of self-care deficits   - Assess range of motion  - Assess patient's mobility; develop plan if impaired  - Assess patient's need for  assistive devices and provide as appropriate  - Encourage maximum independence but intervene and supervise when necessary  - Involve family in performance of ADLs  - Assess for home care needs following discharge   - Consider OT consult to assist with ADL evaluation and planning for discharge  - Provide patient education as appropriate  Outcome: Progressing  Goal: Maintains/Returns to pre admission functional level  Description: INTERVENTIONS:  - Perform AM-PAC 6 Click Basic Mobility/ Daily Activity assessment daily.  - Set and communicate daily mobility goal to care team and patient/family/caregiver.   - Collaborate with rehabilitation services on mobility goals if consulted  - Reposition patient every 2 hours.  - Stand patient 3 times a day  - Ambulate patient 3 times a day  - Out of bed to chair 3 times a day   - Out of bed for meals 3 times a day  - Out of bed for toileting  - Record patient progress and toleration of activity level   Outcome: Progressing     Problem: DISCHARGE PLANNING  Goal: Discharge to home or other facility with appropriate resources  Description: INTERVENTIONS:  - Identify barriers to discharge w/patient and caregiver  - Arrange for needed discharge resources and transportation as appropriate  - Identify discharge learning needs (meds, wound care, etc.)  - Arrange for interpretive services to assist at discharge as needed  - Refer to Case Management Department for coordinating discharge planning if the patient needs post-hospital services based on physician/advanced practitioner order or complex needs related to functional status, cognitive ability, or social support system  Outcome: Progressing     Problem: Knowledge Deficit  Goal: Patient/family/caregiver demonstrates understanding of disease process, treatment plan, medications, and discharge instructions  Description: Complete learning assessment and assess knowledge base.  Interventions:  - Provide teaching at level of  understanding  - Provide teaching via preferred learning methods  Outcome: Progressing

## 2024-04-24 PROBLEM — R93.1 ABNORMAL ECHOCARDIOGRAM: Status: ACTIVE | Noted: 2024-04-24

## 2024-04-24 LAB
ALBUMIN SERPL BCP-MCNC: 3.1 G/DL (ref 3.5–5)
ALP SERPL-CCNC: 87 U/L (ref 34–104)
ALT SERPL W P-5'-P-CCNC: 15 U/L (ref 7–52)
ANION GAP SERPL CALCULATED.3IONS-SCNC: 7 MMOL/L (ref 4–13)
AST SERPL W P-5'-P-CCNC: 20 U/L (ref 13–39)
BASOPHILS # BLD AUTO: 0.02 THOUSANDS/ÂΜL (ref 0–0.1)
BASOPHILS NFR BLD AUTO: 0 % (ref 0–1)
BILIRUB SERPL-MCNC: 0.55 MG/DL (ref 0.2–1)
BUN SERPL-MCNC: 11 MG/DL (ref 5–25)
CALCIUM ALBUM COR SERPL-MCNC: 9.1 MG/DL (ref 8.3–10.1)
CALCIUM SERPL-MCNC: 8.4 MG/DL (ref 8.4–10.2)
CHLORIDE SERPL-SCNC: 105 MMOL/L (ref 96–108)
CO2 SERPL-SCNC: 27 MMOL/L (ref 21–32)
CREAT SERPL-MCNC: 0.74 MG/DL (ref 0.6–1.3)
EOSINOPHIL # BLD AUTO: 0.32 THOUSAND/ÂΜL (ref 0–0.61)
EOSINOPHIL NFR BLD AUTO: 4 % (ref 0–6)
ERYTHROCYTE [DISTWIDTH] IN BLOOD BY AUTOMATED COUNT: 13.1 % (ref 11.6–15.1)
GFR SERPL CREATININE-BSD FRML MDRD: 111 ML/MIN/1.73SQ M
GLUCOSE SERPL-MCNC: 103 MG/DL (ref 65–140)
HCT VFR BLD AUTO: 41 % (ref 36.5–49.3)
HGB BLD-MCNC: 13.4 G/DL (ref 12–17)
IMM GRANULOCYTES # BLD AUTO: 0.06 THOUSAND/UL (ref 0–0.2)
IMM GRANULOCYTES NFR BLD AUTO: 1 % (ref 0–2)
LYMPHOCYTES # BLD AUTO: 1.92 THOUSANDS/ÂΜL (ref 0.6–4.47)
LYMPHOCYTES NFR BLD AUTO: 22 % (ref 14–44)
MCH RBC QN AUTO: 28.2 PG (ref 26.8–34.3)
MCHC RBC AUTO-ENTMCNC: 32.7 G/DL (ref 31.4–37.4)
MCV RBC AUTO: 86 FL (ref 82–98)
MONOCYTES # BLD AUTO: 0.87 THOUSAND/ÂΜL (ref 0.17–1.22)
MONOCYTES NFR BLD AUTO: 10 % (ref 4–12)
NEUTROPHILS # BLD AUTO: 5.64 THOUSANDS/ÂΜL (ref 1.85–7.62)
NEUTS SEG NFR BLD AUTO: 63 % (ref 43–75)
NRBC BLD AUTO-RTO: 0 /100 WBCS
PLATELET # BLD AUTO: 214 THOUSANDS/UL (ref 149–390)
PMV BLD AUTO: 10.8 FL (ref 8.9–12.7)
POTASSIUM SERPL-SCNC: 3.6 MMOL/L (ref 3.5–5.3)
PROT SERPL-MCNC: 6.6 G/DL (ref 6.4–8.4)
RBC # BLD AUTO: 4.75 MILLION/UL (ref 3.88–5.62)
SODIUM SERPL-SCNC: 139 MMOL/L (ref 135–147)
WBC # BLD AUTO: 8.83 THOUSAND/UL (ref 4.31–10.16)

## 2024-04-24 PROCEDURE — 99255 IP/OBS CONSLTJ NEW/EST HI 80: CPT | Performed by: INTERNAL MEDICINE

## 2024-04-24 PROCEDURE — 99232 SBSQ HOSP IP/OBS MODERATE 35: CPT | Performed by: FAMILY MEDICINE

## 2024-04-24 PROCEDURE — 85025 COMPLETE CBC W/AUTO DIFF WBC: CPT | Performed by: FAMILY MEDICINE

## 2024-04-24 PROCEDURE — 80053 COMPREHEN METABOLIC PANEL: CPT | Performed by: FAMILY MEDICINE

## 2024-04-24 RX ORDER — FUROSEMIDE 10 MG/ML
40 INJECTION INTRAMUSCULAR; INTRAVENOUS DAILY
Status: COMPLETED | OUTPATIENT
Start: 2024-04-25 | End: 2024-04-25

## 2024-04-24 RX ORDER — FUROSEMIDE 10 MG/ML
20 INJECTION INTRAMUSCULAR; INTRAVENOUS ONCE
Status: COMPLETED | OUTPATIENT
Start: 2024-04-24 | End: 2024-04-24

## 2024-04-24 RX ADMIN — FUROSEMIDE 20 MG: 10 INJECTION, SOLUTION INTRAMUSCULAR; INTRAVENOUS at 13:57

## 2024-04-24 RX ADMIN — CEFAZOLIN SODIUM 2000 MG: 2 SOLUTION INTRAVENOUS at 08:37

## 2024-04-24 RX ADMIN — CEFAZOLIN SODIUM 2000 MG: 2 SOLUTION INTRAVENOUS at 20:45

## 2024-04-24 RX ADMIN — CEFAZOLIN SODIUM 2000 MG: 2 SOLUTION INTRAVENOUS at 04:06

## 2024-04-24 RX ADMIN — ACETAMINOPHEN 325MG 650 MG: 325 TABLET ORAL at 19:42

## 2024-04-24 RX ADMIN — ACETAMINOPHEN 325MG 650 MG: 325 TABLET ORAL at 10:27

## 2024-04-24 RX ADMIN — CEFAZOLIN SODIUM 2000 MG: 2 SOLUTION INTRAVENOUS at 15:02

## 2024-04-24 RX ADMIN — FUROSEMIDE 40 MG: 40 TABLET ORAL at 08:37

## 2024-04-24 RX ADMIN — ACETAMINOPHEN 325MG 650 MG: 325 TABLET ORAL at 04:14

## 2024-04-24 RX ADMIN — LISINOPRIL 20 MG: 20 TABLET ORAL at 08:37

## 2024-04-24 NOTE — PROGRESS NOTES
[facility]  Progress Note  Name: Nicolás Balderrama I  MRN: [id number]  Unit/Bed#: -01 I Date of Admission: 4/21/2024   Date of Service: 4/24/2024 I Hospital Day: 3    Assessment/Plan   * Cellulitis of right lower extremity  Assessment & Plan  Recurrent last time hospitalization prolonged course responded to high-dose Ancef will start Ancef 2 g IV every 6 hours  Venous duplex done negative for  Continue Lasix 20 mg daily-we will increase the dose to 40 mg.  Elevate leg  Infectious disease consultation  No history of MRSA  Pain control Tylenol and oxycodone  Considering patient's overall conditions, expect slow recovery.  Continue current treatment since patient slowly improving    Abnormal echocardiogram  Assessment & Plan  Suboptimal study due to poor acoustic window.  Echo contrast was used.  Left ventricular is normal in size and function.  Right heart chambers appeared moderately enlarged (cor-pulomale ?)    On IV Lasix and monitor.  Will need outpatient follow-up with cardiology, may benefit with outpatient sleep study to rule out sleep apnea.    Morbid obesity (HCC)  Assessment & Plan  Counseled    Elevated bilirubin  Assessment & Plan  History of fatty liver. Elevated bilirubin which is chronic. Outpatient follow-up. Recommended dietary modification and weight loss.              VTE Pharmacologic Prophylaxis: VTE Score: 2 Low Risk (Score 0-2) - Encourage Ambulation.    Mobility:   Basic Mobility Inpatient Raw Score: 24  JH-HLM Goal: 8: Walk 250 feet or more  JH-HLM Achieved: 7: Walk 25 feet or more  JH-HLM Goal NOT achieved. Continue with multidisciplinary rounding and encourage appropriate mobility to improve upon JH-HLM goals.    Patient Centered Rounds: I performed bedside rounds with nursing staff today.   Discussions with Specialists or Other Care Team Provider: Infectious disease    Education and Discussions with Family / Patient: Updated  (wife) at  bedside.      Current Length of Stay: 3 day(s)  Current Patient Status: Inpatient   Certification Statement: The patient will continue to require additional inpatient hospital stay due to monitor above conditions  Discharge Plan: Anticipate discharge in 48-72 hrs to home.    Code Status: Level 1 - Full Code    Subjective:   Seen and evaluated and examined.  Resting comfortably.  Still right leg is swollen but per patient, pain is much improved and swelling is going down.    Objective:     Vitals:   Temp (24hrs), Av.2 °F (36.2 °C), Min:97.2 °F (36.2 °C), Max:97.2 °F (36.2 °C)    Temp:  [97.2 °F (36.2 °C)] 97.2 °F (36.2 °C)  HR:  [75-90] 87  Resp:  [16] 16  BP: (123-158)/(72-97) 133/82  SpO2:  [96 %-97 %] 96 %  Body mass index is 56 kg/m².     Input and Output Summary (last 24 hours):     Intake/Output Summary (Last 24 hours) at 2024 1728  Last data filed at 2024 0837  Gross per 24 hour   Intake 50 ml   Output --   Net 50 ml       Physical Exam:   Physical Exam  Vitals and nursing note reviewed. Exam conducted with a chaperone present.   Constitutional:       Appearance: He is obese.   Musculoskeletal:         General: Swelling (right lower extremity) and tenderness (right lower extremity) present.      Right lower leg: No edema.      Left lower leg: No edema.   Skin:     Findings: Erythema (right lower extremity) present. No bruising or lesion.   Neurological:      Mental Status: He is oriented to person, place, and time.        Additional Data:     Labs:  Results from last 7 days   Lab Units 24  0548 24  0437 24  1215   WBC Thousand/uL 8.83   < > 16.73*   HEMOGLOBIN g/dL 13.4   < > 14.6   HEMATOCRIT % 41.0   < > 43.7   PLATELETS Thousands/uL 214   < > 206   BANDS PCT %  --   --  13*   SEGS PCT % 63   < >  --    LYMPHO PCT % 22   < > 3*   MONO PCT % 10   < > 1*   EOS PCT % 4   < > 0    < > = values in this interval not displayed.     Results from last 7 days   Lab Units 24  0548    SODIUM mmol/L 139   POTASSIUM mmol/L 3.6   CHLORIDE mmol/L 105   CO2 mmol/L 27   BUN mg/dL 11   CREATININE mg/dL 0.74   ANION GAP mmol/L 7   CALCIUM mg/dL 8.4   ALBUMIN g/dL 3.1*   TOTAL BILIRUBIN mg/dL 0.55   ALK PHOS U/L 87   ALT U/L 15   AST U/L 20   GLUCOSE RANDOM mg/dL 103     Results from last 7 days   Lab Units 04/21/24  1215   INR  1.20*             Results from last 7 days   Lab Units 04/21/24  1215   LACTIC ACID mmol/L 0.9   PROCALCITONIN ng/ml 12.44*       Lines/Drains:  Invasive Devices       Peripheral Intravenous Line  Duration             Peripheral IV 04/24/24 Left;Ventral (anterior) Forearm <1 day                          Imaging: No pertinent imaging reviewed.    Recent Cultures (last 7 days):   Results from last 7 days   Lab Units 04/21/24  1227 04/21/24  1215   BLOOD CULTURE  No Growth at 48 hrs. No Growth at 48 hrs.       Last 24 Hours Medication List:   Current Facility-Administered Medications   Medication Dose Route Frequency Provider Last Rate    acetaminophen  650 mg Oral Q6H PRN Katey Collins MD      cefazolin  2,000 mg Intravenous Q6H Katey Collins MD 2,000 mg (04/24/24 1502)    enoxaparin  60 mg Subcutaneous Q12H [facility] Katey Collins MD      [START ON 4/25/2024] furosemide  40 mg Intravenous Daily Ashu Adler MD      lisinopril  20 mg Oral Daily Katey Collins MD      morphine injection  4 mg Intravenous Q2H PRN Leonardo Hernándezeter, DO      oxyCODONE  5 mg Oral Q4H PRN Leonardo Rueter, DO      Or    oxyCODONE  10 mg Oral Q4H PRN Leonardo Rueter, DO          Today, Patient Was Seen By: Ashu Adler MD    **Please Note: This note may have been constructed using a voice recognition system.**

## 2024-04-24 NOTE — ASSESSMENT & PLAN NOTE
Recurrent last time hospitalization prolonged course responded to high-dose Ancef will start Ancef 2 g IV every 6 hours  Venous duplex done negative for  Continue Lasix 20 mg daily-we will increase the dose to 40 mg.  Elevate leg  Infectious disease consultation  No history of MRSA  Pain control Tylenol and oxycodone  Considering patient's overall conditions, expect slow recovery.  Continue current treatment since patient slowly improving

## 2024-04-24 NOTE — ASSESSMENT & PLAN NOTE
Suboptimal study due to poor acoustic window.  Echo contrast was used.  Left ventricular is normal in size and function.  Right heart chambers appeared moderately enlarged (cor-pulomale ?)    On IV Lasix and monitor.  Will need outpatient follow-up with cardiology, may benefit with outpatient sleep study to rule out sleep apnea.

## 2024-04-24 NOTE — PROGRESS NOTES
Progress Note - Infectious Disease   Nicolás Balderrama 44 y.o. male MRN: [id number]  Unit/Bed#: -01 Encounter: [id number]        REQUIRED DOCUMENTATION:     1. This service was provided via Telemedicine.  2. Provider located at [place].  3. TeleMed provider: Tiff Contreras MD.  4. Identify all parties in room with patient during tele consult:RN  5. After connecting through televideo, patient was identified by name and date of birth and assistant checked wristband.  Patient was then informed that this was a Telemedicine visit and that the exam was being conducted confidentially over secure lines. My office door was closed. No one else was in the room.  Patient acknowledged consent and understanding of privacy and security of the Telemedicine visit, and gave us permission to have the assistant stay in the room in order to assist with the history and to conduct the exam.  I informed the patient that I have reviewed their record in Epic and presented the opportunity for them to ask any questions regarding the visit today.  The patient agreed to participate.       Assessment/Recommendations     Sepsis, POA  Recurrent right leg cellulitis  Possible sleep apnea, cor pulmonale  Morbid obesity  Chronic venous edema  History of tinea pedis     - Patient presents with recurrent cellulitis, sepsis in the setting of morbid obesity, chronic edema .  This is patient's second hospitalization in a year for the symptoms and fifth reported episode in the past year.  Patient's job involves prolonged standing and he denies wearing compression stockings which is the likely predisposing factor for his recurrent infection. Also concerned for sleep apnea and diastolic dysfunction as suspected on TTE.  - Afebrile without leukocytosis, anticipate slow improvement due to extensive edema/poor antibiotic tissue absorption     Continue cefazolin 2 every 6  Follow-up blood cultures  Recommend IV diuretics and consider cardiology consult,  recommend sleep study as outpatient  Recommend wound care consult and ace wraps, continue limb elevation  Check daily CBC, CMP while inpatient to monitor for any evolving antibiotic toxicity or treatment failure  Serial extremity exams  Discussed natural history of cellulitis and discussed with patient that he is at risk for recurrent cellulitis/bacteremia if underlying risk factors cannot be modified, stressed the importance of weight loss, avoiding prolonged sitting or standing, skin and nail care and importance of compression stockings to aid with venous edema.  If he continues to have recurrent cellulitis may consider offering him a trial of suppressive penicillin until underlying risk factors can be modified    Discussed above plan in detail with the primary service who is in agreement.    History       Subjective:  The patient has no complaints. Continues with leg pain , swelling and redness which is improving but slowly, has been keeping his leg elevated  Denies fevers, chills, or sweats.  Denies nausea, vomiting, or diarrhea.    Antibiotics:  Cefazolin      Physical Exam     Temp:  [97.3 °F (36.3 °C)] 97.3 °F (36.3 °C)  HR:  [75-99] 75  Resp:  [16-18] 16  BP: (123-137)/(72-87) 137/80  SpO2:  [95 %-97 %] 97 %  Temp (24hrs), Av.3 °F (36.3 °C), Min:97.3 °F (36.3 °C), Max:97.3 °F (36.3 °C)  Current: Temperature: (!) 97.3 °F (36.3 °C)  No intake or output data in the 24 hours ending 24 0824      Physical exam findings reported by bedside and primary medical team staff      General Appearance:  Appearing well, nontoxic, and in no distress, appears stated age   Lungs:   Clear to auscultation bilaterally, no audible wheezes, rhonchi and rales, respirations unlabored   Heart:  Regular rate and rhythm, S1, S2 normal, no murmur, rub or gallop   Abdomen:   Soft, non-tender, non-distended, positive bowel sounds, no masses, no organomegaly    No CVA tenderness   Extremities: Extremities normal, atraumatic, no  cyanosis, clubbing , RLE > LLE edema 2+   Skin: Skin color, texture, turgor normal, fading but extensive circumferential erythema over right leg   Neurologic: Alert and oriented times 3,         Invasive Devices:   Peripheral IV 04/23/24 Left;Proximal;Ventral (anterior) Forearm (Active)   Site Assessment WDL 04/23/24 0610   Dressing Type Transparent 04/23/24 0610   Line Status Blood return noted;Flushed & Clamped 04/23/24 0610   Dressing Status Clean;Dry;Intact 04/23/24 0610   Dressing Change Due 04/27/24 04/23/24 0610   Reason Not Rotated Not due 04/23/24 0610       Labs, Imaging, & Other Studies     Lab Results:    I have personally reviewed pertinent labs.    Results from last 7 days   Lab Units 04/24/24  0548 04/23/24  0609 04/22/24  0437   WBC Thousand/uL 8.83 10.08 11.61*   HEMOGLOBIN g/dL 13.4 14.1 13.1   PLATELETS Thousands/uL 214 198 169     Results from last 7 days   Lab Units 04/24/24  0548 04/23/24  0609 04/22/24  0437 04/21/24  1215   POTASSIUM mmol/L 3.6   < > 3.7 4.1   CHLORIDE mmol/L 105   < > 102 102   CO2 mmol/L 27   < > 24 27   BUN mg/dL 11   < > 13 17   CREATININE mg/dL 0.74   < > 0.93 1.20   EGFR ml/min/1.73sq m 111   < > 98 72   CALCIUM mg/dL 8.4   < > 8.6 9.4   AST U/L 20  --  24 38   ALT U/L 15  --  28 39   ALK PHOS U/L 87  --  82 102    < > = values in this interval not displayed.     Results from last 7 days   Lab Units 04/21/24  1227 04/21/24  1215   BLOOD CULTURE  No Growth at 48 hrs. No Growth at 48 hrs.       Imaging Studies:   I have personally reviewed pertinent imaging study reports and images in PACS.      EKG, Pathology, and Other Studies:   I have personally reviewed pertinent reports.        Counseling/Coordination of care:       Total 50 minutes communication with the patient via telehealth.  Labs, medical tests and imaging studies were independently reviewed by me as noted above.

## 2024-04-24 NOTE — PLAN OF CARE
Problem: SKIN/TISSUE INTEGRITY - ADULT  Goal: Skin Integrity remains intact(Skin Breakdown Prevention)  Description: Assess:  -Perform Lauri assessment every shift  -Clean and moisturize skin every shift  -Inspect skin when repositioning, toileting, and assisting with ADLS    -Assess extremities for adequate circulation and sensation     Bed Management:  -Have minimal linens on bed & keep smooth, unwrinkled  -Change linens as needed when moist or perspiring  -Avoid sitting or lying in one position for more than 2 hours while in bed  -Keep HOB at 30degrees     Toileting:  -Offer bedside commode    Activity:  -Mobilize patient 3 times a day  -Encourage activity and walks on unit  -Encourage or provide ROM exercises   -Turn and reposition patient every 2 Hours  -Use appropriate equipment to lift or move patient in bed  -Instruct/ Assist with weight shifting every time when out of bed in chair  -Consider limitation of chair time 2 hour intervals    Skin Care:  -Avoid use of baby powder, tape, friction and shearing, hot water or constrictive clothing    -Do not massage red bony areas    Next Steps:     -Consider consults to  interdisciplinary teams such as PT  Outcome: Progressing     Problem: PAIN - ADULT  Goal: Verbalizes/displays adequate comfort level or baseline comfort level  Description: Interventions:  - Encourage patient to monitor pain and request assistance  - Assess pain using appropriate pain scale  - Administer analgesics based on type and severity of pain and evaluate response  - Implement non-pharmacological measures as appropriate and evaluate response  - Consider cultural and social influences on pain and pain management  - Notify physician/advanced practitioner if interventions unsuccessful or patient reports new pain  Outcome: Progressing     Problem: INFECTION - ADULT  Goal: Absence or prevention of progression during hospitalization  Description: INTERVENTIONS:  - Assess and monitor for signs and  symptoms of infection  - Monitor lab/diagnostic results  - Monitor all insertion sites, i.e. indwelling lines, tubes, and drains  - Monitor endotracheal if appropriate and nasal secretions for changes in amount and color  - [place] appropriate cooling/warming therapies per order  - Administer medications as ordered  - Instruct and encourage patient and family to use good hand hygiene technique  - Identify and instruct in appropriate isolation precautions for identified infection/condition  Outcome: Progressing     Problem: SAFETY ADULT  Goal: Patient will remain free of falls  Description: INTERVENTIONS:  - Educate patient/family on patient safety including physical limitations  - Instruct patient to call for assistance with activity   - Consult OT/PT to assist with strengthening/mobility   - Keep Call bell within reach  - Keep bed low and locked with side rails adjusted as appropriate  - Keep care items and personal belongings within reach  - Initiate and maintain comfort rounds  - Make Fall Risk Sign visible to staff  - Offer Toileting every 2 Hours, in advance of need  - Apply yellow socks and bracelet for high fall risk patients  - Consider moving patient to room near nurses station  Outcome: Progressing  Goal: Maintain or return to baseline ADL function  Description: INTERVENTIONS:  -  Assess patient's ability to carry out ADLs; assess patient's baseline for ADL function and identify physical deficits which impact ability to perform ADLs (bathing, care of mouth/teeth, toileting, grooming, dressing, etc.)  - Assess/evaluate cause of self-care deficits   - Assess range of motion  - Assess patient's mobility; develop plan if impaired  - Assess patient's need for assistive devices and provide as appropriate  - Encourage maximum independence but intervene and supervise when necessary  - Involve family in performance of ADLs  - Assess for home care needs following discharge   - Consider OT consult to assist with  ADL evaluation and planning for discharge  - Provide patient education as appropriate  Outcome: Progressing  Goal: Maintains/Returns to pre admission functional level  Description: INTERVENTIONS:  - Perform AM-PAC 6 Click Basic Mobility/ Daily Activity assessment daily.  - Set and communicate daily mobility goal to care team and patient/family/caregiver.   - Collaborate with rehabilitation services on mobility goals if consulted  - Ambulate patient 3 times a day  - Out of bed to chair 3 times a day   - Out of bed for meals 3 times a day  - Out of bed for toileting  - Record patient progress and toleration of activity level   Outcome: Progressing     Problem: DISCHARGE PLANNING  Goal: Discharge to home or other facility with appropriate resources  Description: INTERVENTIONS:  - Identify barriers to discharge w/patient and caregiver  - Arrange for needed discharge resources and transportation as appropriate  - Identify discharge learning needs (meds, wound care, etc.)  - Arrange for interpretive services to assist at discharge as needed  - Refer to Case Management Department for coordinating discharge planning if the patient needs post-hospital services based on physician/advanced practitioner order or complex needs related to functional status, cognitive ability, or social support system  Outcome: Progressing     Problem: Knowledge Deficit  Goal: Patient/family/caregiver demonstrates understanding of disease process, treatment plan, medications, and discharge instructions  Description: Complete learning assessment and assess knowledge base.  Interventions:  - Provide teaching at level of understanding  - Provide teaching via preferred learning methods  Outcome: Progressing

## 2024-04-24 NOTE — UTILIZATION REVIEW
Continued Stay Review    Date: 4/24/24                             Current Patient Class: IP  Current Level of Care: MS    HPI:44 y.o. male initially admitted on 4/21/24 - DX:  Sepsis / cellulitis / sleep apnea    Assessment/Plan:   4/24/24: Continues with leg pain , swelling and redness which is improving but slowly, has been keeping his leg elevated; pain 6-8/10  concerned for sleep apnea and diastolic dysfunction as suspected on TTE. Recommend IV diuretics and consider cardiology consult, recommend sleep study as outpatient  Plan: cont iv abx; cont iv lasix; monitor labs; pain control (see below); wound care consult and ace wraps, continue limb elevation     Vital Signs:   Date/Time Temp Pulse Resp BP MAP (mmHg) SpO2 O2 Device   04/24/24 15:12:19 97.2 °F (36.2 °C) Abnormal  87 16 133/82 99 96 % --   04/24/24 13:57:31 -- 86 -- 158/97 117 96 % --   04/24/24 0830 -- -- -- -- -- 97 % None (Room air)   04/24/24 07:08:04 -- 75 16 137/80 99 97 %        Pertinent Labs/Diagnostic Results:     Results from last 7 days   Lab Units 04/24/24  0548 04/23/24  0609 04/22/24  0437 04/21/24  1215   WBC Thousand/uL 8.83 10.08 11.61* 16.73*   HEMOGLOBIN g/dL 13.4 14.1 13.1 14.6   HEMATOCRIT % 41.0 42.3 38.5 43.7   PLATELETS Thousands/uL 214 198 169 206   TOTAL NEUT ABS Thousands/µL 5.64 7.15 9.58*  --    BANDS PCT %  --   --   --  13*       Results from last 7 days   Lab Units 04/24/24  0548 04/23/24  0609 04/22/24  0437 04/21/24  1215   SODIUM mmol/L 139 136 133* 135   POTASSIUM mmol/L 3.6 3.4* 3.7 4.1   CHLORIDE mmol/L 105 102 102 102   CO2 mmol/L 27 28 24 27   ANION GAP mmol/L 7 6 7 6   BUN mg/dL 11 10 13 17   CREATININE mg/dL 0.74 0.84 0.93 1.20   EGFR ml/min/1.73sq m 111 105 98 72   CALCIUM mg/dL 8.4 8.8 8.6 9.4     Results from last 7 days   Lab Units 04/24/24  0548 04/22/24  0437 04/21/24  1215   AST U/L 20 24 38   ALT U/L 15 28 39   ALK PHOS U/L 87 82 102   TOTAL PROTEIN g/dL 6.6 6.6 7.9   ALBUMIN g/dL 3.1* 3.3* 3.9   TOTAL  BILIRUBIN mg/dL 0.55 1.66* 2.34*       Results from last 7 days   Lab Units 04/24/24  0548 04/23/24  0609 04/22/24  0437 04/21/24  1215   GLUCOSE RANDOM mg/dL 103 94 99 93       Results from last 7 days   Lab Units 04/21/24  1215   PROTIME seconds 15.5*   INR  1.20*   PTT seconds 41*       Results from last 7 days   Lab Units 04/21/24  1215   PROCALCITONIN ng/ml 12.44*     Results from last 7 days   Lab Units 04/21/24  1215   LACTIC ACID mmol/L 0.9       Results from last 7 days   Lab Units 04/21/24  1227 04/21/24  1215   BLOOD CULTURE  No Growth at 48 hrs. No Growth at 48 hrs.          Medications:   Scheduled Medications:  cefazolin, 2,000 mg, Intravenous, Q6H  enoxaparin, 60 mg, Subcutaneous, Q12H TRINI  [START ON 4/25/2024] furosemide, 40 mg, Intravenous, Daily  lisinopril, 20 mg, Oral, Daily    furosemide (LASIX) injection 20 mg  Dose: 20 mg  Freq: Once Route: IV  Start: 04/24/24 1345 End: 04/24/24 1357         Continuous IV Infusions: None       PRN Meds:  acetaminophen, 650 mg, Oral, Q6H PRN  (4/24 rec'd x2 so far today)   morphine injection, 4 mg, Intravenous, Q2H PRN  oxyCODONE, 5 mg, Oral, Q4H PRN   Or  oxyCODONE, 10 mg, Oral, Q4H PRN        Discharge Plan: D    Network Utilization Review Department  ATTENTION: Please call with any questions or concerns to [phone] and carefully listen to the prompts so that you are directed to the right person. All voicemails are confidential.   For Discharge needs, contact Care Management DC Support Team at [phone] opt. 2  Send all requests for admission clinical reviews, approved or denied determinations and any other requests to dedicated fax number below belonging to the campus where the patient is receiving treatment. List of dedicated fax numbers for the Facilities:  FACILITY NAME UR FAX NUMBER   ADMISSION DENIALS (Administrative/Medical Necessity) [phone]   DISCHARGE SUPPORT TEAM (NETWORK) [phone]   PARENT CHILD HEALTH (Maternity/NICU/Pediatrics)  [phone]   [facility] [phone]   [facility] [phone]   [facility] [phone]   [facility] [phone]   [facility] [phone]   [facility] [phone]   [facility] [phone]   [facility] [phone]   [facility] [phone]   [facility] [phone]   [facility] [phone]   [facility] [phone]

## 2024-04-25 LAB
ALBUMIN SERPL BCP-MCNC: 3.3 G/DL (ref 3.5–5)
ALP SERPL-CCNC: 93 U/L (ref 34–104)
ALT SERPL W P-5'-P-CCNC: 14 U/L (ref 7–52)
ANION GAP SERPL CALCULATED.3IONS-SCNC: 6 MMOL/L (ref 4–13)
AST SERPL W P-5'-P-CCNC: 19 U/L (ref 13–39)
BASOPHILS # BLD AUTO: 0.05 THOUSANDS/ÂΜL (ref 0–0.1)
BASOPHILS NFR BLD AUTO: 1 % (ref 0–1)
BILIRUB SERPL-MCNC: 0.73 MG/DL (ref 0.2–1)
BUN SERPL-MCNC: 10 MG/DL (ref 5–25)
CALCIUM ALBUM COR SERPL-MCNC: 9.4 MG/DL (ref 8.3–10.1)
CALCIUM SERPL-MCNC: 8.8 MG/DL (ref 8.4–10.2)
CHLORIDE SERPL-SCNC: 104 MMOL/L (ref 96–108)
CO2 SERPL-SCNC: 28 MMOL/L (ref 21–32)
CREAT SERPL-MCNC: 0.69 MG/DL (ref 0.6–1.3)
EOSINOPHIL # BLD AUTO: 0.32 THOUSAND/ÂΜL (ref 0–0.61)
EOSINOPHIL NFR BLD AUTO: 4 % (ref 0–6)
ERYTHROCYTE [DISTWIDTH] IN BLOOD BY AUTOMATED COUNT: 13.1 % (ref 11.6–15.1)
GFR SERPL CREATININE-BSD FRML MDRD: 114 ML/MIN/1.73SQ M
GLUCOSE SERPL-MCNC: 103 MG/DL (ref 65–140)
HCT VFR BLD AUTO: 43.1 % (ref 36.5–49.3)
HGB BLD-MCNC: 14.2 G/DL (ref 12–17)
IMM GRANULOCYTES # BLD AUTO: 0.1 THOUSAND/UL (ref 0–0.2)
IMM GRANULOCYTES NFR BLD AUTO: 1 % (ref 0–2)
LYMPHOCYTES # BLD AUTO: 1.75 THOUSANDS/ÂΜL (ref 0.6–4.47)
LYMPHOCYTES NFR BLD AUTO: 20 % (ref 14–44)
MCH RBC QN AUTO: 28.5 PG (ref 26.8–34.3)
MCHC RBC AUTO-ENTMCNC: 32.9 G/DL (ref 31.4–37.4)
MCV RBC AUTO: 87 FL (ref 82–98)
MONOCYTES # BLD AUTO: 0.8 THOUSAND/ÂΜL (ref 0.17–1.22)
MONOCYTES NFR BLD AUTO: 9 % (ref 4–12)
NEUTROPHILS # BLD AUTO: 5.88 THOUSANDS/ÂΜL (ref 1.85–7.62)
NEUTS SEG NFR BLD AUTO: 65 % (ref 43–75)
NRBC BLD AUTO-RTO: 0 /100 WBCS
PLATELET # BLD AUTO: 237 THOUSANDS/UL (ref 149–390)
PMV BLD AUTO: 10.4 FL (ref 8.9–12.7)
POTASSIUM SERPL-SCNC: 3.6 MMOL/L (ref 3.5–5.3)
PROT SERPL-MCNC: 6.9 G/DL (ref 6.4–8.4)
RBC # BLD AUTO: 4.98 MILLION/UL (ref 3.88–5.62)
SODIUM SERPL-SCNC: 138 MMOL/L (ref 135–147)
WBC # BLD AUTO: 8.9 THOUSAND/UL (ref 4.31–10.16)

## 2024-04-25 PROCEDURE — 85025 COMPLETE CBC W/AUTO DIFF WBC: CPT | Performed by: FAMILY MEDICINE

## 2024-04-25 PROCEDURE — 99233 SBSQ HOSP IP/OBS HIGH 50: CPT | Performed by: INTERNAL MEDICINE

## 2024-04-25 PROCEDURE — 99232 SBSQ HOSP IP/OBS MODERATE 35: CPT | Performed by: FAMILY MEDICINE

## 2024-04-25 PROCEDURE — 80053 COMPREHEN METABOLIC PANEL: CPT | Performed by: FAMILY MEDICINE

## 2024-04-25 RX ADMIN — ACETAMINOPHEN 325MG 650 MG: 325 TABLET ORAL at 15:30

## 2024-04-25 RX ADMIN — CEFAZOLIN SODIUM 2000 MG: 2 SOLUTION INTRAVENOUS at 03:16

## 2024-04-25 RX ADMIN — ACETAMINOPHEN 325MG 650 MG: 325 TABLET ORAL at 22:26

## 2024-04-25 RX ADMIN — FUROSEMIDE 40 MG: 10 INJECTION, SOLUTION INTRAMUSCULAR; INTRAVENOUS at 09:05

## 2024-04-25 RX ADMIN — CEFAZOLIN SODIUM 2000 MG: 2 SOLUTION INTRAVENOUS at 20:18

## 2024-04-25 RX ADMIN — CEFAZOLIN SODIUM 2000 MG: 2 SOLUTION INTRAVENOUS at 09:05

## 2024-04-25 RX ADMIN — ACETAMINOPHEN 325MG 650 MG: 325 TABLET ORAL at 05:12

## 2024-04-25 RX ADMIN — LISINOPRIL 20 MG: 20 TABLET ORAL at 09:05

## 2024-04-25 RX ADMIN — MORPHINE SULFATE 4 MG: 4 INJECTION INTRAVENOUS at 16:58

## 2024-04-25 RX ADMIN — CEFAZOLIN SODIUM 2000 MG: 2 SOLUTION INTRAVENOUS at 15:30

## 2024-04-25 NOTE — PROGRESS NOTES
Progress Note - Infectious Disease   Nicolás Balderrama 44 y.o. male MRN: [id number]  Unit/Bed#: -01 Encounter: [id number]        REQUIRED DOCUMENTATION:     1. This service was provided via Telemedicine.  2. Provider located at [place].  3. TeleMed provider: Tiff Contreras MD.  4. Identify all parties in room with patient during tele consult:RN  5. After connecting through televideo, patient was identified by name and date of birth and assistant checked wristband.  Patient was then informed that this was a Telemedicine visit and that the exam was being conducted confidentially over secure lines. My office door was closed. No one else was in the room.  Patient acknowledged consent and understanding of privacy and security of the Telemedicine visit, and gave us permission to have the assistant stay in the room in order to assist with the history and to conduct the exam.  I informed the patient that I have reviewed their record in Epic and presented the opportunity for them to ask any questions regarding the visit today.  The patient agreed to participate.       Assessment/Recommendations     Sepsis, POA  Recurrent right leg cellulitis  Possible sleep apnea, cor pulmonale  Morbid obesity  Chronic venous edema  History of tinea pedis     - Patient presents with recurrent cellulitis, sepsis in the setting of morbid obesity, chronic edema .  This is patient's second hospitalization in a year for the symptoms and fifth reported episode in the past year.  Patient's job involves prolonged standing and he denies wearing compression stockings which is the likely predisposing factor for his recurrent infection. Also concerned for sleep apnea and diastolic dysfunction as suspected on TTE as a contributing cause for his leg edema.  - Afebrile without leukocytosis, ongoing slow improvement due to extensive edema/poor antibiotic tissue absorption     Continue cefazolin 2 every 6  Follow-up blood cultures  Continue trial of  IV diuretics as tolerated and outpatient cardiology evaluation, sleep study   Continue wound care, serial exams, ace wraps, continue limb elevation  Anticipate patient may be able to be transitioned to po keflex 1g qid in the next 48-72 hrs, for 10 days of therapy through   Discussed natural history of cellulitis and discussed with patient that he is at risk for recurrent cellulitis/bacteremia if underlying risk factors cannot be modified, stressed the importance of weight loss, avoiding prolonged sitting or standing, skin and nail care and importance of compression stockings and diuretics to aid with venous edema.  If he continues to have recurrent cellulitis may consider offering him a trial of suppressive penicillin until underlying risk factors can be modified  Check daily CBC, CMP while inpatient to monitor for any evolving antibiotic toxicity or treatment failure    Discussed above plan in detail with the primary service who is in agreement.    History       Subjective:  The patient has no complaints. Notes ongoing but slowly improving leg pain , swelling and redness   Denies fevers, chills, or sweats.  Denies nausea, vomiting, or diarrhea.    Antibiotics:  Cefazolin      Physical Exam     Temp:  [97.2 °F (36.2 °C)-97.5 °F (36.4 °C)] 97.2 °F (36.2 °C)  HR:  [75-87] 75  Resp:  [16-20] 18  BP: (133-158)/(81-97) 138/85  SpO2:  [94 %-96 %] 94 %  Temp (24hrs), Av.3 °F (36.3 °C), Min:97.2 °F (36.2 °C), Max:97.5 °F (36.4 °C)  Current: Temperature: (!) 97.2 °F (36.2 °C)    Intake/Output Summary (Last 24 hours) at 2024 0837  Last data filed at 2024 1938  Gross per 24 hour   Intake 240 ml   Output --   Net 240 ml         Physical exam findings reported by bedside and primary medical team staff      General Appearance:  Appearing well, nontoxic, and in no distress, appears stated age   Lungs:   Clear to auscultation bilaterally, no audible wheezes, rhonchi and rales, respirations unlabored   Heart:   Regular rate and rhythm, S1, S2 normal, no murmur, rub or gallop   Abdomen:   Soft, non-tender, non-distended, positive bowel sounds, no masses, no organomegaly    No CVA tenderness   Extremities: Extremities normal, atraumatic, no cyanosis, clubbing , RLE > LLE edema 1-2+   Skin: Skin color, texture, turgor normal, fading circumferential erythema over right leg   Neurologic: Alert and oriented times 3,         Invasive Devices:   Peripheral IV 04/23/24 Left;Proximal;Ventral (anterior) Forearm (Active)   Site Assessment WDL 04/23/24 0610   Dressing Type Transparent 04/23/24 0610   Line Status Blood return noted;Flushed & Clamped 04/23/24 0610   Dressing Status Clean;Dry;Intact 04/23/24 0610   Dressing Change Due 04/27/24 04/23/24 0610   Reason Not Rotated Not due 04/23/24 0610       Labs, Imaging, & Other Studies     Lab Results:    I have personally reviewed pertinent labs.    Results from last 7 days   Lab Units 04/25/24  0455 04/24/24  0548 04/23/24  0609   WBC Thousand/uL 8.90 8.83 10.08   HEMOGLOBIN g/dL 14.2 13.4 14.1   PLATELETS Thousands/uL 237 214 198     Results from last 7 days   Lab Units 04/25/24  0455 04/24/24  0548 04/23/24  0609 04/22/24  0437   POTASSIUM mmol/L 3.6 3.6   < > 3.7   CHLORIDE mmol/L 104 105   < > 102   CO2 mmol/L 28 27   < > 24   BUN mg/dL 10 11   < > 13   CREATININE mg/dL 0.69 0.74   < > 0.93   EGFR ml/min/1.73sq m 114 111   < > 98   CALCIUM mg/dL 8.8 8.4   < > 8.6   AST U/L 19 20  --  24   ALT U/L 14 15  --  28   ALK PHOS U/L 93 87  --  82    < > = values in this interval not displayed.     Results from last 7 days   Lab Units 04/21/24  1227 04/21/24  1215   BLOOD CULTURE  No Growth at 72 hrs. No Growth at 72 hrs.       Imaging Studies:   I have personally reviewed pertinent imaging study reports and images in PACS.      EKG, Pathology, and Other Studies:   I have personally reviewed pertinent reports.        Counseling/Coordination of care:       Total 50 minutes communication  with the patient via telehealth.  Labs, medical tests and imaging studies were independently reviewed by me as noted above.

## 2024-04-25 NOTE — ASSESSMENT & PLAN NOTE
Medication:   Pending Prescriptions Disp Refills    clonazePAM (KLONOPIN) 1 MG tablet 60 tablet 0      Last Filled:  6/9/21     Patient Phone Number: [phone] (home) [phone] (work)    Last appt: 4/20/2021   Next appt: Visit date not found    PDMP Monitoring:    Last PDMP Farooq Miranda as Reviewed [facility]):  Review User Review Instant Review Result   Wendy Kat 4/20/2021 12:35 PM Reviewed PDMP [1]     Preferred Pharmacy:   [facility] [address], [address] [phone] - F [phone] Recurrent last time hospitalization prolonged course responded to high-dose Ancef will start Ancef 2 g IV every 6 hours  Venous duplex done negative for  Continue Lasix 20 mg daily-we will increase the dose to 40 mg.  Elevate leg  Infectious disease consultation  No history of MRSA  Pain control Tylenol and oxycodone  Considering patient's overall conditions, expect slow recovery.  Continue current treatment since patient slowly improving-Anticipate patient may be able to be transitioned to po keflex 1g qid in the next 48-72 hrs, for 10 days of therapy through 5/4

## 2024-04-25 NOTE — PROGRESS NOTES
[facility]  Progress Note  Name: Nicolás Balderrama I  MRN: [id number]  Unit/Bed#: -[facility]oretta I Date of Admission: 4/21/2024   Date of Service: 4/25/2024 I Hospital Day: 4    Assessment/Plan   * Cellulitis of right lower extremity  Assessment & Plan  Recurrent last time hospitalization prolonged course responded to high-dose Ancef will start Ancef 2 g IV every 6 hours  Venous duplex done negative for  Continue Lasix 20 mg daily-we will increase the dose to 40 mg.  Elevate leg  Infectious disease consultation  No history of MRSA  Pain control Tylenol and oxycodone  Considering patient's overall conditions, expect slow recovery.  Continue current treatment since patient slowly improving-Anticipate patient may be able to be transitioned to po keflex 1g qid in the next 48-72 hrs, for 10 days of therapy through 5/4     Abnormal echocardiogram  Assessment & Plan  Suboptimal study due to poor acoustic window.  Echo contrast was used.  Left ventricular is normal in size and function.  Right heart chambers appeared moderately enlarged (cor-pulomale ?)    On IV Lasix and monitor.  Will need outpatient follow-up with cardiology, may benefit with outpatient sleep study to rule out sleep apnea.    Morbid obesity (HCC)  Assessment & Plan  Counseled    Elevated bilirubin  Assessment & Plan  History of fatty liver. Elevated bilirubin which is chronic. Outpatient follow-up. Recommended dietary modification and weight loss.          VTE Pharmacologic Prophylaxis: VTE Score: 2 Low Risk (Score 0-2) - Encourage Ambulation.    Mobility:   Basic Mobility Inpatient Raw Score: 24  JH-HLM Goal: 8: Walk 250 feet or more  JH-HLM Achieved: 7: Walk 25 feet or more  JH-HLM Goal NOT achieved. Continue with multidisciplinary rounding and encourage appropriate mobility to improve upon JH-HLM goals.    Patient Centered Rounds: I performed bedside rounds with nursing staff today.   Discussions with Specialists or Other Care Team  Provider: Infectious disease    Education and Discussions with Family / Patient:  with patient.       Current Length of Stay: 4 day(s)  Current Patient Status: Inpatient   Certification Statement: The patient will continue to require additional inpatient hospital stay due to above condition  Discharge Plan: Anticipate discharge in 48-72 hrs to home.    Code Status: Level 1 - Full Code    Subjective:   Seen and evaluated during the time.  Resting comfortably.  He still having some pain in the lower extremity but improving.    Objective:     Vitals:   Temp (24hrs), Av.3 °F (36.3 °C), Min:97.2 °F (36.2 °C), Max:97.5 °F (36.4 °C)    Temp:  [97.2 °F (36.2 °C)-97.5 °F (36.4 °C)] 97.2 °F (36.2 °C)  HR:  [75-87] 75  Resp:  [16-20] 18  BP: (133-158)/(81-97) 138/85  SpO2:  [94 %-97 %] 97 %  Body mass index is 56 kg/m².     Input and Output Summary (last 24 hours):     Intake/Output Summary (Last 24 hours) at 2024 1136  Last data filed at 2024 1938  Gross per 24 hour   Intake 240 ml   Output --   Net 240 ml       Physical Exam:   Physical Exam  Vitals and nursing note reviewed.   Constitutional:       Appearance: Normal appearance. He is obese.   Cardiovascular:      Rate and Rhythm: Normal rate.      Heart sounds:      No friction rub. No gallop.   Pulmonary:      Effort: Pulmonary effort is normal. No respiratory distress.      Breath sounds: No stridor. No wheezing or rhonchi.   Musculoskeletal:         General: Swelling (right lower extremity) and tenderness (right lower extremity) present.   Skin:     Findings: Erythema (right lower extremity) present.   Neurological:      Mental Status: He is alert and oriented to person, place, and time.         Additional Data:     Labs:  Results from last 7 days   Lab Units 24  0455 24  0437 24  1215   WBC Thousand/uL 8.90   < > 16.73*   HEMOGLOBIN g/dL 14.2   < > 14.6   HEMATOCRIT % 43.1   < > 43.7   PLATELETS Thousands/uL 237   < > 206   BANDS PCT %   --   --  13*   SEGS PCT % 65   < >  --    LYMPHO PCT % 20   < > 3*   MONO PCT % 9   < > 1*   EOS PCT % 4   < > 0    < > = values in this interval not displayed.     Results from last 7 days   Lab Units 04/25/24  0455   SODIUM mmol/L 138   POTASSIUM mmol/L 3.6   CHLORIDE mmol/L 104   CO2 mmol/L 28   BUN mg/dL 10   CREATININE mg/dL 0.69   ANION GAP mmol/L 6   CALCIUM mg/dL 8.8   ALBUMIN g/dL 3.3*   TOTAL BILIRUBIN mg/dL 0.73   ALK PHOS U/L 93   ALT U/L 14   AST U/L 19   GLUCOSE RANDOM mg/dL 103     Results from last 7 days   Lab Units 04/21/24  1215   INR  1.20*             Results from last 7 days   Lab Units 04/21/24  1215   LACTIC ACID mmol/L 0.9   PROCALCITONIN ng/ml 12.44*       Lines/Drains:  Invasive Devices       Peripheral Intravenous Line  Duration             Peripheral IV 04/24/24 Left;Ventral (anterior) Forearm 1 day                          Imaging: No pertinent imaging reviewed.    Recent Cultures (last 7 days):   Results from last 7 days   Lab Units 04/21/24  1227 04/21/24  1215   BLOOD CULTURE  No Growth at 72 hrs. No Growth at 72 hrs.       Last 24 Hours Medication List:   Current Facility-Administered Medications   Medication Dose Route Frequency Provider Last Rate    acetaminophen  650 mg Oral Q6H PRN Katey Collins MD      cefazolin  2,000 mg Intravenous Q6H Katey Collins MD 2,000 mg (04/25/24 0905)    enoxaparin  60 mg Subcutaneous Q12H [facility] Katey Collins MD      lisinopril  20 mg Oral Daily Katey Collins MD      morphine injection  4 mg Intravenous Q2H PRN Leonardo Rueter, DO      oxyCODONE  5 mg Oral Q4H PRN Leonardo Rueter, DO      Or    oxyCODONE  10 mg Oral Q4H PRN Leonardo Rueter, DO          Today, Patient Was Seen By: Ashu Adler MD    **Please Note: This note may have been constructed using a voice recognition system.**

## 2024-04-25 NOTE — PLAN OF CARE
Problem: PAIN - ADULT  Goal: Verbalizes/displays adequate comfort level or baseline comfort level  Description: Interventions:  - Encourage patient to monitor pain and request assistance  - Assess pain using appropriate pain scale  - Administer analgesics based on type and severity of pain and evaluate response  - Implement non-pharmacological measures as appropriate and evaluate response  - Consider cultural and social influences on pain and pain management  - Notify physician/advanced practitioner if interventions unsuccessful or patient reports new pain  Outcome: Progressing     Problem: INFECTION - ADULT  Goal: Absence or prevention of progression during hospitalization  Description: INTERVENTIONS:  - Assess and monitor for signs and symptoms of infection  - Monitor lab/diagnostic results  - Monitor all insertion sites, i.e. indwelling lines, tubes, and drains  - Monitor endotracheal if appropriate and nasal secretions for changes in amount and color  - [place] appropriate cooling/warming therapies per order  - Administer medications as ordered  - Instruct and encourage patient and family to use good hand hygiene technique  - Identify and instruct in appropriate isolation precautions for identified infection/condition  Outcome: Progressing     Problem: SAFETY ADULT  Goal: Patient will remain free of falls  Description: INTERVENTIONS:  - Educate patient/family on patient safety including physical limitations  - Instruct patient to call for assistance with activity   - Consult OT/PT to assist with strengthening/mobility   - Keep Call bell within reach  - Keep bed low and locked with side rails adjusted as appropriate  - Keep care items and personal belongings within reach  - Initiate and maintain comfort rounds  - Make Fall Risk Sign visible to staff  - Apply yellow socks and bracelet for high fall risk patients  - Consider moving patient to room near nurses station  Outcome: Progressing  Goal: Maintain or  return to baseline ADL function  Description: INTERVENTIONS:  -  Assess patient's ability to carry out ADLs; assess patient's baseline for ADL function and identify physical deficits which impact ability to perform ADLs (bathing, care of mouth/teeth, toileting, grooming, dressing, etc.)  - Assess/evaluate cause of self-care deficits   - Assess range of motion  - Assess patient's mobility; develop plan if impaired  - Assess patient's need for assistive devices and provide as appropriate  - Encourage maximum independence but intervene and supervise when necessary  - Involve family in performance of ADLs  - Assess for home care needs following discharge   - Consider OT consult to assist with ADL evaluation and planning for discharge  - Provide patient education as appropriate  Outcome: Progressing  Goal: Maintains/Returns to pre admission functional level  Description: INTERVENTIONS:  - Perform AM-PAC 6 Click Basic Mobility/ Daily Activity assessment daily.  - Set and communicate daily mobility goal to care team and patient/family/caregiver.   - Collaborate with rehabilitation services on mobility goals if consulted  - Perform Range of Motion 3 times a day.  - Reposition patient every 2 hours.  - Dangle patient 3 times a day  - Stand patient 3 times a day  - Ambulate patient 3 times a day  - Out of bed to chair 3 times a day   - Out of bed for meals 3 times a day  - Out of bed for toileting  - Record patient progress and toleration of activity level   Outcome: Progressing     Problem: DISCHARGE PLANNING  Goal: Discharge to home or other facility with appropriate resources  Description: INTERVENTIONS:  - Identify barriers to discharge w/patient and caregiver  - Arrange for needed discharge resources and transportation as appropriate  - Identify discharge learning needs (meds, wound care, etc.)  - Arrange for interpretive services to assist at discharge as needed  - Refer to Case Management Department for coordinating  discharge planning if the patient needs post-hospital services based on physician/advanced practitioner order or complex needs related to functional status, cognitive ability, or social support system  Outcome: Progressing     Problem: Knowledge Deficit  Goal: Patient/family/caregiver demonstrates understanding of disease process, treatment plan, medications, and discharge instructions  Description: Complete learning assessment and assess knowledge base.  Interventions:  - Provide teaching at level of understanding  - Provide teaching via preferred learning methods  Outcome: Progressing

## 2024-04-26 ENCOUNTER — TRANSITIONAL CARE MANAGEMENT (OUTPATIENT)
Dept: FAMILY MEDICINE CLINIC | Facility: CLINIC | Age: 44
End: 2024-04-26

## 2024-04-26 VITALS
HEIGHT: 69 IN | BODY MASS INDEX: 46.65 KG/M2 | DIASTOLIC BLOOD PRESSURE: 79 MMHG | OXYGEN SATURATION: 94 % | RESPIRATION RATE: 18 BRPM | WEIGHT: 315 LBS | HEART RATE: 83 BPM | SYSTOLIC BLOOD PRESSURE: 126 MMHG | TEMPERATURE: 97.3 F

## 2024-04-26 PROBLEM — R17 ELEVATED BILIRUBIN: Status: RESOLVED | Noted: 2019-12-30 | Resolved: 2024-04-26

## 2024-04-26 LAB
ALBUMIN SERPL BCP-MCNC: 3.4 G/DL (ref 3.5–5)
ALP SERPL-CCNC: 95 U/L (ref 34–104)
ALT SERPL W P-5'-P-CCNC: 14 U/L (ref 7–52)
ANION GAP SERPL CALCULATED.3IONS-SCNC: 6 MMOL/L (ref 4–13)
AST SERPL W P-5'-P-CCNC: 21 U/L (ref 13–39)
BACTERIA BLD CULT: NORMAL
BACTERIA BLD CULT: NORMAL
BASOPHILS # BLD AUTO: 0.04 THOUSANDS/ÂΜL (ref 0–0.1)
BASOPHILS NFR BLD AUTO: 0 % (ref 0–1)
BILIRUB SERPL-MCNC: 0.72 MG/DL (ref 0.2–1)
BUN SERPL-MCNC: 13 MG/DL (ref 5–25)
CALCIUM ALBUM COR SERPL-MCNC: 9.5 MG/DL (ref 8.3–10.1)
CALCIUM SERPL-MCNC: 9 MG/DL (ref 8.4–10.2)
CHLORIDE SERPL-SCNC: 103 MMOL/L (ref 96–108)
CO2 SERPL-SCNC: 29 MMOL/L (ref 21–32)
CREAT SERPL-MCNC: 0.84 MG/DL (ref 0.6–1.3)
EOSINOPHIL # BLD AUTO: 0.47 THOUSAND/ÂΜL (ref 0–0.61)
EOSINOPHIL NFR BLD AUTO: 5 % (ref 0–6)
ERYTHROCYTE [DISTWIDTH] IN BLOOD BY AUTOMATED COUNT: 13 % (ref 11.6–15.1)
GFR SERPL CREATININE-BSD FRML MDRD: 105 ML/MIN/1.73SQ M
GLUCOSE SERPL-MCNC: 111 MG/DL (ref 65–140)
HCT VFR BLD AUTO: 41.5 % (ref 36.5–49.3)
HGB BLD-MCNC: 13.7 G/DL (ref 12–17)
IMM GRANULOCYTES # BLD AUTO: 0.21 THOUSAND/UL (ref 0–0.2)
IMM GRANULOCYTES NFR BLD AUTO: 2 % (ref 0–2)
LYMPHOCYTES # BLD AUTO: 2.31 THOUSANDS/ÂΜL (ref 0.6–4.47)
LYMPHOCYTES NFR BLD AUTO: 23 % (ref 14–44)
MCH RBC QN AUTO: 28.5 PG (ref 26.8–34.3)
MCHC RBC AUTO-ENTMCNC: 33 G/DL (ref 31.4–37.4)
MCV RBC AUTO: 87 FL (ref 82–98)
MONOCYTES # BLD AUTO: 0.8 THOUSAND/ÂΜL (ref 0.17–1.22)
MONOCYTES NFR BLD AUTO: 8 % (ref 4–12)
NEUTROPHILS # BLD AUTO: 6.09 THOUSANDS/ÂΜL (ref 1.85–7.62)
NEUTS SEG NFR BLD AUTO: 62 % (ref 43–75)
NRBC BLD AUTO-RTO: 0 /100 WBCS
PLATELET # BLD AUTO: 271 THOUSANDS/UL (ref 149–390)
PMV BLD AUTO: 10.4 FL (ref 8.9–12.7)
POTASSIUM SERPL-SCNC: 3.9 MMOL/L (ref 3.5–5.3)
PROT SERPL-MCNC: 7.2 G/DL (ref 6.4–8.4)
RBC # BLD AUTO: 4.8 MILLION/UL (ref 3.88–5.62)
SODIUM SERPL-SCNC: 138 MMOL/L (ref 135–147)
WBC # BLD AUTO: 9.92 THOUSAND/UL (ref 4.31–10.16)

## 2024-04-26 PROCEDURE — 85025 COMPLETE CBC W/AUTO DIFF WBC: CPT | Performed by: FAMILY MEDICINE

## 2024-04-26 PROCEDURE — 80053 COMPREHEN METABOLIC PANEL: CPT | Performed by: FAMILY MEDICINE

## 2024-04-26 PROCEDURE — 99239 HOSP IP/OBS DSCHRG MGMT >30: CPT | Performed by: FAMILY MEDICINE

## 2024-04-26 RX ORDER — CEPHALEXIN 500 MG/1
1000 CAPSULE ORAL EVERY 6 HOURS SCHEDULED
Qty: 64 CAPSULE | Refills: 0 | Status: SHIPPED | OUTPATIENT
Start: 2024-04-26 | End: 2024-05-04

## 2024-04-26 RX ADMIN — CEFAZOLIN SODIUM 2000 MG: 2 SOLUTION INTRAVENOUS at 09:16

## 2024-04-26 RX ADMIN — CEFAZOLIN SODIUM 2000 MG: 2 SOLUTION INTRAVENOUS at 03:37

## 2024-04-26 RX ADMIN — LISINOPRIL 20 MG: 20 TABLET ORAL at 09:16

## 2024-04-26 RX ADMIN — ACETAMINOPHEN 325MG 650 MG: 325 TABLET ORAL at 06:28

## 2024-04-26 NOTE — ASSESSMENT & PLAN NOTE
Counseled  Suspect he may have undiagnosed underlying sleep apnea.  Advised the patient to follow-up with PCP for further workup with possible sleep study outpatient basis.

## 2024-04-26 NOTE — ASSESSMENT & PLAN NOTE
Suboptimal study due to poor acoustic window.  Echo contrast was used.  Left ventricular is normal in size and function.  Right heart chambers appeared moderately enlarged (cor-pulomale ?)     Will need outpatient follow-up with cardiology, may benefit with outpatient sleep study to rule out sleep apnea.  May use Lasix as needed basis based on weight gain, if patient gain 2 to 3 pounds in 24 hours, can take 1 tablet.  Patient also advised to follow-up with PCP for further evaluation for possible sleep apnea.

## 2024-04-26 NOTE — ASSESSMENT & PLAN NOTE
Recurrent last time hospitalization prolonged course responded to high-dose Ancef will start Ancef 2 g IV every 6 hours  Venous duplex done negative for  Considering patient's overall conditions, expect slow recovery.  Continue current treatment since patient slowly improving-Anticipate patient may be able to be transitioned to po keflex 1g qid   for 10 days of therapy through 5/4

## 2024-04-26 NOTE — NURSING NOTE
IV removed. AVS reviewed with patient. Patient requests not to ace wrapped during the day but is sent home with ace wrap. Patient given work note. All questions answered.

## 2024-04-26 NOTE — CASE MANAGEMENT
Case Management Assessment & Discharge Planning Note    Patient name Nicolás Balderrama  Location /-01 MRN [id number]  : 1980 Date 2024       Current Admission Date: 2024  Current Admission Diagnosis:Cellulitis of right lower extremity   Patient Active Problem List    Diagnosis Date Noted    Abnormal echocardiogram 2024    Bilateral leg edema 2023    Leukocytosis 2023    BMI 50.0-59.9, adult (HCC) 2023    Cellulitis of right lower extremity 2019    Hypokalemia 2019    Morbid obesity (HCC) 2019      LOS (days): 5  Geometric Mean LOS (GMLOS) (days):   Days to GMLOS:     OBJECTIVE:    Risk of Unplanned Readmission Score: 7.45         Current admission status: Inpatient  Referral Reason:  (dc planning)    Preferred Pharmacy:   [id number] - [address] [address]  Phone: [phone] Fax: [phone]    Primary Care Provider: KELLI Palacios    Primary Insurance: AET  Secondary Insurance:     CM met with patient at the bedside,baseline information  was obtained. CM discussed the role of CM in helping the patient develop a discharge plan and assist the patient in carry out their plan.      ASSESSMENT:  Active Health Care Proxies    There are no active Health Care Proxies on file.       Advance Directives  Does patient have a Health Care POA?: No  Was patient offered paperwork?: Yes  Does patient currently have a Health Care decision maker?: Yes, please see Health Care Proxy section  Does patient have Advance Directives?: No  Was patient offered paperwork?: Yes  Primary Contact: Janell Saunders (Significant Other)  [phone]         Readmission Root Cause  30 Day Readmission: No    Patient Information  Admitted from:: Home  Mental Status: Alert  During Assessment patient was accompanied by: Not accompanied during assessment  Assessment information provided by::  Patient  Primary Caregiver: Self  Support Systems: Spouse/significant other, Parent  [place] Residence: [facility]  [place] [facility] do you live in?: [place]  Home entry access options. Select all that apply.: Stairs  Number of steps to enter home.: One Flight (11)  Do the steps have railings?: Yes  Type of Current Residence: 3 story home  Upon entering residence, is there a bedroom on the main floor (no further steps)?: No  A bedroom is located on the following floor levels of residence (select all that apply):: 2nd Floor  Upon entering residence, is there a bathroom on the main floor (no further steps)?: No  Indicate which floors of current residence have a bathroom (select all the apply):: 2nd Floor  Number of steps to 2nd floor from main floor: One Flight  Living Arrangements: Lives w/ Spouse/significant other (lives with children 10/12 and 18 years old)  Is patient a ?: No    Activities of Daily Living Prior to Admission  Functional Status: Independent  Completes ADLs independently?: Yes  Ambulates independently?: Yes  Does patient use assisted devices?: No  Does patient currently own DME?: No  Does patient have a history of Outpatient Therapy (PT/OT)?: No  Does the patient have a history of Short-Term Rehab?: No  Does patient have a history of HHC?: No  Does patient currently have HHC?: No         Patient Information Continued  Income Source: Employed  Does patient have prescription coverage?: Yes  Does patient receive dialysis treatments?: No  Does patient have a history of Mental Health Diagnosis?: No         Means of Transportation  Means of Transport to Appts:: Drives Self      Social Determinants of Health (SDOH)      Flowsheet Row Most Recent Value   Housing Stability    In the last 12 months, was there a time when you were not able to pay the mortgage or rent on time? N   In the last 12 months, how many places have you lived? 1   In the last 12 months, was there a time when you did not have a  steady place to sleep or slept in a shelter (including now)? N   Transportation Needs    In the past 12 months, has lack of transportation kept you from medical appointments or from getting medications? no   In the past 12 months, has lack of transportation kept you from meetings, work, or from getting things needed for daily living? No   Food Insecurity    Within the past 12 months, you worried that your food would run out before you got the money to buy more. Never true   Within the past 12 months, the food you bought just didn't last and you didn't have money to get more. Never true   Utilities    In the past 12 months has the electric, gas, oil, or water company threatened to shut off services in your home? No            DISCHARGE DETAILS:    Discharge planning discussed with:: patient  Freedom of Choice: Yes     CM contacted family/caregiver?: No- see comments (patient I/PTA,  declined)           Requested Home Health Care         Is the patient interested in HHC at discharge?: No    DME Referral Provided  Referral made for DME?: No              Treatment Team Recommendation: Home  Discharge Destination Plan:: Home      CM will continue to follow for and any dc needs, currently no needs anticipated.

## 2024-04-26 NOTE — PLAN OF CARE
Problem: PAIN - ADULT  Goal: Verbalizes/displays adequate comfort level or baseline comfort level  Description: Interventions:  - Encourage patient to monitor pain and request assistance  - Assess pain using appropriate pain scale  - Administer analgesics based on type and severity of pain and evaluate response  - Implement non-pharmacological measures as appropriate and evaluate response  - Consider cultural and social influences on pain and pain management  - Notify physician/advanced practitioner if interventions unsuccessful or patient reports new pain  Outcome: Progressing     Problem: INFECTION - ADULT  Goal: Absence or prevention of progression during hospitalization  Description: INTERVENTIONS:  - Assess and monitor for signs and symptoms of infection  - Monitor lab/diagnostic results  - Monitor all insertion sites, i.e. indwelling lines, tubes, and drains  - Monitor endotracheal if appropriate and nasal secretions for changes in amount and color  - [place] appropriate cooling/warming therapies per order  - Administer medications as ordered  - Instruct and encourage patient and family to use good hand hygiene technique  - Identify and instruct in appropriate isolation precautions for identified infection/condition  Outcome: Progressing     Problem: SAFETY ADULT  Goal: Patient will remain free of falls  Description: INTERVENTIONS:  - Educate patient/family on patient safety including physical limitations  - Instruct patient to call for assistance with activity   - Consult OT/PT to assist with strengthening/mobility   - Keep Call bell within reach  - Keep bed low and locked with side rails adjusted as appropriate  - Keep care items and personal belongings within reach  - Initiate and maintain comfort rounds  - Apply yellow socks and bracelet for high fall risk patients  - Consider moving patient to room near nurses station  Outcome: Progressing  Goal: Maintain or return to baseline ADL  function  Description: INTERVENTIONS:  -  Assess patient's ability to carry out ADLs; assess patient's baseline for ADL function and identify physical deficits which impact ability to perform ADLs (bathing, care of mouth/teeth, toileting, grooming, dressing, etc.)  - Assess/evaluate cause of self-care deficits   - Assess range of motion  - Assess patient's mobility; develop plan if impaired  - Assess patient's need for assistive devices and provide as appropriate  - Encourage maximum independence but intervene and supervise when necessary  - Involve family in performance of ADLs  - Assess for home care needs following discharge   - Consider OT consult to assist with ADL evaluation and planning for discharge  - Provide patient education as appropriate  Outcome: Progressing  Goal: Maintains/Returns to pre admission functional level  Description: INTERVENTIONS:  - Perform AM-PAC 6 Click Basic Mobility/ Daily Activity assessment daily.  - Set and communicate daily mobility goal to care team and patient/family/caregiver.   - Collaborate with rehabilitation services on mobility goals if consulted  - Perform Range of Motion 3 times a day.  - Reposition patient every 2 hours.  - Dangle patient 3 times a day  - Stand patient 3 times a day  - Ambulate patient 3 times a day  - Out of bed to chair 3 times a day   - Out of bed for meals 3 times a day  - Out of bed for toileting  - Record patient progress and toleration of activity level   Outcome: Progressing     Problem: DISCHARGE PLANNING  Goal: Discharge to home or other facility with appropriate resources  Description: INTERVENTIONS:  - Identify barriers to discharge w/patient and caregiver  - Arrange for needed discharge resources and transportation as appropriate  - Identify discharge learning needs (meds, wound care, etc.)  - Arrange for interpretive services to assist at discharge as needed  - Refer to Case Management Department for coordinating discharge planning if the  patient needs post-hospital services based on physician/advanced practitioner order or complex needs related to functional status, cognitive ability, or social support system  Outcome: Progressing     Problem: Knowledge Deficit  Goal: Patient/family/caregiver demonstrates understanding of disease process, treatment plan, medications, and discharge instructions  Description: Complete learning assessment and assess knowledge base.  Interventions:  - Provide teaching at level of understanding  - Provide teaching via preferred learning methods  Outcome: Progressing

## 2024-04-29 ENCOUNTER — OFFICE VISIT (OUTPATIENT)
Dept: FAMILY MEDICINE CLINIC | Facility: CLINIC | Age: 44
End: 2024-04-29
Payer: COMMERCIAL

## 2024-04-29 VITALS
SYSTOLIC BLOOD PRESSURE: 140 MMHG | OXYGEN SATURATION: 98 % | HEIGHT: 69 IN | WEIGHT: 315 LBS | HEART RATE: 83 BPM | BODY MASS INDEX: 46.65 KG/M2 | DIASTOLIC BLOOD PRESSURE: 98 MMHG

## 2024-04-29 DIAGNOSIS — R60.0 BILATERAL LEG EDEMA: ICD-10-CM

## 2024-04-29 DIAGNOSIS — L03.115 CELLULITIS OF RIGHT LOWER EXTREMITY: ICD-10-CM

## 2024-04-29 DIAGNOSIS — M79.604 RIGHT LEG PAIN: ICD-10-CM

## 2024-04-29 DIAGNOSIS — Z12.11 SCREENING FOR COLON CANCER: Primary | ICD-10-CM

## 2024-04-29 DIAGNOSIS — Z09 HOSPITAL DISCHARGE FOLLOW-UP: ICD-10-CM

## 2024-04-29 DIAGNOSIS — R93.1 ABNORMAL ECHOCARDIOGRAM: ICD-10-CM

## 2024-04-29 DIAGNOSIS — E66.01 MORBID OBESITY (HCC): ICD-10-CM

## 2024-04-29 PROCEDURE — 99496 TRANSJ CARE MGMT HIGH F2F 7D: CPT | Performed by: NURSE PRACTITIONER

## 2024-04-29 PROCEDURE — 1111F DSCHRG MED/CURRENT MED MERGE: CPT | Performed by: NURSE PRACTITIONER

## 2024-04-29 RX ORDER — OXYCODONE HYDROCHLORIDE 5 MG/1
5 TABLET ORAL EVERY 4 HOURS PRN
Qty: 30 TABLET | Refills: 0 | Status: SHIPPED | OUTPATIENT
Start: 2024-04-29

## 2024-04-29 NOTE — ASSESSMENT & PLAN NOTE
BMI Counseling: Body mass index is 55.38 kg/m². The BMI is above normal. Nutrition recommendations include reducing portion sizes, decreasing overall calorie intake, and 3-5 servings of fruits/vegetables daily. Exercise recommendations include moderate aerobic physical activity for 150 minutes/week and strength training exercises.

## 2024-04-29 NOTE — LETTER
April 29, 2024     Patient: Nicolás Balderrama  YOB: 1980  Date of Visit: 4/29/2024      To Whom it May Concern:    Nicolás Balderrama is under my professional care. Nicolás was seen in my office on 4/29/2024. Please allow Nicolás to participate in light duty with his employer due to physical limitations with ambulation at this time for 3 weeks.  He may return to full duty on 05/20/2024.       If you have any questions or concerns, please don't hesitate to call.         Sincerely,          KELLI Palacios

## 2024-04-29 NOTE — PROGRESS NOTES
TCM Call       Date and time call was made  2024 12:30 PM    Hospital care reviewed  Records reviewed    Patient was hospitialized at  [facility]    Date of Admission  24    Date of discharge  24    Disposition  Home    Were the patients medications reviewed and updated  No    Current Symptoms  None          TCM Call       Should patient be enrolled in anticoag monitoring?  No    Scheduled for follow up?  Yes    Did you obtain your prescribed medications  Yes    Do you need help managing your prescriptions or medications  No    Is transportation to your appointment needed  No    I have advised the patient to call PCP with any new or worsening symptoms  Leatha Mcneal MA    Living Arrangements  Spouse or Significiant other    Support System  Spouse    The type of support provided  Emotional; Financial; Physical    Do you have social support  Yes, as much as I need    Are you recieving any outpatient services  No    Are you recieving home care services  No    Are you using any community resources  No    Current waiver services  No    Have you fallen in the last 12 months  No    Interperter language line needed  No    Counseling  Patient; Family             Name: Nicolás Balderrama      : 1980      MRN: [id number]  Encounter Provider: KELLI Palacios  Encounter Date: 2024   Encounter department: [facility]    Assessment & Plan     1. Screening for colon cancer  -     Cologuard    2. Cellulitis of right lower extremity  Assessment & Plan:  Currently on Keflex PO for several more days.      Orders:  -     Ambulatory Referral to Cardiology; Future    3. Abnormal echocardiogram  Assessment & Plan:  Difficulty with accurate assessment due to pt size but upper chambers possibly enlarged moderately.    He was agreeable to a referral to cardiology.      Orders:  -     Ambulatory Referral to Cardiology; Future    4. Bilateral  leg edema  Assessment & Plan:  He will continue with furosemide daily for the present.      Orders:  -     Ambulatory Referral to Cardiology; Future    5. BMI 50.0-59.9, adult ([facility])    6. Hospital discharge follow-up    7. Right leg pain  -     oxyCODONE (Roxicodone) 5 immediate release tablet; Take 1 tablet (5 mg total) by mouth every 4 (four) hours as needed for moderate pain Max Daily Amount: 30 mg    8. Morbid obesity (HCC)  Assessment & Plan:  BMI Counseling: Body mass index is 55.38 kg/m². The BMI is above normal. Nutrition recommendations include reducing portion sizes, decreasing overall calorie intake, and 3-5 servings of fruits/vegetables daily. Exercise recommendations include moderate aerobic physical activity for 150 minutes/week and strength training exercises.        He is agreeable to a cardiology referral at this time, will hold off on the sleep study at the moment.      Paperwork completed for him to return to work as they have allowed him light duty in the past when he has leg issues.         Subjective      Here for a hospital follow-up.  He was admitted from 04/21/2024 to 04/26/2024 for right lower leg cellulitis - he is with a hx of chronic intermittent right lower leg cellulitis.  He has had duplexes done, scans etc with no identifiable cause.  He takes furosemide daily.  Incidences occur rather suddenly.  He was treated with abx during admission.  ECHO done with possible abnormalities.  Hospitalist suspected possible sleep apnea - recommending outpatient sleep study at this time.        Review of Systems   Constitutional: Negative.    Respiratory: Negative.     Cardiovascular:  Positive for leg swelling.   Musculoskeletal: Negative.    All other systems reviewed and are negative.      Current Outpatient Medications on File Prior to Visit   Medication Sig    cephalexin (KEFLEX) 500 mg capsule Take 2 capsules (1,000 mg total) by mouth every 6 (six) hours for 8 days    furosemide (LASIX) 20 mg  "tablet Take 1 tablet (20 mg total) by mouth daily    lisinopril (ZESTRIL) 20 mg tablet Take 1 tablet (20 mg total) by mouth daily    [DISCONTINUED] oxyCODONE (Roxicodone) 5 immediate release tablet Take 1 tablet (5 mg total) by mouth every 4 (four) hours as needed for moderate pain Max Daily Amount: 30 mg    clotrimazole (LOTRIMIN) 1 % cream Apply topically 2 (two) times a day       Objective     /98 (BP Location: Left arm, Patient Position: Sitting, Cuff Size: Extra-Large)   Pulse 83   Ht 5' 9\" (1.753 m)   Wt (!) 170 kg (375 lb)   SpO2 98%   BMI 55.38 kg/m²     Physical Exam  Cardiovascular:      Rate and Rhythm: Normal rate and regular rhythm.      Pulses:           Dorsalis pedis pulses are 2+ on the right side.      Comments: Right lower leg with venous discoloration but per picture pt provided of his leg on the day of his admission it is greatly improved.    Pulmonary:      Effort: Pulmonary effort is normal.      Breath sounds: Normal breath sounds.   Neurological:      Mental Status: He is alert.       KELLI Palacios    "

## 2024-04-29 NOTE — ASSESSMENT & PLAN NOTE
Difficulty with accurate assessment due to pt size but upper chambers possibly enlarged moderately.    He was agreeable to a referral to cardiology.

## 2024-04-29 NOTE — UTILIZATION REVIEW
NOTIFICATION OF ADMISSION DISCHARGE   This is a Notification of Discharge from [facility]. Please be advised that this patient has been discharge from our facility. Below you will find the admission and discharge date and time including the patient’s disposition.   UTILIZATION REVIEW CONTACT:  Elaine Martinez  Utilization   Network Utilization Review Department  Phone: [phone] x carefully listen to the prompts. All voicemails are confidential.  Email: [email or web address]     ADMISSION INFORMATION  PRESENTATION DATE: 4/21/2024 11:57 AM  OBERVATION ADMISSION DATE:   INPATIENT ADMISSION DATE: 4/21/24  1:12 PM   DISCHARGE DATE: 4/26/2024  1:36 PM   DISPOSITION:Home/Self Care    Network Utilization Review Department  ATTENTION: Please call with any questions or concerns to [phone] and carefully listen to the prompts so that you are directed to the right person. All voicemails are confidential.   For Discharge needs, contact Care Management DC Support Team at [phone] opt. 2  Send all requests for admission clinical reviews, approved or denied determinations and any other requests to dedicated fax number below belonging to the campus where the patient is receiving treatment. List of dedicated fax numbers for the Facilities:  FACILITY NAME UR FAX NUMBER   ADMISSION DENIALS (Administrative/Medical Necessity) [phone]   DISCHARGE SUPPORT TEAM ([facility]) [phone]   PARENT CHILD HEALTH (Maternity/NICU/Pediatrics) [phone]   [facility] [phone]   [facility] [phone]   [facility] [phone]   [facility] [phone]   [facility] [phone]   [facility] [phone]   [facility] [phone]   [facility]  [phone]   [facility] [phone]   [facility] [phone]   [facility] [phone]   [facility] [phone]

## 2024-05-20 ENCOUNTER — TELEPHONE (OUTPATIENT)
Age: 44
End: 2024-05-20

## 2024-05-20 NOTE — TELEPHONE ENCOUNTER
Pt called the office stating that he will like to come by the office and  his letter. Please review and print the letter out from the pts chart.

## 2024-05-22 LAB — COLOGUARD RESULT REPORTABLE: NEGATIVE

## 2024-05-28 ENCOUNTER — CONSULT (OUTPATIENT)
Dept: CARDIOLOGY CLINIC | Facility: CLINIC | Age: 44
End: 2024-05-28
Payer: COMMERCIAL

## 2024-05-28 VITALS
HEART RATE: 83 BPM | BODY MASS INDEX: 46.65 KG/M2 | WEIGHT: 315 LBS | OXYGEN SATURATION: 97 % | HEIGHT: 69 IN | SYSTOLIC BLOOD PRESSURE: 152 MMHG | DIASTOLIC BLOOD PRESSURE: 100 MMHG

## 2024-05-28 DIAGNOSIS — R60.0 LOCALIZED EDEMA: Primary | ICD-10-CM

## 2024-05-28 DIAGNOSIS — I51.7 RIGHT HEART ENLARGEMENT: ICD-10-CM

## 2024-05-28 DIAGNOSIS — I10 ESSENTIAL HYPERTENSION: ICD-10-CM

## 2024-05-28 PROCEDURE — 99204 OFFICE O/P NEW MOD 45 MIN: CPT | Performed by: INTERNAL MEDICINE

## 2024-05-28 RX ORDER — LISINOPRIL 40 MG/1
20 TABLET ORAL DAILY
Qty: 90 TABLET | Refills: 3 | Status: SHIPPED | OUTPATIENT
Start: 2024-05-28

## 2024-05-28 RX ORDER — SPIRONOLACTONE 25 MG/1
25 TABLET ORAL DAILY
Qty: 90 TABLET | Refills: 3 | Status: SHIPPED | OUTPATIENT
Start: 2024-05-28

## 2024-05-28 NOTE — ASSESSMENT & PLAN NOTE
His recent routine echocardiogram during hospitalization and April 2024 showed normal left ventricular systolic function with moderately enlarged right heart chambers.  Valves were not adequately visualized but no gross abnormalities were noted.  This may be secondary to undiagnosed sleep apnea or his morbid obesity.  We will send an ambulatory referral to sleep medicine for further evaluation.

## 2024-05-28 NOTE — ASSESSMENT & PLAN NOTE
Blood pressure still not optimal.  I have advised him to increase the lisinopril to 40 mg daily and keep a regular home blood pressure diary.  Target blood pressure is 130/80 or less.  He will continue to follow that up closely with his primary care physician.

## 2024-05-28 NOTE — ASSESSMENT & PLAN NOTE
I would recommend referral to weight loss management program based on his young age.  Fortunately he still does not have diabetes or significant dyslipidemia.

## 2024-05-28 NOTE — ASSESSMENT & PLAN NOTE
His recent bilateral lower extremity venous duplex study was negative for DVT.  He still has edema and evidence of chronic venous stasis on the right leg.  I would recommend adding spironolactone for synergistic response with the daily furosemide.  He should also use compression stockings every day.

## 2024-07-31 DIAGNOSIS — L23.7 POISON IVY: Primary | ICD-10-CM

## 2024-07-31 RX ORDER — PREDNISONE 10 MG/1
TABLET ORAL
Qty: 9 TABLET | Refills: 0 | Status: SHIPPED | OUTPATIENT
Start: 2024-07-31 | End: 2024-08-05

## 2024-09-11 DIAGNOSIS — R60.0 LOCALIZED EDEMA: ICD-10-CM

## 2024-09-11 DIAGNOSIS — I10 ESSENTIAL HYPERTENSION: ICD-10-CM

## 2024-09-11 DIAGNOSIS — R60.0 LEG EDEMA: ICD-10-CM

## 2024-09-12 RX ORDER — SPIRONOLACTONE 25 MG/1
25 TABLET ORAL DAILY
Qty: 90 TABLET | Refills: 1 | Status: SHIPPED | OUTPATIENT
Start: 2024-09-12

## 2024-09-12 RX ORDER — FUROSEMIDE 20 MG
20 TABLET ORAL DAILY
Qty: 90 TABLET | Refills: 0 | Status: SHIPPED | OUTPATIENT
Start: 2024-09-12

## 2024-10-16 DIAGNOSIS — L03.115 CELLULITIS OF RIGHT LOWER LEG: ICD-10-CM

## 2024-10-16 DIAGNOSIS — L03.115 CELLULITIS OF RIGHT LOWER EXTREMITY: ICD-10-CM

## 2024-10-16 DIAGNOSIS — M79.604 RIGHT LEG PAIN: ICD-10-CM

## 2024-10-16 RX ORDER — OXYCODONE HYDROCHLORIDE 5 MG/1
5 TABLET ORAL EVERY 4 HOURS PRN
Qty: 30 TABLET | Refills: 0 | Status: SHIPPED | OUTPATIENT
Start: 2024-10-16

## 2024-10-16 RX ORDER — CEPHALEXIN 500 MG/1
1000 CAPSULE ORAL EVERY 6 HOURS SCHEDULED
Qty: 64 CAPSULE | Refills: 0 | Status: SHIPPED | OUTPATIENT
Start: 2024-10-16 | End: 2024-10-18 | Stop reason: SDUPTHER

## 2024-10-16 RX ORDER — SULFAMETHOXAZOLE AND TRIMETHOPRIM 800; 160 MG/1; MG/1
1 TABLET ORAL 2 TIMES DAILY
Qty: 20 TABLET | Refills: 0 | Status: SHIPPED | OUTPATIENT
Start: 2024-10-16 | End: 2024-10-26

## 2024-10-17 ENCOUNTER — TELEPHONE (OUTPATIENT)
Age: 44
End: 2024-10-17

## 2024-10-17 NOTE — TELEPHONE ENCOUNTER
Pt called back again stating not heard yet on his work note. Kindly call him back once it ready for his pickup today.    Thanks

## 2024-10-17 NOTE — TELEPHONE ENCOUNTER
Last visit 04/29/2024  No upcoming appt     Patient is asking for the medical excuse to include being on light duty and a sedentary job for the next 2 weeks, just until the redness and swelling goes down on his right leg. A similar excuse was written on 12/07/2023, the second one in the chart. Patient does not want to stay home, he prefers to work and be on light duty. Patient needs this excuse asap, since he goes to work today at 3pm. Please call patient, so he can  the excuse. Please advise.

## 2024-10-17 NOTE — TELEPHONE ENCOUNTER
Spoke with pt and let him know that the notes he is requesting are printed and ready for pickup at his earliest convience. Pt stated that he would arrive for them within the hour.    Forms in pt pickup bin.

## 2024-10-18 DIAGNOSIS — L03.115 CELLULITIS OF RIGHT LOWER EXTREMITY: ICD-10-CM

## 2024-10-18 DIAGNOSIS — L03.115 CELLULITIS OF RIGHT LOWER LEG: ICD-10-CM

## 2024-10-18 RX ORDER — CEPHALEXIN 500 MG/1
1000 CAPSULE ORAL EVERY 6 HOURS SCHEDULED
Qty: 64 CAPSULE | Refills: 0 | Status: SHIPPED | OUTPATIENT
Start: 2024-10-18 | End: 2024-10-26

## 2024-11-20 NOTE — LETTER
June 4, 2021     Patient: uJlianne Hubbard   YOB: 1980   Date of Visit: 6/4/2021       To Whom it May Concern:    Julianne Hubbard is under my professional care  He was seen in my office on 6/4/2021  He was re-evaluated today for his blood pressure  His values are beginning to decrease with the additional medication  Due to this decrease, he may continue with the PCE testing for his employment  If you have any questions or concerns, please don't hesitate to call           SincerelyKun
Yes

## 2024-12-01 DIAGNOSIS — R60.0 LEG EDEMA: ICD-10-CM

## 2024-12-03 RX ORDER — FUROSEMIDE 20 MG/1
20 TABLET ORAL DAILY
Qty: 90 TABLET | Refills: 0 | Status: SHIPPED | OUTPATIENT
Start: 2024-12-03

## 2025-03-15 DIAGNOSIS — R60.0 LOCALIZED EDEMA: ICD-10-CM

## 2025-03-15 DIAGNOSIS — R60.0 LEG EDEMA: ICD-10-CM

## 2025-03-15 DIAGNOSIS — I10 ESSENTIAL HYPERTENSION: ICD-10-CM

## 2025-03-17 RX ORDER — FUROSEMIDE 20 MG/1
20 TABLET ORAL DAILY
Qty: 90 TABLET | Refills: 0 | Status: SHIPPED | OUTPATIENT
Start: 2025-03-17

## 2025-03-17 RX ORDER — SPIRONOLACTONE 25 MG/1
25 TABLET ORAL DAILY
Qty: 90 TABLET | Refills: 0 | Status: SHIPPED | OUTPATIENT
Start: 2025-03-17

## 2025-03-17 RX ORDER — LISINOPRIL 40 MG/1
20 TABLET ORAL DAILY
Qty: 90 TABLET | Refills: 0 | Status: SHIPPED | OUTPATIENT
Start: 2025-03-17

## 2025-03-24 DIAGNOSIS — M79.604 RIGHT LEG PAIN: ICD-10-CM

## 2025-03-24 RX ORDER — OXYCODONE HYDROCHLORIDE 5 MG/1
5 TABLET ORAL EVERY 4 HOURS PRN
Qty: 30 TABLET | Refills: 0 | Status: SHIPPED | OUTPATIENT
Start: 2025-03-24

## 2025-04-01 ENCOUNTER — TELEPHONE (OUTPATIENT)
Dept: CARDIOLOGY CLINIC | Facility: CLINIC | Age: 45
End: 2025-04-01

## 2025-05-22 ENCOUNTER — HOSPITAL ENCOUNTER (INPATIENT)
Facility: HOSPITAL | Age: 45
LOS: 7 days | Discharge: HOME | DRG: 872 | End: 2025-05-29
Attending: EMERGENCY MEDICINE | Admitting: FAMILY MEDICINE
Payer: COMMERCIAL

## 2025-05-22 ENCOUNTER — APPOINTMENT (EMERGENCY)
Dept: RADIOLOGY | Facility: HOSPITAL | Age: 45
DRG: 872 | End: 2025-05-22
Payer: COMMERCIAL

## 2025-05-22 DIAGNOSIS — L03.115 CELLULITIS OF RIGHT LOWER LEG: Primary | ICD-10-CM

## 2025-05-22 DIAGNOSIS — I10 ESSENTIAL HYPERTENSION: ICD-10-CM

## 2025-05-22 DIAGNOSIS — R60.0 LOCALIZED EDEMA: ICD-10-CM

## 2025-05-22 LAB
ALBUMIN SERPL BCG-MCNC: 3.8 G/DL (ref 3.5–5)
ALP SERPL-CCNC: 80 U/L (ref 34–104)
ALT SERPL W P-5'-P-CCNC: 34 U/L (ref 7–52)
ANION GAP SERPL CALCULATED.3IONS-SCNC: 8 MMOL/L (ref 4–13)
AST SERPL W P-5'-P-CCNC: 32 U/L (ref 13–39)
BASOPHILS # BLD AUTO: 0.03 THOUSANDS/ÂΜL (ref 0–0.1)
BASOPHILS NFR BLD AUTO: 0 % (ref 0–1)
BILIRUB SERPL-MCNC: 1.69 MG/DL (ref 0.2–1)
BUN SERPL-MCNC: 16 MG/DL (ref 5–25)
CALCIUM SERPL-MCNC: 9.1 MG/DL (ref 8.4–10.2)
CHLORIDE SERPL-SCNC: 101 MMOL/L (ref 96–108)
CO2 SERPL-SCNC: 26 MMOL/L (ref 21–32)
CREAT SERPL-MCNC: 0.96 MG/DL (ref 0.6–1.3)
EOSINOPHIL # BLD AUTO: 0.02 THOUSAND/ÂΜL (ref 0–0.61)
EOSINOPHIL NFR BLD AUTO: 0 % (ref 0–6)
ERYTHROCYTE [DISTWIDTH] IN BLOOD BY AUTOMATED COUNT: 12.9 % (ref 11.6–15.1)
GFR SERPL CREATININE-BSD FRML MDRD: 94 ML/MIN/1.73SQ M
GLUCOSE SERPL-MCNC: 96 MG/DL (ref 65–140)
HCT VFR BLD AUTO: 45.7 % (ref 36.5–49.3)
HGB BLD-MCNC: 15.1 G/DL (ref 12–17)
IMM GRANULOCYTES # BLD AUTO: 0.07 THOUSAND/UL (ref 0–0.2)
IMM GRANULOCYTES NFR BLD AUTO: 1 % (ref 0–2)
LACTATE SERPL-SCNC: 1.2 MMOL/L (ref 0.5–2)
LYMPHOCYTES # BLD AUTO: 1.08 THOUSANDS/ÂΜL (ref 0.6–4.47)
LYMPHOCYTES NFR BLD AUTO: 8 % (ref 14–44)
MCH RBC QN AUTO: 29.2 PG (ref 26.8–34.3)
MCHC RBC AUTO-ENTMCNC: 33 G/DL (ref 31.4–37.4)
MCV RBC AUTO: 88 FL (ref 82–98)
MONOCYTES # BLD AUTO: 0.59 THOUSAND/ÂΜL (ref 0.17–1.22)
MONOCYTES NFR BLD AUTO: 4 % (ref 4–12)
NEUTROPHILS # BLD AUTO: 12.17 THOUSANDS/ÂΜL (ref 1.85–7.62)
NEUTS SEG NFR BLD AUTO: 87 % (ref 43–75)
NRBC BLD AUTO-RTO: 0 /100 WBCS
PLATELET # BLD AUTO: 203 THOUSANDS/UL (ref 149–390)
PMV BLD AUTO: 11.7 FL (ref 8.9–12.7)
POTASSIUM SERPL-SCNC: 3.7 MMOL/L (ref 3.5–5.3)
PROCALCITONIN SERPL-MCNC: 6.78 NG/ML
PROT SERPL-MCNC: 7.3 G/DL (ref 6.4–8.4)
RBC # BLD AUTO: 5.18 MILLION/UL (ref 3.88–5.62)
SODIUM SERPL-SCNC: 135 MMOL/L (ref 135–147)
WBC # BLD AUTO: 13.96 THOUSAND/UL (ref 4.31–10.16)

## 2025-05-22 PROCEDURE — 83605 ASSAY OF LACTIC ACID: CPT | Performed by: EMERGENCY MEDICINE

## 2025-05-22 PROCEDURE — 96375 TX/PRO/DX INJ NEW DRUG ADDON: CPT

## 2025-05-22 PROCEDURE — 99283 EMERGENCY DEPT VISIT LOW MDM: CPT

## 2025-05-22 PROCEDURE — 84145 PROCALCITONIN (PCT): CPT | Performed by: EMERGENCY MEDICINE

## 2025-05-22 PROCEDURE — 73590 X-RAY EXAM OF LOWER LEG: CPT

## 2025-05-22 PROCEDURE — 87040 BLOOD CULTURE FOR BACTERIA: CPT | Performed by: EMERGENCY MEDICINE

## 2025-05-22 PROCEDURE — 80053 COMPREHEN METABOLIC PANEL: CPT | Performed by: EMERGENCY MEDICINE

## 2025-05-22 PROCEDURE — 96365 THER/PROPH/DIAG IV INF INIT: CPT

## 2025-05-22 PROCEDURE — 99223 1ST HOSP IP/OBS HIGH 75: CPT | Performed by: NURSE PRACTITIONER

## 2025-05-22 PROCEDURE — 99285 EMERGENCY DEPT VISIT HI MDM: CPT | Performed by: EMERGENCY MEDICINE

## 2025-05-22 PROCEDURE — 36415 COLL VENOUS BLD VENIPUNCTURE: CPT | Performed by: EMERGENCY MEDICINE

## 2025-05-22 PROCEDURE — 85025 COMPLETE CBC W/AUTO DIFF WBC: CPT | Performed by: EMERGENCY MEDICINE

## 2025-05-22 PROCEDURE — 96376 TX/PRO/DX INJ SAME DRUG ADON: CPT

## 2025-05-22 RX ORDER — CEFAZOLIN SODIUM 2 G/50ML
2000 SOLUTION INTRAVENOUS EVERY 8 HOURS
Status: DISCONTINUED | OUTPATIENT
Start: 2025-05-23 | End: 2025-05-23

## 2025-05-22 RX ORDER — ENOXAPARIN SODIUM 100 MG/ML
60 INJECTION SUBCUTANEOUS 2 TIMES DAILY
Status: DISCONTINUED | OUTPATIENT
Start: 2025-05-23 | End: 2025-05-29 | Stop reason: HOSPADM

## 2025-05-22 RX ORDER — MORPHINE SULFATE 4 MG/ML
4 INJECTION, SOLUTION INTRAMUSCULAR; INTRAVENOUS ONCE
Status: COMPLETED | OUTPATIENT
Start: 2025-05-22 | End: 2025-05-22

## 2025-05-22 RX ORDER — KETOROLAC TROMETHAMINE 30 MG/ML
15 INJECTION, SOLUTION INTRAMUSCULAR; INTRAVENOUS ONCE
Status: COMPLETED | OUTPATIENT
Start: 2025-05-22 | End: 2025-05-22

## 2025-05-22 RX ORDER — CEFAZOLIN SODIUM 2 G/50ML
2000 SOLUTION INTRAVENOUS ONCE
Status: COMPLETED | OUTPATIENT
Start: 2025-05-22 | End: 2025-05-22

## 2025-05-22 RX ADMIN — CEFAZOLIN SODIUM 2000 MG: 2 SOLUTION INTRAVENOUS at 23:08

## 2025-05-22 RX ADMIN — SODIUM CHLORIDE 1000 ML: 0.9 INJECTION, SOLUTION INTRAVENOUS at 19:42

## 2025-05-22 RX ADMIN — KETOROLAC TROMETHAMINE 15 MG: 30 INJECTION, SOLUTION INTRAMUSCULAR; INTRAVENOUS at 20:40

## 2025-05-22 RX ADMIN — MORPHINE SULFATE 4 MG: 4 INJECTION INTRAVENOUS at 22:45

## 2025-05-22 RX ADMIN — KETOROLAC TROMETHAMINE 15 MG: 30 INJECTION, SOLUTION INTRAMUSCULAR; INTRAVENOUS at 19:42

## 2025-05-22 RX ADMIN — CEFAZOLIN SODIUM 2000 MG: 2 SOLUTION INTRAVENOUS at 19:42

## 2025-05-22 NOTE — ED PROVIDER NOTES
Time reflects when diagnosis was documented in both MDM as applicable and the Disposition within this note       Time User Action Codes Description Comment    5/22/2025  9:05 PM Mahad Chaney Add [L03.115] Cellulitis of right lower leg           ED Disposition       ED Disposition   Admit    Condition   Stable    Date/Time   Thu May 22, 2025  9:04 PM    Comment   Case was discussed with KELLI Busch and the patient's admission status was agreed to be Admission Status: inpatient status to the service of Dr. Adler.               Assessment & Plan       Medical Decision Making  Patient was seen and evaluated for his presentation as outlined.  Patient at risk for recurrent cellulitis, necrotizing fasciitis, osteomyelitis, organ dysfunction, other.  Exam was consistent with cellulitis.  No evidence of subcutaneous air noted on x-ray.  No bony involvement noted.  Elevated white blood cell count as well as elevated procalcitonin.  Clinical picture concerning for cellulitis.  Not improving despite high-dose antibiotics as outpatient as described in HPI.  Given Ancef in the ED as well as anti-inflammatory medication.  Hemodynamically stable.  Based on patient's reported history of prior episodes of cellulitis refractory to outpatient management, hospitalist was consulted for admission.  Case discussed with KELLI Busch, who accepted the patient for admission to the hospital service under Dr. Adler.    Amount and/or Complexity of Data Reviewed  Labs: ordered.  Radiology: ordered and independent interpretation performed.    Risk  Prescription drug management.  Decision regarding hospitalization.             Medications   ceFAZolin (ANCEF) IVPB (premix in dextrose) 2,000 mg 50 mL (0 mg Intravenous Stopped 5/22/25 2012)   ketorolac (TORADOL) injection 15 mg (15 mg Intravenous Given 5/22/25 1942)   sodium chloride 0.9 % bolus 1,000 mL (0 mL Intravenous Stopped 5/22/25 2042)   ketorolac (TORADOL) injection 15 mg (15 mg  Intravenous Given 5/22/25 2040)       ED Risk Strat Scores                    No data recorded        SBIRT 22yo+      Flowsheet Row Most Recent Value   Initial Alcohol Screen: US AUDIT-C     1. How often do you have a drink containing alcohol? 0 Filed at: 05/22/2025 1636   2. How many drinks containing alcohol do you have on a typical day you are drinking?  0 Filed at: 05/22/2025 1636   3a. Male UNDER 65: How often do you have five or more drinks on one occasion? 0 Filed at: 05/22/2025 1636   3b. FEMALE Any Age, or MALE 65+: How often do you have 4 or more drinks on one occassion? 0 Filed at: 05/22/2025 1636   Audit-C Score 0 Filed at: 05/22/2025 1636   MANOJ: How many times in the past year have you...    Used an illegal drug or used a prescription medication for non-medical reasons? Never Filed at: 05/22/2025 1636                            History of Present Illness       Chief Complaint   Patient presents with    Cellulitis     Right leg, patient reports starting with a fever Tuesday night and then starting with redness and swelling.       Past Medical History[1]   Past Surgical History[2]   Family History[3]   Social History[4]   E-Cigarette/Vaping    E-Cigarette Use Never User       E-Cigarette/Vaping Substances    Nicotine No     THC No     CBD No     Flavoring No     Other No     Unknown No       I have reviewed and agree with the history as documented.     Patient presents to the emergency department for evaluation of right lower leg redness and swelling concerning for cellulitis.  Patient states that he started having a fever on Tuesday night while at work.  Fever has been as high as 102.  Complains of increased pain, swelling, redness.  Has had similar cellulitis in the past requiring hospital admission.  States this is the third episode in 3 years, occurring around a similar time of year.  Denies any open wounds or tight fitting clothing.  Review of records reveals hospital admission last April, given  Ancef with improvement.  Patient was prescribed Keflex yesterday 1000 mg every 6 hours.  Started the medication yesterday, continued today without improvement.  Denies any nausea or vomiting.  Pain is 10 out of 10.  Taking Tylenol at home.  Denies history of diabetes.  No other complaints, modifying factors, or associated symptoms.        Review of Systems   All other systems reviewed and are negative.          Objective       ED Triage Vitals   Temperature Pulse Blood Pressure Respirations SpO2 Patient Position - Orthostatic VS   05/22/25 1636 05/22/25 1636 05/22/25 1636 05/22/25 1636 05/22/25 1636 05/22/25 1636   99.6 °F (37.6 °C) (!) 114 (!) 180/118 18 99 % Sitting      Temp Source Heart Rate Source BP Location FiO2 (%) Pain Score    05/22/25 1636 05/22/25 1636 05/22/25 2047 -- 05/22/25 1636    Temporal Monitor Left arm  10 - Worst Possible Pain      Vitals      Date and Time Temp Pulse SpO2 Resp BP Pain Score FACES Pain Rating User   05/22/25 2245 -- -- -- -- -- 8 -- MA   05/22/25 2225 98.1 °F (36.7 °C) 100 100 % 20 127/89 -- -- MA   05/22/25 2222 98.1 °F (36.7 °C) 100 96 % 18 127/89 -- -- DII   05/22/25 2100 -- 103 96 % -- 149/86 -- -- SR   05/22/25 2047 -- 114 98 % 18 141/98 -- -- SR   05/22/25 2040 -- -- -- -- -- 8 -- SR   05/22/25 1942 -- -- -- -- -- 10 - Worst Possible Pain -- BF   05/22/25 1636 99.6 °F (37.6 °C) 114 99 % 18 180/118 10 - Worst Possible Pain -- AS            Physical Exam  Vitals and nursing note reviewed.   Constitutional:       General: He is not in acute distress.     Appearance: He is well-developed. He is obese. He is not ill-appearing.   HENT:      Head: Normocephalic and atraumatic.     Eyes:      Conjunctiva/sclera: Conjunctivae normal.       Cardiovascular:      Rate and Rhythm: Regular rhythm. Tachycardia present.      Pulses: Normal pulses.      Heart sounds: Normal heart sounds. No murmur heard.     No friction rub. No gallop.   Pulmonary:      Effort: Pulmonary effort is normal.  No respiratory distress.      Breath sounds: Normal breath sounds. No wheezing, rhonchi or rales.   Abdominal:      General: Abdomen is flat. There is no distension.      Palpations: Abdomen is soft.      Tenderness: There is no abdominal tenderness. There is no guarding or rebound.     Musculoskeletal:         General: Swelling present. No deformity or signs of injury.      Cervical back: Normal range of motion and neck supple.      Comments: Erythema, swelling, tenderness, warmth noted to the right lower leg between the ankle and the knee.  No open wounds or active drainage appreciated.  No fluctuance noted, mildly indurated.     Skin:     General: Skin is warm and dry.      Capillary Refill: Capillary refill takes less than 2 seconds.      Findings: Erythema present.     Neurological:      General: No focal deficit present.      Mental Status: He is alert and oriented to person, place, and time.     Psychiatric:         Mood and Affect: Mood normal.         Behavior: Behavior normal.         Results Reviewed       Procedure Component Value Units Date/Time    Blood culture #1 [[id number]] Collected: 05/22/25 1941    Lab Status: In process Specimen: Blood from Arm, Left Updated: 05/22/25 1947    Procalcitonin [[id number]]  (Abnormal) Collected: 05/22/25 1825    Lab Status: Final result Specimen: Blood from Arm, Left Updated: 05/22/25 1901     Procalcitonin 6.78 ng/ml     Lactic acid, plasma (w/reflex if result > 2.0) [[id number]]  (Normal) Collected: 05/22/25 1825    Lab Status: Final result Specimen: Blood from Arm, Left Updated: 05/22/25 1854     LACTIC ACID 1.2 mmol/L     Narrative:      Result may be elevated if tourniquet was used during collection.    Comprehensive metabolic panel [[id number]]  (Abnormal) Collected: 05/22/25 1825    Lab Status: Final result Specimen: Blood from Arm, Left Updated: 05/22/25 1854     Sodium 135 mmol/L      Potassium 3.7 mmol/L      Chloride 101 mmol/L      CO2 26 mmol/L      ANION  GAP 8 mmol/L      BUN 16 mg/dL      Creatinine 0.96 mg/dL      Glucose 96 mg/dL      Calcium 9.1 mg/dL      AST 32 U/L      ALT 34 U/L      Alkaline Phosphatase 80 U/L      Total Protein 7.3 g/dL      Albumin 3.8 g/dL      Total Bilirubin 1.69 mg/dL      eGFR 94 ml/min/1.73sq m     Narrative:      National Kidney Disease Foundation guidelines for Chronic Kidney Disease (CKD):     Stage 1 with normal or high GFR (GFR > 90 mL/min/1.73 square meters)    Stage 2 Mild CKD (GFR = 60-89 mL/min/1.73 square meters)    Stage 3A Moderate CKD (GFR = 45-59 mL/min/1.73 square meters)    Stage 3B Moderate CKD (GFR = 30-44 mL/min/1.73 square meters)    Stage 4 Severe CKD (GFR = 15-29 mL/min/1.73 square meters)    Stage 5 End Stage CKD (GFR <15 mL/min/1.73 square meters)  Note: GFR calculation is accurate only with a steady state creatinine    CBC and differential [[id number]]  (Abnormal) Collected: 05/22/25 1825    Lab Status: Final result Specimen: Blood from Arm, Left Updated: 05/22/25 1832     WBC 13.96 Thousand/uL      RBC 5.18 Million/uL      Hemoglobin 15.1 g/dL      Hematocrit 45.7 %      MCV 88 fL      MCH 29.2 pg      MCHC 33.0 g/dL      RDW 12.9 %      MPV 11.7 fL      Platelets 203 Thousands/uL      nRBC 0 /100 WBCs      Segmented % 87 %      Immature Grans % 1 %      Lymphocytes % 8 %      Monocytes % 4 %      Eosinophils Relative 0 %      Basophils Relative 0 %      Absolute Neutrophils 12.17 Thousands/µL      Absolute Immature Grans 0.07 Thousand/uL      Absolute Lymphocytes 1.08 Thousands/µL      Absolute Monocytes 0.59 Thousand/µL      Eosinophils Absolute 0.02 Thousand/µL      Basophils Absolute 0.03 Thousands/µL     Blood culture #2 [[id number]] Collected: 05/22/25 1825    Lab Status: In process Specimen: Blood from Arm, Left Updated: 05/22/25 1830            XR tibia fibula 2 views RIGHT   ED Interpretation by Mahad Chaney MD (05/22 2025)   No subcutaneous air or acute osseous abnormality appreciated.           Procedures    ED Medication and Procedure Management   Prior to Admission Medications   Prescriptions Last Dose Informant Patient Reported? Taking?   clotrimazole (LOTRIMIN) 1 % cream   No No   Sig: Apply topically 2 (two) times a day   furosemide (LASIX) 20 mg tablet   No No   Sig: Take 1 tablet (20 mg total) by mouth daily   lisinopril (ZESTRIL) 40 mg tablet   No No   Sig: Take 0.5 tablets (20 mg total) by mouth daily   oxyCODONE (Roxicodone) 5 immediate release tablet   No No   Sig: Take 1 tablet (5 mg total) by mouth every 4 (four) hours as needed for moderate pain Max Daily Amount: 30 mg   spironolactone (ALDACTONE) 25 mg tablet   No No   Sig: Take 1 tablet (25 mg total) by mouth daily      Facility-Administered Medications: None     Current Discharge Medication List        CONTINUE these medications which have NOT CHANGED    Details   clotrimazole (LOTRIMIN) 1 % cream Apply topically 2 (two) times a day  Qty: 30 g, Refills: 0    Associated Diagnoses: Tinea pedis      furosemide (LASIX) 20 mg tablet Take 1 tablet (20 mg total) by mouth daily  Qty: 90 tablet, Refills: 0    Associated Diagnoses: Leg edema      lisinopril (ZESTRIL) 40 mg tablet Take 0.5 tablets (20 mg total) by mouth daily  Qty: 90 tablet, Refills: 0    Associated Diagnoses: Essential hypertension      oxyCODONE (Roxicodone) 5 immediate release tablet Take 1 tablet (5 mg total) by mouth every 4 (four) hours as needed for moderate pain Max Daily Amount: 30 mg  Qty: 30 tablet, Refills: 0    Associated Diagnoses: Right leg pain      spironolactone (ALDACTONE) 25 mg tablet Take 1 tablet (25 mg total) by mouth daily  Qty: 90 tablet, Refills: 0    Associated Diagnoses: Essential hypertension; Localized edema           No discharge procedures on file.  ED SEPSIS DOCUMENTATION   Time reflects when diagnosis was documented in both MDM as applicable and the Disposition within this note       Time User Action Codes Description Comment    5/22/2025   9:05 PM Mahad Chaney Add [L03.115] Cellulitis of right lower leg                      [1]   Past Medical History:  Diagnosis Date    Hypertension    [2] No past surgical history on file.  [3]   Family History  Problem Relation Name Age of Onset    Diabetes Mother Sayda Balderrama     Hypertension Father Ash Balderrama Sr     Kidney disease Father Ash Balderrama Sr     Asthma Brother Ash Balderrama Jr    [4]   Social History  Tobacco Use    Smoking status: Never     Passive exposure: Never    Smokeless tobacco: Never   Vaping Use    Vaping status: Never Used   Substance Use Topics    Alcohol use: Not Currently    Drug use: Not Currently        Mahad Chaney MD  05/22/25 6297

## 2025-05-22 NOTE — ED NOTES
This RN unsuccessful in IV placement x2, another RN at bedside attempting IV access and to obtain rest of blood work     Hazel Vazquez, MERT  05/22/25 1916

## 2025-05-23 LAB
ANION GAP SERPL CALCULATED.3IONS-SCNC: 7 MMOL/L (ref 4–13)
BUN SERPL-MCNC: 15 MG/DL (ref 5–25)
CALCIUM SERPL-MCNC: 8.6 MG/DL (ref 8.4–10.2)
CHLORIDE SERPL-SCNC: 103 MMOL/L (ref 96–108)
CO2 SERPL-SCNC: 24 MMOL/L (ref 21–32)
CREAT SERPL-MCNC: 0.91 MG/DL (ref 0.6–1.3)
ERYTHROCYTE [DISTWIDTH] IN BLOOD BY AUTOMATED COUNT: 13.2 % (ref 11.6–15.1)
GFR SERPL CREATININE-BSD FRML MDRD: 100 ML/MIN/1.73SQ M
GLUCOSE SERPL-MCNC: 130 MG/DL (ref 65–140)
GLUCOSE SERPL-MCNC: 95 MG/DL (ref 65–140)
HCT VFR BLD AUTO: 40.2 % (ref 36.5–49.3)
HGB BLD-MCNC: 13.7 G/DL (ref 12–17)
MAGNESIUM SERPL-MCNC: 2 MG/DL (ref 1.9–2.7)
MCH RBC QN AUTO: 29.3 PG (ref 26.8–34.3)
MCHC RBC AUTO-ENTMCNC: 34.1 G/DL (ref 31.4–37.4)
MCV RBC AUTO: 86 FL (ref 82–98)
PLATELET # BLD AUTO: 182 THOUSANDS/UL (ref 149–390)
PMV BLD AUTO: 11.6 FL (ref 8.9–12.7)
POTASSIUM SERPL-SCNC: 3.7 MMOL/L (ref 3.5–5.3)
PROCALCITONIN SERPL-MCNC: 5.12 NG/ML
RBC # BLD AUTO: 4.68 MILLION/UL (ref 3.88–5.62)
SODIUM SERPL-SCNC: 134 MMOL/L (ref 135–147)
WBC # BLD AUTO: 10.7 THOUSAND/UL (ref 4.31–10.16)

## 2025-05-23 PROCEDURE — 84145 PROCALCITONIN (PCT): CPT | Performed by: NURSE PRACTITIONER

## 2025-05-23 PROCEDURE — 99232 SBSQ HOSP IP/OBS MODERATE 35: CPT | Performed by: FAMILY MEDICINE

## 2025-05-23 PROCEDURE — 85027 COMPLETE CBC AUTOMATED: CPT | Performed by: NURSE PRACTITIONER

## 2025-05-23 PROCEDURE — 82948 REAGENT STRIP/BLOOD GLUCOSE: CPT

## 2025-05-23 PROCEDURE — 80048 BASIC METABOLIC PNL TOTAL CA: CPT | Performed by: NURSE PRACTITIONER

## 2025-05-23 PROCEDURE — 83735 ASSAY OF MAGNESIUM: CPT | Performed by: NURSE PRACTITIONER

## 2025-05-23 RX ORDER — OXYCODONE HYDROCHLORIDE 5 MG/1
2.5 TABLET ORAL EVERY 6 HOURS PRN
Refills: 0 | Status: DISCONTINUED | OUTPATIENT
Start: 2025-05-23 | End: 2025-05-29 | Stop reason: HOSPADM

## 2025-05-23 RX ORDER — MORPHINE SULFATE 4 MG/ML
4 INJECTION, SOLUTION INTRAMUSCULAR; INTRAVENOUS EVERY 4 HOURS PRN
Status: DISCONTINUED | OUTPATIENT
Start: 2025-05-23 | End: 2025-05-29 | Stop reason: HOSPADM

## 2025-05-23 RX ORDER — ACETAMINOPHEN 325 MG/1
975 TABLET ORAL EVERY 8 HOURS PRN
Status: DISCONTINUED | OUTPATIENT
Start: 2025-05-23 | End: 2025-05-29 | Stop reason: HOSPADM

## 2025-05-23 RX ORDER — LISINOPRIL 20 MG/1
20 TABLET ORAL DAILY
Status: DISCONTINUED | OUTPATIENT
Start: 2025-05-23 | End: 2025-05-29 | Stop reason: HOSPADM

## 2025-05-23 RX ORDER — CEFAZOLIN SODIUM 2 G/50ML
2000 SOLUTION INTRAVENOUS EVERY 6 HOURS
Status: DISCONTINUED | OUTPATIENT
Start: 2025-05-23 | End: 2025-05-29 | Stop reason: HOSPADM

## 2025-05-23 RX ORDER — FUROSEMIDE 10 MG/ML
40 INJECTION INTRAMUSCULAR; INTRAVENOUS
Status: DISCONTINUED | OUTPATIENT
Start: 2025-05-23 | End: 2025-05-25

## 2025-05-23 RX ORDER — SPIRONOLACTONE 25 MG/1
25 TABLET ORAL DAILY
Status: DISCONTINUED | OUTPATIENT
Start: 2025-05-23 | End: 2025-05-29 | Stop reason: HOSPADM

## 2025-05-23 RX ORDER — FUROSEMIDE 20 MG/1
20 TABLET ORAL DAILY
Status: DISCONTINUED | OUTPATIENT
Start: 2025-05-23 | End: 2025-05-23

## 2025-05-23 RX ORDER — OXYCODONE HYDROCHLORIDE 5 MG/1
5 TABLET ORAL EVERY 6 HOURS PRN
Refills: 0 | Status: DISCONTINUED | OUTPATIENT
Start: 2025-05-23 | End: 2025-05-29 | Stop reason: HOSPADM

## 2025-05-23 RX ADMIN — CEFAZOLIN SODIUM 2000 MG: 2 SOLUTION INTRAVENOUS at 20:07

## 2025-05-23 RX ADMIN — OXYCODONE HYDROCHLORIDE 5 MG: 5 TABLET ORAL at 20:11

## 2025-05-23 RX ADMIN — SPIRONOLACTONE 25 MG: 25 TABLET, FILM COATED ORAL at 08:57

## 2025-05-23 RX ADMIN — ENOXAPARIN SODIUM 60 MG: 60 INJECTION SUBCUTANEOUS at 08:57

## 2025-05-23 RX ADMIN — CEFAZOLIN SODIUM 2000 MG: 2 SOLUTION INTRAVENOUS at 13:51

## 2025-05-23 RX ADMIN — FUROSEMIDE 40 MG: 10 INJECTION, SOLUTION INTRAMUSCULAR; INTRAVENOUS at 15:09

## 2025-05-23 RX ADMIN — OXYCODONE HYDROCHLORIDE 5 MG: 5 TABLET ORAL at 05:26

## 2025-05-23 RX ADMIN — CEFAZOLIN SODIUM 2000 MG: 2 SOLUTION INTRAVENOUS at 08:03

## 2025-05-23 RX ADMIN — FUROSEMIDE 20 MG: 20 TABLET ORAL at 08:57

## 2025-05-23 RX ADMIN — LISINOPRIL 20 MG: 20 TABLET ORAL at 08:57

## 2025-05-23 RX ADMIN — OXYCODONE HYDROCHLORIDE 5 MG: 5 TABLET ORAL at 12:46

## 2025-05-23 RX ADMIN — MORPHINE SULFATE 4 MG: 4 INJECTION INTRAVENOUS at 13:51

## 2025-05-23 NOTE — PLAN OF CARE
Problem: Potential for Falls  Goal: Patient will remain free of falls  Description: INTERVENTIONS:  - Educate patient/family on patient safety including physical limitations  - Instruct patient to call for assistance with activity   - Consider consulting OT/PT to assist with strengthening/mobility based on AM PAC & JH-HLM score  - Consult OT/PT to assist with strengthening/mobility   - Keep Call bell within reach  - Keep bed low and locked with side rails adjusted as appropriate  - Keep care items and personal belongings within reach  - Initiate and maintain comfort rounds  - Make Fall Risk Sign visible to staff  - Offer Toileting every 2 Hours, in advance of need  - Initiate/Maintain alarm  - Obtain necessary fall risk management equipment   - Apply yellow socks and bracelet for high fall risk patients  - Consider moving patient to room near nurses station  Outcome: Progressing     Problem: Nutrition/Hydration-ADULT  Goal: Nutrient/Hydration intake appropriate for improving, restoring or maintaining nutritional needs  Description: Monitor and assess patient's nutrition/hydration status for malnutrition. Collaborate with interdisciplinary team and initiate plan and interventions as ordered.  Monitor patient's weight and dietary intake as ordered or per policy. Utilize nutrition screening tool and intervene as necessary. Determine patient's food preferences and provide high-protein, high-caloric foods as appropriate.     INTERVENTIONS:  - Monitor oral intake, urinary output, labs, and treatment plans  - Assess nutrition and hydration status and recommend course of action  - Evaluate amount of meals eaten  - Assist patient with eating if necessary   - Allow adequate time for meals  - Recommend/ encourage appropriate diets, oral nutritional supplements, and vitamin/mineral supplements  - Order, calculate, and assess calorie counts as needed  - Recommend, monitor, and adjust tube feedings and TPN/PPN based on assessed  needs  - Assess need for intravenous fluids  - Provide specific nutrition/hydration education as appropriate  - Include patient/family/caregiver in decisions related to nutrition  Outcome: Progressing     Problem: PAIN - ADULT  Goal: Verbalizes/displays adequate comfort level or baseline comfort level  Description: Interventions:  - Encourage patient to monitor pain and request assistance  - Assess pain using appropriate pain scale  - Administer analgesics as ordered based on type and severity of pain and evaluate response  - Implement non-pharmacological measures as appropriate and evaluate response  - Consider cultural and social influences on pain and pain management  - Notify physician/advanced practitioner if interventions unsuccessful or patient reports new pain  - Educate patient/family on pain management process including their role and importance of  reporting pain   - Provide non-pharmacologic/complimentary pain relief interventions  Outcome: Progressing     Problem: INFECTION - ADULT  Goal: Absence or prevention of progression during hospitalization  Description: INTERVENTIONS:  - Assess and monitor for signs and symptoms of infection  - Monitor lab/diagnostic results  - Monitor all insertion sites, i.e. indwelling lines, tubes, and drains  - Monitor endotracheal if appropriate and nasal secretions for changes in amount and color  - [place] appropriate cooling/warming therapies per order  - Administer medications as ordered  - Instruct and encourage patient and family to use good hand hygiene technique  - Identify and instruct in appropriate isolation precautions for identified infection/condition  Outcome: Progressing  Goal: Absence of fever/infection during neutropenic period  Description: INTERVENTIONS:  - Monitor WBC  - Perform strict hand hygiene  - Limit to healthy visitors only  - No plants, dried, fresh or silk flowers with valle in patient room  Outcome: Progressing     Problem: SAFETY  ADULT  Goal: Patient will remain free of falls  Description: INTERVENTIONS:  - Educate patient/family on patient safety including physical limitations  - Instruct patient to call for assistance with activity   - Consider consulting OT/PT to assist with strengthening/mobility based on AM PAC & JH-HLM score  - Consult OT/PT to assist with strengthening/mobility   - Keep Call bell within reach  - Keep bed low and locked with side rails adjusted as appropriate  - Keep care items and personal belongings within reach  - Initiate and maintain comfort rounds  - Make Fall Risk Sign visible to staff  - Offer Toileting every 2 Hours, in advance of need  - Initiate/Maintain alarm  - Obtain necessary fall risk management equipment   - Apply yellow socks and bracelet for high fall risk patients  - Consider moving patient to room near nurses station  Outcome: Progressing  Goal: Maintain or return to baseline ADL function  Description: INTERVENTIONS:  -  Assess patient's ability to carry out ADLs; assess patient's baseline for ADL function and identify physical deficits which impact ability to perform ADLs (bathing, care of mouth/teeth, toileting, grooming, dressing, etc.)  - Assess/evaluate cause of self-care deficits   - Assess range of motion  - Assess patient's mobility; develop plan if impaired  - Assess patient's need for assistive devices and provide as appropriate  - Encourage maximum independence but intervene and supervise when necessary  - Involve family in performance of ADLs  - Assess for home care needs following discharge   - Consider OT consult to assist with ADL evaluation and planning for discharge  - Provide patient education as appropriate  - Monitor functional capacity and physical performance, use of AM PAC & JH-HLM   - Monitor gait, balance and fatigue with ambulation    Outcome: Progressing  Goal: Maintains/Returns to pre admission functional level  Description: INTERVENTIONS:  - Perform AM-PAC 6 Click Basic  Mobility/ Daily Activity assessment daily.  - Set and communicate daily mobility goal to care team and patient/family/caregiver.   - Collaborate with rehabilitation services on mobility goals if consulted  - Perform Range of Motion 3 times a day.  - Reposition patient every 23 hours.  - Dangle patient 3 times a day  - Stand patient 3 times a day  - Ambulate patient 3 times a day  - Out of bed to chair 3 times a day   - Out of bed for meals 3 times a day  - Out of bed for toileting  - Record patient progress and toleration of activity level   Outcome: Progressing     Problem: DISCHARGE PLANNING  Goal: Discharge to home or other facility with appropriate resources  Description: INTERVENTIONS:  - Identify barriers to discharge w/patient and caregiver  - Arrange for needed discharge resources and transportation as appropriate  - Identify discharge learning needs (meds, wound care, etc.)  - Arrange for interpretive services to assist at discharge as needed  - Refer to Case Management Department for coordinating discharge planning if the patient needs post-hospital services based on physician/advanced practitioner order or complex needs related to functional status, cognitive ability, or social support system  Outcome: Progressing     Problem: Knowledge Deficit  Goal: Patient/family/caregiver demonstrates understanding of disease process, treatment plan, medications, and discharge instructions  Description: Complete learning assessment and assess knowledge base.  Interventions:  - Provide teaching at level of understanding  - Provide teaching via preferred learning methods  Outcome: Progressing

## 2025-05-23 NOTE — CONSULTS
Consultation - Infectious Disease   Name: Nicolás Balderrama 45 y.o. male I MRN: [id number]  Unit/Bed#: -01 I Date of Admission: 5/22/2025   Date of Service: 5/23/2025 I Hospital Day: 1   Inpatient consult to Infectious Diseases  Consult performed by: Tiff Contreras MD  Consult ordered by: KELLI Bauman      Administrative Statements   VIRTUAL CARE DOCUMENTATION:     1. This service was provided via Telemedicine using [employer]     2. Parties in the room with patient during teleconsult Patient only    3. Confidentiality My office door was closed     4. Participants No one else was in the room    5. Patient acknowledged consent and understanding of privacy and security of the  Telemedicine consult. I informed the patient that I have reviewed their record in Epic and presented the opportunity for them to ask any questions regarding the visit today.  The patient agreed to participate.    6. I have spent a total time of 90 minutes in caring for this patient on the day of the visit/encounter including Diagnostic results, Prognosis, Instructions for management, Importance of tx compliance, Risk factor reductions, Impressions, Counseling / Coordination of care, Documenting in the medical record, Reviewing/placing orders in the medical record (including tests, medications, and/or procedures), Obtaining or reviewing history  , and Communicating with other healthcare professionals , not including the time spent for establishing the audio/video connection.       Physician Requesting Evaluation: Ashu Adler MD   Reason for Evaluation / Principal Problem: Recurrent cellulitis    Assessment & Plan  Sepsis (HCC)  Evidenced by fever at home, tachycardia, leukocytosis  Source of sepsis is recurrent right lower extremity cellulitis  Clinically improving, afebrile and hemodynamically stable without leukocytosis    Antibiotics and additional management as below   Check daily CBC, CMP while inpatient to monitor for  any evolving antibiotic toxicity or treatment failure   Continue supportive care, monitor clinical course    Cellulitis of right lower extremity  Right lower extremity cellulitis in the setting of morbid obesity, chronic venous edema and job requirements of prolonged standing/immobilization  Third hospitalization in 3 years.   Suspect strep spp. No clinical evidence of abscess.  Anticipate slow improvement due to extent of edema       Continue cefazolin, adjusted dose to 2 q6h   Follow-up blood cultures   Recommend aggressive iv diuretics as tolerated by renal function and BP   Recommend compression wrap   Continue serial extremity exam   Would recommend consideration of penicillin prophylaxis for 6-12 months until underlying risk factors can be addressed but patient currently is declining due to risk of adverse effects including c diff   As an outpatient would recommend obtaining dopplers with venous reflux to assess for any venous insufficiency and referral to vascular surgery  Discussed importance of compliance as an outpatient with lower extremity skin care, management of venous edema with oral diuretics, compression stockings, limb elevation  Recommend weight loss  Discussed natural history of cellulitis and discussed with patient that he is at risk for recurrent cellulitis/bacteremia if underlying risk factors cannot be modified    Morbid obesity (HCC)  Risk factor for recurrent infection     Strongly recommend patient follow-up with bariatric center for lifestyle modification, weight loss      Antibiotics:  cefazolin    History of Present Illness   Nicolás Balderrama is a 45 y.o. year old male with history of morbid obesity, chronic venous edema, hypertension and history of recurrent right leg cellulitis since 2019 and was hospitalized for this in May 2023 and April 2024.  He was in his usual state of health until 3 days prior to presentation when he developed acute onset fever and chills with Tmax of 102.  Shortly  after he developed increasing pain, swelling, redness of his right leg.  Symptoms were similar to his previous hospitalizations.  He was started on p.o. Keflex but without any improvement and returned to the ER on 5/22.  A complete review of systems is negative other than that noted in the HPI.    Medical History Review: I have reviewed the patient's PMH, PSH, Social History, Family History, Meds, and Allergies     Objective :  Temp:  [97.9 °F (36.6 °C)-99.6 °F (37.6 °C)] 97.9 °F (36.6 °C)  HR:  [] 98  BP: (127-180)/() 130/89  Resp:  [18-20] 18  SpO2:  [95 %-100 %] 96 %  O2 Device: None (Room air)    General:  No acute distress  Psychiatric:  Awake and alert  Pulmonary:  Normal respiratory excursion without accessory muscle use  Abdomen:  Soft, nontender  Extremities:  2+edema RLE, non pitting edema LLE  Skin:  Diffuse cicrumferential erythema of RLE without purulence or open wounds        Lab Results: I have reviewed the following results:  Results from last 7 days   Lab Units 05/23/25  0807 05/22/25  1825   WBC Thousand/uL 10.70* 13.96*   HEMOGLOBIN g/dL 13.7 15.1   PLATELETS Thousands/uL 182 203     Results from last 7 days   Lab Units 05/23/25  0537 05/22/25  1825   SODIUM mmol/L 134* 135   POTASSIUM mmol/L 3.7 3.7   CHLORIDE mmol/L 103 101   CO2 mmol/L 24 26   BUN mg/dL 15 16   CREATININE mg/dL 0.91 0.96   EGFR ml/min/1.73sq m 100 94   CALCIUM mg/dL 8.6 9.1   AST U/L  --  32   ALT U/L  --  34   ALK PHOS U/L  --  80   ALBUMIN g/dL  --  3.8     Results from last 7 days   Lab Units 05/22/25  1825   BLOOD CULTURE  Received in Microbiology Lab. Culture in Progress.     Results from last 7 days   Lab Units 05/23/25  0537 05/22/25  1825   PROCALCITONIN ng/ml 5.12* 6.78*

## 2025-05-23 NOTE — ASSESSMENT & PLAN NOTE
Risk factor for recurrent infection     Strongly recommend patient follow-up with bariatric center for lifestyle modification, weight loss

## 2025-05-23 NOTE — ASSESSMENT & PLAN NOTE
Meeting criteria with tachycardia, leukocytosis  Normal lactic acid  Right lower extremity cellulitis source  Blood cultures pending  Empiric high-dose Ancef  Trend fever curve, leukocytosis

## 2025-05-23 NOTE — H&P
H&P - Hospitalist   Name: Nicolás Balderrama 45 y.o. male I MRN: [id number]  Unit/Bed#: -01 I Date of Admission: 5/22/2025   Date of Service: 5/23/2025 I Hospital Day: 1     Assessment & Plan  Cellulitis of right lower extremity  Presents with 2 days of fever, flulike symptoms and right lower extremity erythema.  Rx'd Keflex without improvement  History of recurrent right lower extremity cellulitis with previous hospitalizations and successful treatment with Ancef  Works as a , no history of MRSA  We will check MRSA culture swab for completeness  Trend fever curve and leukocytosis  Procalcitonin elevated  Blood cultures pending  X-ray right lower send ID without obvious gas formation  If no improvement over next 48 hours consider CT lower extremity   Appreciate infectious disease consultation due to recurrent severe cellulitis      Morbid obesity (HCC)  BMI 53  Morbid obesity complicating all aspects of healthcare  Requires intensive lifestyle modification   Sepsis (HCC)  Meeting criteria with tachycardia, leukocytosis  Normal lactic acid  Right lower extremity cellulitis source  Blood cultures pending  Empiric high-dose Ancef  Trend fever curve, leukocytosis  Essential hypertension  Continue PTA lisinopril, furosemide, spironolactone  Trend blood pressures      VTE Pharmacologic Prophylaxis:   High Risk (Score >/= 5) - Pharmacological DVT Prophylaxis Ordered: enoxaparin (Lovenox). Sequential Compression Devices Ordered.  Code Status: Level 1 - Full Code   Discussion with family: Patient declined call to .     Anticipated Length of Stay: Patient will be admitted on an inpatient basis with an anticipated length of stay of greater than 2 midnights secondary to cellulitis.    History of Present Illness   Chief Complaint: Right lower extremity cellulitis    Nicolás Balderrama is a 45 y.o. male with a PMH of HTN, obesity, lower extremity edema treated with furosemide and spironolactone,  recurrent lower extremity cellulitis who presents with 2 days of fevers and flulike symptoms along with right lower extremity erythema.  Found to have right lower 70 cellulitis meeting sepsis criteria and initiated on Ancef.  Works as a , will rule out MRSA.  X-ray without obvious gas formation.  Complaining of pain in right lower extremity.    Review of Systems   Constitutional:  Positive for chills and fever.   HENT:  Negative for ear pain and sore throat.    Eyes:  Negative for pain and visual disturbance.   Respiratory:  Negative for cough and shortness of breath.    Cardiovascular:  Positive for leg swelling. Negative for chest pain and palpitations.   Gastrointestinal:  Negative for abdominal pain and vomiting.   Genitourinary:  Negative for dysuria and hematuria.   Musculoskeletal:  Positive for arthralgias and myalgias. Negative for back pain.   Skin:  Positive for rash. Negative for color change.   Neurological:  Negative for seizures and syncope.   All other systems reviewed and are negative.      Historical Information   Past Medical History[1]  Past Surgical History[2]  Social History[3]  E-Cigarette/Vaping    E-Cigarette Use Never User      E-Cigarette/Vaping Substances    Nicotine No     THC No     CBD No     Flavoring No     Other No     Unknown No      Family History[4]  Social History:  Marital Status: Single   Occupation:   Patient Pre-hospital Living Situation: Home  Patient Pre-hospital Level of Mobility: walks  Patient Pre-hospital Diet Restrictions: None    Meds/Allergies   I have reviewed home medications with patient personally.  Prior to Admission medications    Medication Sig Start Date End Date Taking? Authorizing Provider   furosemide (LASIX) 20 mg tablet Take 1 tablet (20 mg total) by mouth daily 3/17/25  Yes KELLI Palacios   lisinopril (ZESTRIL) 40 mg tablet Take 0.5 tablets (20 mg total) by mouth daily  Patient taking differently: Take  40 mg by mouth in the morning. 3/17/25  Yes Marquise Brar MD   spironolactone (ALDACTONE) 25 mg tablet Take 1 tablet (25 mg total) by mouth daily 3/17/25  Yes Marquise Brar MD   clotrimazole (LOTRIMIN) 1 % cream Apply topically 2 (two) times a day 2/26/24 4/21/24  KELLI Palacios   oxyCODONE (Roxicodone) 5 immediate release tablet Take 1 tablet (5 mg total) by mouth every 4 (four) hours as needed for moderate pain Max Daily Amount: 30 mg  Patient not taking: Reported on 5/22/2025 3/24/25   KELLI Palacios     No Known Allergies    Objective :  Temp:  [98.1 °F (36.7 °C)-99.6 °F (37.6 °C)] 98.1 °F (36.7 °C)  HR:  [100-114] 100  BP: (127-180)/() 127/89  Resp:  [18-20] 20  SpO2:  [95 %-100 %] 95 %  O2 Device: None (Room air)    Physical Exam  Vitals and nursing note reviewed.   Constitutional:       General: He is not in acute distress.     Appearance: He is well-developed. He is obese. He is ill-appearing.   HENT:      Head: Normocephalic and atraumatic.     Eyes:      Conjunctiva/sclera: Conjunctivae normal.       Cardiovascular:      Rate and Rhythm: Normal rate and regular rhythm.      Heart sounds: No murmur heard.  Pulmonary:      Effort: Pulmonary effort is normal. No respiratory distress.      Breath sounds: Normal breath sounds.   Abdominal:      Palpations: Abdomen is soft.      Tenderness: There is no abdominal tenderness.     Musculoskeletal:         General: No swelling.      Cervical back: Neck supple.     Skin:     General: Skin is warm and dry.      Capillary Refill: Capillary refill takes less than 2 seconds.      Findings: Erythema present.      Comments: Circumferential erythema right lower extremity     Neurological:      General: No focal deficit present.      Mental Status: He is alert and oriented to person, place, and time.     Psychiatric:         Mood and Affect: Mood normal.         Behavior: Behavior normal.          Lines/Drains:            Lab Results: I have  reviewed the following results:  Results from last 7 days   Lab Units 05/22/25  1825   WBC Thousand/uL 13.96*   HEMOGLOBIN g/dL 15.1   HEMATOCRIT % 45.7   PLATELETS Thousands/uL 203   SEGS PCT % 87*   LYMPHO PCT % 8*   MONO PCT % 4   EOS PCT % 0     Results from last 7 days   Lab Units 05/22/25  1825   SODIUM mmol/L 135   POTASSIUM mmol/L 3.7   CHLORIDE mmol/L 101   CO2 mmol/L 26   BUN mg/dL 16   CREATININE mg/dL 0.96   ANION GAP mmol/L 8   CALCIUM mg/dL 9.1   ALBUMIN g/dL 3.8   TOTAL BILIRUBIN mg/dL 1.69*   ALK PHOS U/L 80   ALT U/L 34   AST U/L 32   GLUCOSE RANDOM mg/dL 96             Lab Results   Component Value Date    HGBA1C 5.3 08/12/2020    HGBA1C 5.1 01/06/2020     Results from last 7 days   Lab Units 05/22/25  1825   LACTIC ACID mmol/L 1.2   PROCALCITONIN ng/ml 6.78*       Imaging Results Review: I reviewed radiology reports from this admission including: xray(s).  Other Study Results Review: No additional pertinent studies reviewed.      ** Please Note: This note has been constructed using a voice recognition system. **         [1]   Past Medical History:  Diagnosis Date    Hypertension    [2] No past surgical history on file.  [3]   Social History  Tobacco Use    Smoking status: Never     Passive exposure: Never    Smokeless tobacco: Never   Vaping Use    Vaping status: Never Used   Substance and Sexual Activity    Alcohol use: Not Currently    Drug use: Not Currently    Sexual activity: Yes     Partners: Female     Birth control/protection: None   [4]   Family History  Problem Relation Name Age of Onset    Diabetes Mother Sayda Balderrama     Hypertension Father Ash Balderrama Sr     Kidney disease Father Ash Balderrama Sr     Asthma Brother Ash Balderrama Jr

## 2025-05-23 NOTE — UTILIZATION REVIEW
Initial Clinical Review    Admission: Date/Time/Statement:   Admission Orders (From admission, onward)       Ordered        05/22/25 2106  INPATIENT ADMISSION  Once                          Orders Placed This Encounter   Procedures    INPATIENT ADMISSION     Standing Status:   Standing     Number of Occurrences:   1     Level of Care:   Med Surg [16]     Estimated length of stay:   More than 2 Midnights     Certification:   I certify that inpatient services are medically necessary for this patient for a duration of greater than two midnights. See H&P and MD Progress Notes for additional information about the patient's course of treatment.     ED Arrival Information       Expected   -    Arrival   5/22/2025 15:48    Acuity   Urgent              Means of arrival   Walk-In    Escorted by   Family Member    Service   Hospitalist    Admission type   Emergency              Arrival complaint   wound check             Chief Complaint   Patient presents with    Cellulitis     Right leg, patient reports starting with a fever Tuesday night and then starting with redness and swelling.       Initial Presentation: 45 y.o. male presents to ED from home  with fevers, flu like symptoms and  right lower extremity erythema for past 2 days.  Met sepsis criteria in ED, found with right lower extremity cellulitis.   X ray w/o gas formation.  PMH is HTN, recurrent LE  cellulitis  and edema treated with lasix and aldactone.  Admit  IP with Cellulitis  Right lower extremity, Sepsis  and plan is  SHANE, blood cultures, ID consult   and monitor  temps.        Anticipated Length of Stay/Certification Statement: Patient will be admitted on an inpatient basis with an anticipated length of stay of greater than 2 midnights secondary to cellulitis.     Date:   5/23   Day 2:   ID  consult  Continue on  IV  ancef, dose adjusted. F/U blood cultures.   Need serial extremity exams and recommend compression wrap.   Now  3rd  admission in  past  3 years for  same problem.  No clinical evidence  of  abscess.    Slow improvement     due to extent of edema.  Consider long term  po PCN.   2+ edema   RLE     non pitting edema  LLE.       ED Treatment-Medication Administration from 05/22/2025 1545 to 05/22/2025 2216         Date/Time Order Dose Route Action     05/22/2025 1942 ceFAZolin (ANCEF) IVPB (premix in dextrose) 2,000 mg 50 mL 2,000 mg Intravenous New Bag     05/22/2025 1942 ketorolac (TORADOL) injection 15 mg 15 mg Intravenous Given     05/22/2025 1942 sodium chloride 0.9 % bolus 1,000 mL 1,000 mL Intravenous New Bag     05/22/2025 2040 ketorolac (TORADOL) injection 15 mg 15 mg Intravenous Given            Scheduled Medications:  cefazolin, 2,000 mg, Intravenous, Q8H  enoxaparin, 60 mg, Subcutaneous, BID  furosemide, 20 mg, Oral, Daily  lisinopril, 20 mg, Oral, Daily  spironolactone, 25 mg, Oral, Daily      Continuous IV Infusions:     PRN Meds:  acetaminophen, 975 mg, Oral, Q8H PRN  morphine injection, 4 mg, Intravenous, Q4H PRN  oxyCODONE, 2.5 mg, Oral, Q6H PRN  oxyCODONE, 5 mg, Oral, Q6H PRN      ED Triage Vitals [05/22/25 1636]   Temperature Pulse Respirations Blood Pressure SpO2 Pain Score   99.6 °F (37.6 °C) (!) 114 18 (!) 180/118 99 % 10 - Worst Possible Pain     Weight (last 2 days)       Date/Time Weight    05/22/25 2225 163 (360)    05/22/25 1636 163 (360)            Vital Signs (last 3 days)       Date/Time Temp Pulse Resp BP MAP (mmHg) SpO2 O2 Device Patient Position - Orthostatic VS Julianne Coma Scale Score Pain    05/23/25 07:38:11 97.9 °F (36.6 °C) 98 18 130/89 103 96 % -- -- -- --    05/23/25 0526 -- -- -- -- -- -- -- -- -- 8    05/22/25 2254 -- -- -- -- -- 95 % None (Room air) -- 15 --    05/22/25 2245 -- -- -- -- -- -- -- -- -- 8    05/22/25 2225 98.1 °F (36.7 °C) 100 20 127/89 102 100 % -- -- -- --    05/22/25 22:22:48 98.1 °F (36.7 °C) 100 18 127/89 102 96 % -- -- -- --    05/22/25 2222 98.1 °F (36.7 °C) 100 20 127/89 -- 100 % -- -- -- --     05/22/25 2100 -- 103 -- 149/86 107 96 % -- -- -- --    05/22/25 2047 -- 114 18 141/98 113 98 % None (Room air) Lying -- --    05/22/25 2040 -- -- -- -- -- -- -- -- -- 8    05/22/25 1942 -- -- -- -- -- -- -- -- -- 10 - Worst Possible Pain    05/22/25 1940 -- -- -- -- -- -- -- -- 15 --    05/22/25 1636 99.6 °F (37.6 °C) 114 18 180/118 -- 99 % None (Room air) Sitting -- 10 - Worst Possible Pain              Pertinent Labs/Diagnostic Test Results:   Radiology:  XR tibia fibula 2 views RIGHT   ED Interpretation by Mahad Chaney MD (05/22 2025)   No subcutaneous air or acute osseous abnormality appreciated.      Final Interpretation by Natanael Jain MD (05/23 0645)      No acute osseous abnormality.            Computerized Assisted Algorithm (CAA) may have been used to analyze all applicable images.               Workstation performed: [id number]           Cardiology:    GI:      Results from last 7 days   Lab Units 05/23/25  0807 05/22/25  1825   WBC Thousand/uL 10.70* 13.96*   HEMOGLOBIN g/dL 13.7 15.1   HEMATOCRIT % 40.2 45.7   PLATELETS Thousands/uL 182 203   TOTAL NEUT ABS Thousands/µL  --  12.17*         Results from last 7 days   Lab Units 05/23/25  0537 05/22/25  1825   SODIUM mmol/L 134* 135   POTASSIUM mmol/L 3.7 3.7   CHLORIDE mmol/L 103 101   CO2 mmol/L 24 26   ANION GAP mmol/L 7 8   BUN mg/dL 15 16   CREATININE mg/dL 0.91 0.96   EGFR ml/min/1.73sq m 100 94   CALCIUM mg/dL 8.6 9.1   MAGNESIUM mg/dL 2.0  --      Results from last 7 days   Lab Units 05/22/25  1825   AST U/L 32   ALT U/L 34   ALK PHOS U/L 80   TOTAL PROTEIN g/dL 7.3   ALBUMIN g/dL 3.8   TOTAL BILIRUBIN mg/dL 1.69*     Results from last 7 days   Lab Units 05/23/25  0741   POC GLUCOSE mg/dl 130     Results from last 7 days   Lab Units 05/23/25  0537 05/22/25  1825   GLUCOSE RANDOM mg/dL 95 96               Results from last 7 days   Lab Units 05/23/25  0537 05/22/25  1825   PROCALCITONIN ng/ml 5.12* 6.78*     Results from last 7 days    Lab Units 05/22/25  1825   LACTIC ACID mmol/L 1.2           Results from last 7 days   Lab Units 05/22/25  1825   BLOOD CULTURE  Received in Microbiology Lab. Culture in Progress.                   Past Medical History[1]  Present on Admission:   Morbid obesity (HCC)   Essential hypertension   Cellulitis of right lower extremity   Sepsis (HCC)      Admitting Diagnosis: Cellulitis [L03.90]  Cellulitis of right lower leg [L03.115]  Age/Sex: 45 y.o. male    Network Utilization Review Department  ATTENTION: Please call with any questions or concerns to [phone] and carefully listen to the prompts so that you are directed to the right person. All voicemails are confidential.   For Discharge needs, contact Care Management DC Support Team at [phone] opt. 2  Send all requests for admission clinical reviews, approved or denied determinations and any other requests to dedicated fax number below belonging to the campus where the patient is receiving treatment. List of dedicated fax numbers for the Facilities:  FACILITY NAME UR FAX NUMBER   ADMISSION DENIALS (Administrative/Medical Necessity) [phone]   DISCHARGE SUPPORT TEAM (NETWORK) [phone]   PARENT CHILD HEALTH (Maternity/NICU/Pediatrics) [phone]   [facility] [phone]   [facility] [phone]   [facility] [phone]   [facility] [phone]   [facility] [phone]   [facility] [phone]   [facility] [phone]   [facility] [phone]   [facility] [phone]   [facility] [phone]   [facility] [phone]   [facility] [phone]               [1]   Past Medical History:  Diagnosis Date     Hypertension

## 2025-05-23 NOTE — ASSESSMENT & PLAN NOTE
Evidenced by fever at home, tachycardia, leukocytosis  Source of sepsis is recurrent right lower extremity cellulitis  Clinically improving, afebrile and hemodynamically stable without leukocytosis    Antibiotics and additional management as below   Check daily CBC, CMP while inpatient to monitor for any evolving antibiotic toxicity or treatment failure   Continue supportive care, monitor clinical course

## 2025-05-23 NOTE — CASE MANAGEMENT
Case Management Assessment & Discharge Planning Note    Patient name Nicolás Balderrama  Location /-01 MRN [id number]  : 1980 Date 2025       Current Admission Date: 2025  Current Admission Diagnosis:Sepsis (HCC)   Patient Active Problem List    Diagnosis Date Noted    Essential hypertension 2024    Right heart enlargement 2024    Abnormal echocardiogram 2024    Localized edema 2023    Leukocytosis 2023    BMI 50.0-59.9, adult (HCC) 2023    Cellulitis of right lower extremity 2019    Hypokalemia 2019    Morbid obesity (HCC) 2019    Sepsis (HCC) 2019      LOS (days): 1  Geometric Mean LOS (GMLOS) (days): 3.5  Days to GMLOS:3     OBJECTIVE:    Risk of Unplanned Readmission Score: 5.59         Current admission status: Inpatient  Referral Reason: VNA, Other (possible IV abx)    Preferred Pharmacy:   [id number] [address] [address]  Phone: [phone] Fax: [phone]    Primary Care Provider: KELLI Palacios    Primary Insurance: [facility]  Secondary Insurance:     ASSESSMENT:  Active Health Care Proxies    There are no active Health Care Proxies on file.       Advance Directives  Does patient have a Health Care POA?: No  Was patient offered paperwork?: Yes  Does patient currently have a Health Care decision maker?: Yes, please see Health Care Proxy section  Does patient have Advance Directives?: No  Was patient offered paperwork?: Yes  Primary Contact: Spouse Janell         Readmission Root Cause  30 Day Readmission: No    Patient Information  Admitted from:: Home  Mental Status: Alert  During Assessment patient was accompanied by: Not accompanied during assessment  Assessment information provided by:: Patient  Primary Caregiver: Self  Support Systems: Spouse/significant other, Parent, Children, Family members  County of Residence:  [place]  What [facility] do you live in?: [place]  Home entry access options. Select all that apply.: Stairs  Number of steps to enter home.: One Flight  Do the steps have railings?: Yes  Type of Current Residence: 3 story home  Upon entering residence, is there a bedroom on the main floor (no further steps)?: No  A bedroom is located on the following floor levels of residence (select all that apply):: 2nd Floor  Upon entering residence, is there a bathroom on the main floor (no further steps)?: No  Indicate which floors of current residence have a bathroom (select all the apply):: 2nd Floor  Number of steps to 2nd floor from main floor: One Flight  Living Arrangements: Lives w/ Spouse/significant other, Lives w/ Children  Is patient a ?: No    Activities of Daily Living Prior to Admission  Functional Status: Independent  Completes ADLs independently?: Yes  Ambulates independently?: Yes  Does patient use assisted devices?: No  Does patient currently own DME?: No  Does patient have a history of Outpatient Therapy (PT/OT)?: No  Does the patient have a history of Short-Term Rehab?: No  Does patient have a history of HHC?: No  Does patient currently have HHC?: No         Patient Information Continued  Income Source: Employed (long[place]term - [place]  Does patient have prescription coverage?: Yes  Can the patient afford their medications and any related supplies (such as glucometers or test strips)?: Yes  Does patient receive dialysis treatments?: No  Does patient have a history of substance abuse?: No  Does patient have a history of Mental Health Diagnosis?: No         Means of Transportation  Means of Transport to [facility]:: Drives Self          DISCHARGE DETAILS:    Discharge planning discussed with:: Patient  Freedom of Choice: Yes  Comments - Freedom of Choice: No needs anticipated - if needs IV abx okay with [facility] and [facility]  CM contacted family/caregiver?: No- see comments (declined)  Were Treatment Team  discharge recommendations reviewed with patient/caregiver?: Yes  Did patient/caregiver verbalize understanding of patient care needs?: Yes  Were patient/caregiver advised of the risks associated with not following Treatment Team discharge recommendations?: Yes         Requested Home Health Care         Is the patient interested in HHC at discharge?: No    DME Referral Provided  Referral made for DME?: No    Other Referral/Resources/Interventions Provided:  Interventions: None Indicated    Would you like to participate in our [facility] service program?  : No - Declined    Treatment Team Recommendation: Home  Discharge Destination Plan:: Home  Transport at Discharge : Family       CM met with patient at the bedside,baseline information  was obtained. CM discussed the role of CM in helping the patient develop a discharge plan and assist the patient in carry out their plan.  Patient was IPTA, uses no DME at baseline for ambulation, and has no hx of rehab/therapy.   Patient works [employer]. All prescriptions are usually $0 copay.   If required IV abx - okay with [facility] and [facility] for infusion. Educated on alternative options and hopeful for PO options.   CM discussed discharge planning - at this time patient has no anticipated CM needs.     CM to follow patient's care and discharge needs.

## 2025-05-23 NOTE — ASSESSMENT & PLAN NOTE
Meeting criteria with tachycardia, leukocytosis with source of right lower extremity cellulitis source (recurrent in nature)  Blood cultures pending  Empiric high-dose Ancef  Trend fever curve, leukocytosis

## 2025-05-23 NOTE — ASSESSMENT & PLAN NOTE
Presents with 2 days of fever, flulike symptoms and right lower extremity erythema.  Rx'd Keflex without improvement  History of recurrent right lower extremity cellulitis with previous hospitalizations and successful treatment with Ancef  Works as a , no history of MRSA  We will check MRSA culture swab for completeness  Trend fever curve and leukocytosis  Procalcitonin elevated  Blood cultures pending  X-ray right lower send ID without obvious gas formation  If no improvement over next 48 hours consider CT lower extremity   Appreciate infectious disease consultation due to recurrent severe cellulitis

## 2025-05-23 NOTE — ASSESSMENT & PLAN NOTE
Presents with 2 days of fever, flulike symptoms and right lower extremity erythema.  Rx'd Keflex without improvement  History of recurrent right lower extremity cellulitis with previous hospitalizations and successful treatment with Ancef  Works as a , no history of MRSA  We will check MRSA culture swab for completeness  Trend fever curve and leukocytosis  Procalcitonin elevated  Blood cultures pending  X-ray right lower :without obvious gas formation  If no improvement over next 48 hours consider CT lower extremity   Appreciate infectious disease consultation due to recurrent severe cellulitis  Also continue IV diuretics to facilitate the treatment

## 2025-05-23 NOTE — PLAN OF CARE
Problem: Potential for Falls  Goal: Patient will remain free of falls  Description: INTERVENTIONS:  - Educate patient/family on patient safety including physical limitations  - Instruct patient to call for assistance with activity   - Consider consulting OT/PT to assist with strengthening/mobility based on AM PAC & JH-HLM score  - Consult OT/PT to assist with strengthening/mobility   - Keep Call bell within reach  - Keep bed low and locked with side rails adjusted as appropriate  - Keep care items and personal belongings within reach  - Initiate and maintain comfort rounds  - Make Fall Risk Sign visible to staff  - Offer Toileting every    Hours, in advance of need  - Initiate/Maintain   alarm  - Obtain necessary fall risk management equipment:     - Apply yellow socks and bracelet for high fall risk patients  - Consider moving patient to room near nurses station  Outcome: Progressing     Problem: Nutrition/Hydration-ADULT  Goal: Nutrient/Hydration intake appropriate for improving, restoring or maintaining nutritional needs  Description: Monitor and assess patient's nutrition/hydration status for malnutrition. Collaborate with interdisciplinary team and initiate plan and interventions as ordered.  Monitor patient's weight and dietary intake as ordered or per policy. Utilize nutrition screening tool and intervene as necessary. Determine patient's food preferences and provide high-protein, high-caloric foods as appropriate.     INTERVENTIONS:  - Monitor oral intake, urinary output, labs, and treatment plans  - Assess nutrition and hydration status and recommend course of action  - Evaluate amount of meals eaten  - Assist patient with eating if necessary   - Allow adequate time for meals  - Recommend/ encourage appropriate diets, oral nutritional supplements, and vitamin/mineral supplements  - Order, calculate, and assess calorie counts as needed  - Recommend, monitor, and adjust tube feedings and TPN/PPN based on  assessed needs  - Assess need for intravenous fluids  - Provide specific nutrition/hydration education as appropriate  - Include patient/family/caregiver in decisions related to nutrition  Outcome: Progressing     Problem: PAIN - ADULT  Goal: Verbalizes/displays adequate comfort level or baseline comfort level  Description: Interventions:  - Encourage patient to monitor pain and request assistance  - Assess pain using appropriate pain scale  - Administer analgesics as ordered based on type and severity of pain and evaluate response  - Implement non-pharmacological measures as appropriate and evaluate response  - Consider cultural and social influences on pain and pain management  - Notify physician/advanced practitioner if interventions unsuccessful or patient reports new pain  - Educate patient/family on pain management process including their role and importance of  reporting pain   - Provide non-pharmacologic/complimentary pain relief interventions  Outcome: Progressing     Problem: INFECTION - ADULT  Goal: Absence or prevention of progression during hospitalization  Description: INTERVENTIONS:  - Assess and monitor for signs and symptoms of infection  - Monitor lab/diagnostic results  - Monitor all insertion sites, i.e. indwelling lines, tubes, and drains  - Monitor endotracheal if appropriate and nasal secretions for changes in amount and color  - [place] appropriate cooling/warming therapies per order  - Administer medications as ordered  - Instruct and encourage patient and family to use good hand hygiene technique  - Identify and instruct in appropriate isolation precautions for identified infection/condition  Outcome: Progressing  Goal: Absence of fever/infection during neutropenic period  Description: INTERVENTIONS:  - Monitor WBC  - Perform strict hand hygiene  - Limit to healthy visitors only  - No plants, dried, fresh or silk flowers with valle in patient room  Outcome: Progressing     Problem: SAFETY  ADULT  Goal: Patient will remain free of falls  Description: INTERVENTIONS:  - Educate patient/family on patient safety including physical limitations  - Instruct patient to call for assistance with activity   - Consider consulting OT/PT to assist with strengthening/mobility based on AM PAC & JH-HLM score  - Consult OT/PT to assist with strengthening/mobility   - Keep Call bell within reach  - Keep bed low and locked with side rails adjusted as appropriate  - Keep care items and personal belongings within reach  - Initiate and maintain comfort rounds  - Make Fall Risk Sign visible to staff  - Offer Toileting every    Hours, in advance of need  - Initiate/Maintain   alarm  - Obtain necessary fall risk management equipment:     - Apply yellow socks and bracelet for high fall risk patients  - Consider moving patient to room near nurses station  Outcome: Progressing  Goal: Maintain or return to baseline ADL function  Description: INTERVENTIONS:  -  Assess patient's ability to carry out ADLs; assess patient's baseline for ADL function and identify physical deficits which impact ability to perform ADLs (bathing, care of mouth/teeth, toileting, grooming, dressing, etc.)  - Assess/evaluate cause of self-care deficits   - Assess range of motion  - Assess patient's mobility; develop plan if impaired  - Assess patient's need for assistive devices and provide as appropriate  - Encourage maximum independence but intervene and supervise when necessary  - Involve family in performance of ADLs  - Assess for home care needs following discharge   - Consider OT consult to assist with ADL evaluation and planning for discharge  - Provide patient education as appropriate  - Monitor functional capacity and physical performance, use of AM PAC & JH-HLM   - Monitor gait, balance and fatigue with ambulation    Outcome: Progressing  Goal: Maintains/Returns to pre admission functional level  Description: INTERVENTIONS:  - Perform AM-PAC 6 Click  Basic Mobility/ Daily Activity assessment daily.  - Set and communicate daily mobility goal to care team and patient/family/caregiver.   - Collaborate with rehabilitation services on mobility goals if consulted  - Perform Range of Motion    times a day.  - Reposition patient every    hours.  - Dangle patient    times a day  - Stand patient    times a day  - Ambulate patient    times a day  - Out of bed to chair    times a day   - Out of bed for meals        times a day  - Out of bed for toileting  - Record patient progress and toleration of activity level   Outcome: Progressing     Problem: DISCHARGE PLANNING  Goal: Discharge to home or other facility with appropriate resources  Description: INTERVENTIONS:  - Identify barriers to discharge w/patient and caregiver  - Arrange for needed discharge resources and transportation as appropriate  - Identify discharge learning needs (meds, wound care, etc.)  - Arrange for interpretive services to assist at discharge as needed  - Refer to Case Management Department for coordinating discharge planning if the patient needs post-hospital services based on physician/advanced practitioner order or complex needs related to functional status, cognitive ability, or social support system  Outcome: Progressing     Problem: Knowledge Deficit  Goal: Patient/family/caregiver demonstrates understanding of disease process, treatment plan, medications, and discharge instructions  Description: Complete learning assessment and assess knowledge base.  Interventions:  - Provide teaching at level of understanding  - Provide teaching via preferred learning methods  Outcome: Progressing

## 2025-05-23 NOTE — ASSESSMENT & PLAN NOTE
Right lower extremity cellulitis in the setting of morbid obesity, chronic venous edema and job requirements of prolonged standing/immobilization  Third hospitalization in 3 years.   Suspect strep spp. No clinical evidence of abscess.  Anticipate slow improvement due to extent of edema       Continue cefazolin, adjusted dose to 2 q6h   Follow-up blood cultures   Recommend aggressive iv diuretics as tolerated by renal function and BP   Recommend compression wrap   Continue serial extremity exam   Would recommend consideration of penicillin prophylaxis for 6-12 months until underlying risk factors can be addressed but patient currently is declining due to risk of adverse effects including c diff   As an outpatient would recommend obtaining dopplers with venous reflux to assess for any venous insufficiency and referral to vascular surgery  Discussed importance of compliance as an outpatient with lower extremity skin care, management of venous edema with oral diuretics, compression stockings, limb elevation  Recommend weight loss  Discussed natural history of cellulitis and discussed with patient that he is at risk for recurrent cellulitis/bacteremia if underlying risk factors cannot be modified

## 2025-05-23 NOTE — ASSESSMENT & PLAN NOTE
BMI 53  Morbid obesity complicating all aspects of healthcare  Requires intensive lifestyle modification

## 2025-05-23 NOTE — PROGRESS NOTES
Progress Note - Hospitalist   Name: Nicolás Balderrama 45 y.o. male I MRN: [id number]  Unit/Bed#: -01 I Date of Admission: 5/22/2025   Date of Service: 5/23/2025 I Hospital Day: 1    Assessment & Plan  Sepsis (HCC)  Meeting criteria with tachycardia, leukocytosis with source of right lower extremity cellulitis source (recurrent in nature)  Blood cultures pending  Empiric high-dose Ancef  Trend fever curve, leukocytosis  Cellulitis of right lower extremity  Presents with 2 days of fever, flulike symptoms and right lower extremity erythema.  Rx'd Keflex without improvement  History of recurrent right lower extremity cellulitis with previous hospitalizations and successful treatment with Ancef  Works as a , no history of MRSA  We will check MRSA culture swab for completeness  Trend fever curve and leukocytosis  Procalcitonin elevated  Blood cultures pending  X-ray right lower :without obvious gas formation  If no improvement over next 48 hours consider CT lower extremity   Appreciate infectious disease consultation due to recurrent severe cellulitis  Also continue IV diuretics to facilitate the treatment      Morbid obesity (HCC)  BMI 53  Morbid obesity complicating all aspects of healthcare  Requires intensive lifestyle modification   Essential hypertension  Continue PTA lisinopril, furosemide, spironolactone  Trend blood pressures    VTE Pharmacologic Prophylaxis:   Moderate Risk (Score 3-4) - Pharmacological DVT Prophylaxis Ordered: enoxaparin (Lovenox).    Mobility:   Basic Mobility Inpatient Raw Score: 24  JH-HLM Goal: 8: Walk 250 feet or more  JH-HLM Achieved: 2: Bed activities/Dependent transfer  JH-HLM Goal NOT achieved. Continue with multidisciplinary rounding and encourage appropriate mobility to improve upon JH-HLM goals.    Patient Centered Rounds: I performed bedside rounds with nursing staff today.   Discussions with Specialists or Other Care Team Provider: Infectious  disease    Education and Discussions with Family / Patient: With patient.     Current Length of Stay: 1 day(s)  Current Patient Status: Inpatient   Certification Statement: The patient will continue to require additional inpatient hospital stay due to sepsis secondary to recurrent lower extremity cellulitis, requiring IV antibiotic  Discharge Plan: Anticipate discharge in 48-72 hrs to home.    Code Status: Level 1 - Full Code    Subjective   Seen and evaluated during the rounds resting comfortably.  Still complaining of swelling and redness of lower extremity.    Objective :  Temp:  [97.9 °F (36.6 °C)-99.6 °F (37.6 °C)] 97.9 °F (36.6 °C)  HR:  [] 98  BP: (127-180)/() 130/89  Resp:  [18-20] 18  SpO2:  [95 %-100 %] 96 %  O2 Device: None (Room air)    Body mass index is 53.16 kg/m².     Input and Output Summary (last 24 hours):     Intake/Output Summary (Last 24 hours) at 5/23/2025 0855  Last data filed at 5/23/2025 0808  Gross per 24 hour   Intake 2170 ml   Output --   Net 2170 ml       Physical Exam  Vitals and nursing note reviewed.   Constitutional:       Appearance: Normal appearance. He is obese. He is not ill-appearing or diaphoretic.     Eyes:      Extraocular Movements: Extraocular movements intact.      Conjunctiva/sclera: Conjunctivae normal.      Pupils: Pupils are equal, round, and reactive to light.       Cardiovascular:      Heart sounds: No murmur heard.     No friction rub. No gallop.   Pulmonary:      Effort: Pulmonary effort is normal. No respiratory distress.      Breath sounds: No stridor. No wheezing or rhonchi.     Musculoskeletal:         General: Tenderness (right lower extremity) present.     Skin:     Findings: Erythema (right lower extremity) present.     Neurological:      Mental Status: He is alert and oriented to person, place, and time.      Cranial Nerves: No cranial nerve deficit.      Sensory: No sensory deficit.      Motor: No weakness.      Coordination: Coordination  normal.         Lines/Drains:              Lab Results: I have reviewed the following results:   Results from last 7 days   Lab Units 05/23/25  0807 05/22/25  1825   WBC Thousand/uL 10.70* 13.96*   HEMOGLOBIN g/dL 13.7 15.1   HEMATOCRIT % 40.2 45.7   PLATELETS Thousands/uL 182 203   SEGS PCT %  --  87*   LYMPHO PCT %  --  8*   MONO PCT %  --  4   EOS PCT %  --  0     Results from last 7 days   Lab Units 05/23/25  0537 05/22/25  1825   SODIUM mmol/L 134* 135   POTASSIUM mmol/L 3.7 3.7   CHLORIDE mmol/L 103 101   CO2 mmol/L 24 26   BUN mg/dL 15 16   CREATININE mg/dL 0.91 0.96   ANION GAP mmol/L 7 8   CALCIUM mg/dL 8.6 9.1   ALBUMIN g/dL  --  3.8   TOTAL BILIRUBIN mg/dL  --  1.69*   ALK PHOS U/L  --  80   ALT U/L  --  34   AST U/L  --  32   GLUCOSE RANDOM mg/dL 95 96         Results from last 7 days   Lab Units 05/23/25  0741   POC GLUCOSE mg/dl 130         Results from last 7 days   Lab Units 05/23/25  0537 05/22/25  1825   LACTIC ACID mmol/L  --  1.2   PROCALCITONIN ng/ml 5.12* 6.78*       Recent Cultures (last 7 days):   Results from last 7 days   Lab Units 05/22/25  1825   BLOOD CULTURE  Received in Microbiology Lab. Culture in Progress.       Imaging Results Review: No pertinent imaging studies reviewed.  Other Study Results Review: No additional pertinent studies reviewed.    Last 24 Hours Medication List:     Current Facility-Administered Medications:     acetaminophen (TYLENOL) tablet 975 mg, Q8H PRN    ceFAZolin (ANCEF) IVPB (premix in dextrose) 2,000 mg 50 mL, Q8H, Last Rate: 2,000 mg (05/23/25 0803)    enoxaparin (LOVENOX) subcutaneous injection 60 mg, BID    furosemide (LASIX) tablet 20 mg, Daily    lisinopril (ZESTRIL) tablet 20 mg, Daily    morphine injection 4 mg, Q4H PRN    oxyCODONE (ROXICODONE) IR tablet 2.5 mg, Q6H PRN    oxyCODONE (ROXICODONE) IR tablet 5 mg, Q6H PRN    spironolactone (ALDACTONE) tablet 25 mg, Daily    Administrative Statements   Today, Patient Was Seen By: Ashu Adler,  MD      **Please Note: This note may have been constructed using a voice recognition system.**

## 2025-05-23 NOTE — UTILIZATION REVIEW
NOTIFICATION OF INPATIENT ADMISSION   AUTHORIZATION REQUEST   SERVICING FACILITY:   [place] [address]  Tax ID: [id number]  NPI: [id number] ATTENDING PROVIDER:  Attending Name and NPI#: Ashu Adler Md [[id number]]  Address: [address] [place] [address]  Phone: [phone]   ADMISSION INFORMATION:  Place of Service: Inpatient [facility]  Place of Service Code: 21  Inpatient Admission Date/Time: 5/22/25  9:06 PM  Discharge Date/Time: No discharge date for patient encounter.  Admitting Diagnosis Code/Description:  Cellulitis [L03.90]  Cellulitis of right lower leg [L03.115]     UTILIZATION REVIEW CONTACT:  Elaine Martinez, Utilization   Network Utilization Review Department  Phone: [phone]  Fax [phone]  Email: [email or web address]  Contact for approvals/pending authorizations, clinical reviews, and discharge.     PHYSICIAN ADVISORY SERVICES:  Medical Necessity Denial & [id number]-sk-[id number] Review  Phone: [phone]  Fax: [phone]  Email: [email or web address]     DISCHARGE SUPPORT TEAM:  For Patients Discharge Needs & Updates  Phone: [phone] opt. 2 Fax: [phone]  Email: [email or web address]

## 2025-05-24 LAB
ALBUMIN SERPL BCG-MCNC: 3.3 G/DL (ref 3.5–5)
ALP SERPL-CCNC: 76 U/L (ref 34–104)
ALT SERPL W P-5'-P-CCNC: 18 U/L (ref 7–52)
ANION GAP SERPL CALCULATED.3IONS-SCNC: 7 MMOL/L (ref 4–13)
AST SERPL W P-5'-P-CCNC: 17 U/L (ref 13–39)
BASOPHILS # BLD AUTO: 0.02 THOUSANDS/ÂΜL (ref 0–0.1)
BASOPHILS NFR BLD AUTO: 0 % (ref 0–1)
BILIRUB SERPL-MCNC: 1.02 MG/DL (ref 0.2–1)
BUN SERPL-MCNC: 12 MG/DL (ref 5–25)
CALCIUM ALBUM COR SERPL-MCNC: 9.3 MG/DL (ref 8.3–10.1)
CALCIUM SERPL-MCNC: 8.7 MG/DL (ref 8.4–10.2)
CHLORIDE SERPL-SCNC: 102 MMOL/L (ref 96–108)
CO2 SERPL-SCNC: 28 MMOL/L (ref 21–32)
CREAT SERPL-MCNC: 0.85 MG/DL (ref 0.6–1.3)
EOSINOPHIL # BLD AUTO: 0.13 THOUSAND/ÂΜL (ref 0–0.61)
EOSINOPHIL NFR BLD AUTO: 1 % (ref 0–6)
ERYTHROCYTE [DISTWIDTH] IN BLOOD BY AUTOMATED COUNT: 13 % (ref 11.6–15.1)
GFR SERPL CREATININE-BSD FRML MDRD: 104 ML/MIN/1.73SQ M
GLUCOSE SERPL-MCNC: 102 MG/DL (ref 65–140)
HCT VFR BLD AUTO: 42.4 % (ref 36.5–49.3)
HGB BLD-MCNC: 13.8 G/DL (ref 12–17)
IMM GRANULOCYTES # BLD AUTO: 0.05 THOUSAND/UL (ref 0–0.2)
IMM GRANULOCYTES NFR BLD AUTO: 1 % (ref 0–2)
LYMPHOCYTES # BLD AUTO: 1.62 THOUSANDS/ÂΜL (ref 0.6–4.47)
LYMPHOCYTES NFR BLD AUTO: 18 % (ref 14–44)
MCH RBC QN AUTO: 28.4 PG (ref 26.8–34.3)
MCHC RBC AUTO-ENTMCNC: 32.5 G/DL (ref 31.4–37.4)
MCV RBC AUTO: 87 FL (ref 82–98)
MONOCYTES # BLD AUTO: 0.85 THOUSAND/ÂΜL (ref 0.17–1.22)
MONOCYTES NFR BLD AUTO: 9 % (ref 4–12)
NEUTROPHILS # BLD AUTO: 6.54 THOUSANDS/ÂΜL (ref 1.85–7.62)
NEUTS SEG NFR BLD AUTO: 71 % (ref 43–75)
NRBC BLD AUTO-RTO: 0 /100 WBCS
PLATELET # BLD AUTO: 191 THOUSANDS/UL (ref 149–390)
PMV BLD AUTO: 11.2 FL (ref 8.9–12.7)
POTASSIUM SERPL-SCNC: 3.3 MMOL/L (ref 3.5–5.3)
PROT SERPL-MCNC: 6.8 G/DL (ref 6.4–8.4)
RBC # BLD AUTO: 4.86 MILLION/UL (ref 3.88–5.62)
SODIUM SERPL-SCNC: 137 MMOL/L (ref 135–147)
WBC # BLD AUTO: 9.21 THOUSAND/UL (ref 4.31–10.16)

## 2025-05-24 PROCEDURE — 85025 COMPLETE CBC W/AUTO DIFF WBC: CPT | Performed by: FAMILY MEDICINE

## 2025-05-24 PROCEDURE — 80053 COMPREHEN METABOLIC PANEL: CPT | Performed by: FAMILY MEDICINE

## 2025-05-24 PROCEDURE — 99232 SBSQ HOSP IP/OBS MODERATE 35: CPT | Performed by: FAMILY MEDICINE

## 2025-05-24 RX ADMIN — FUROSEMIDE 40 MG: 10 INJECTION, SOLUTION INTRAMUSCULAR; INTRAVENOUS at 15:47

## 2025-05-24 RX ADMIN — SPIRONOLACTONE 25 MG: 25 TABLET, FILM COATED ORAL at 08:55

## 2025-05-24 RX ADMIN — CEFAZOLIN SODIUM 2000 MG: 2 SOLUTION INTRAVENOUS at 08:55

## 2025-05-24 RX ADMIN — OXYCODONE HYDROCHLORIDE 5 MG: 5 TABLET ORAL at 08:57

## 2025-05-24 RX ADMIN — ACETAMINOPHEN 975 MG: 325 TABLET ORAL at 02:03

## 2025-05-24 RX ADMIN — LISINOPRIL 20 MG: 20 TABLET ORAL at 08:55

## 2025-05-24 RX ADMIN — MORPHINE SULFATE 4 MG: 4 INJECTION INTRAVENOUS at 17:21

## 2025-05-24 RX ADMIN — MORPHINE SULFATE 4 MG: 4 INJECTION INTRAVENOUS at 05:26

## 2025-05-24 RX ADMIN — FUROSEMIDE 40 MG: 10 INJECTION, SOLUTION INTRAMUSCULAR; INTRAVENOUS at 08:55

## 2025-05-24 RX ADMIN — CEFAZOLIN SODIUM 2000 MG: 2 SOLUTION INTRAVENOUS at 02:01

## 2025-05-24 RX ADMIN — CEFAZOLIN SODIUM 2000 MG: 2 SOLUTION INTRAVENOUS at 14:39

## 2025-05-24 RX ADMIN — OXYCODONE HYDROCHLORIDE 5 MG: 5 TABLET ORAL at 15:47

## 2025-05-24 RX ADMIN — CEFAZOLIN SODIUM 2000 MG: 2 SOLUTION INTRAVENOUS at 19:44

## 2025-05-24 NOTE — PLAN OF CARE
Problem: Potential for Falls  Goal: Patient will remain free of falls  Description: INTERVENTIONS:  - Educate patient/family on patient safety including physical limitations  - Instruct patient to call for assistance with activity   - Consider consulting OT/PT to assist with strengthening/mobility based on AM PAC & JH-HLM score  - Consult OT/PT to assist with strengthening/mobility   - Keep Call bell within reach  - Keep bed low and locked with side rails adjusted as appropriate  - Keep care items and personal belongings within reach  - Initiate and maintain comfort rounds  - Make Fall Risk Sign visible to staff  - Offer Toileting every 2 Hours, in advance of need  - Initiate/Maintain alarm  - Obtain necessary fall risk management equipment   - Apply yellow socks and bracelet for high fall risk patients  - Consider moving patient to room near nurses station  Outcome: Progressing     Problem: Nutrition/Hydration-ADULT  Goal: Nutrient/Hydration intake appropriate for improving, restoring or maintaining nutritional needs  Description: Monitor and assess patient's nutrition/hydration status for malnutrition. Collaborate with interdisciplinary team and initiate plan and interventions as ordered.  Monitor patient's weight and dietary intake as ordered or per policy. Utilize nutrition screening tool and intervene as necessary. Determine patient's food preferences and provide high-protein, high-caloric foods as appropriate.     INTERVENTIONS:  - Monitor oral intake, urinary output, labs, and treatment plans  - Assess nutrition and hydration status and recommend course of action  - Evaluate amount of meals eaten  - Assist patient with eating if necessary   - Allow adequate time for meals  - Recommend/ encourage appropriate diets, oral nutritional supplements, and vitamin/mineral supplements  - Order, calculate, and assess calorie counts as needed  - Recommend, monitor, and adjust tube feedings and TPN/PPN based on assessed  needs  - Assess need for intravenous fluids  - Provide specific nutrition/hydration education as appropriate  - Include patient/family/caregiver in decisions related to nutrition  Outcome: Progressing     Problem: PAIN - ADULT  Goal: Verbalizes/displays adequate comfort level or baseline comfort level  Description: Interventions:  - Encourage patient to monitor pain and request assistance  - Assess pain using appropriate pain scale  - Administer analgesics as ordered based on type and severity of pain and evaluate response  - Implement non-pharmacological measures as appropriate and evaluate response  - Consider cultural and social influences on pain and pain management  - Notify physician/advanced practitioner if interventions unsuccessful or patient reports new pain  - Educate patient/family on pain management process including their role and importance of  reporting pain   - Provide non-pharmacologic/complimentary pain relief interventions  Outcome: Progressing     Problem: INFECTION - ADULT  Goal: Absence or prevention of progression during hospitalization  Description: INTERVENTIONS:  - Assess and monitor for signs and symptoms of infection  - Monitor lab/diagnostic results  - Monitor all insertion sites, i.e. indwelling lines, tubes, and drains  - Monitor endotracheal if appropriate and nasal secretions for changes in amount and color  - [place] appropriate cooling/warming therapies per order  - Administer medications as ordered  - Instruct and encourage patient and family to use good hand hygiene technique  - Identify and instruct in appropriate isolation precautions for identified infection/condition  Outcome: Progressing  Goal: Absence of fever/infection during neutropenic period  Description: INTERVENTIONS:  - Monitor WBC  - Perform strict hand hygiene  - Limit to healthy visitors only  - No plants, dried, fresh or silk flowers with valle in patient room  Outcome: Progressing     Problem: SAFETY  ADULT  Goal: Patient will remain free of falls  Description: INTERVENTIONS:  - Educate patient/family on patient safety including physical limitations  - Instruct patient to call for assistance with activity   - Consider consulting OT/PT to assist with strengthening/mobility based on AM PAC & JH-HLM score  - Consult OT/PT to assist with strengthening/mobility   - Keep Call bell within reach  - Keep bed low and locked with side rails adjusted as appropriate  - Keep care items and personal belongings within reach  - Initiate and maintain comfort rounds  - Make Fall Risk Sign visible to staff  - Offer Toileting every 2 Hours, in advance of need  - Initiate/Maintain alarm  - Obtain necessary fall risk management equipment   - Apply yellow socks and bracelet for high fall risk patients  - Consider moving patient to room near nurses station  Outcome: Progressing  Goal: Maintain or return to baseline ADL function  Description: INTERVENTIONS:  -  Assess patient's ability to carry out ADLs; assess patient's baseline for ADL function and identify physical deficits which impact ability to perform ADLs (bathing, care of mouth/teeth, toileting, grooming, dressing, etc.)  - Assess/evaluate cause of self-care deficits   - Assess range of motion  - Assess patient's mobility; develop plan if impaired  - Assess patient's need for assistive devices and provide as appropriate  - Encourage maximum independence but intervene and supervise when necessary  - Involve family in performance of ADLs  - Assess for home care needs following discharge   - Consider OT consult to assist with ADL evaluation and planning for discharge  - Provide patient education as appropriate  - Monitor functional capacity and physical performance, use of AM PAC & JH-HLM   - Monitor gait, balance and fatigue with ambulation    Outcome: Progressing  Goal: Maintains/Returns to pre admission functional level  Description: INTERVENTIONS:  - Perform AM-PAC 6 Click Basic  Mobility/ Daily Activity assessment daily.  - Set and communicate daily mobility goal to care team and patient/family/caregiver.   - Collaborate with rehabilitation services on mobility goals if consulted  - Perform Range of Motion 3 times a day.  - Reposition patient every 2 hours.  - Dangle patient 3 times a day  - Stand patient 3 times a day  - Ambulate patient 3 times a day  - Out of bed to chair 3 times a day   - Out of bed for meals 3 times a day  - Out of bed for toileting  - Record patient progress and toleration of activity level   Outcome: Progressing     Problem: DISCHARGE PLANNING  Goal: Discharge to home or other facility with appropriate resources  Description: INTERVENTIONS:  - Identify barriers to discharge w/patient and caregiver  - Arrange for needed discharge resources and transportation as appropriate  - Identify discharge learning needs (meds, wound care, etc.)  - Arrange for interpretive services to assist at discharge as needed  - Refer to Case Management Department for coordinating discharge planning if the patient needs post-hospital services based on physician/advanced practitioner order or complex needs related to functional status, cognitive ability, or social support system  Outcome: Progressing     Problem: Knowledge Deficit  Goal: Patient/family/caregiver demonstrates understanding of disease process, treatment plan, medications, and discharge instructions  Description: Complete learning assessment and assess knowledge base.  Interventions:  - Provide teaching at level of understanding  - Provide teaching via preferred learning methods  Outcome: Progressing

## 2025-05-24 NOTE — ASSESSMENT & PLAN NOTE
Presents with 2 days of fever, flulike symptoms and right lower extremity erythema.  Rx'd Keflex without improvement  History of recurrent right lower extremity cellulitis with previous hospitalizations and successful treatment with Ancef  Works as a , no history of MRSA  We will check MRSA culture swab for completeness  Trend fever curve and leukocytosis  Procalcitonin elevated  Blood cultures pending  X-ray right lower :without obvious gas formation  If no improvement over next 48 hours consider CT lower extremity   After discussion is done with infectious disease, will continue IV antibiotic and reassess.  Also patient started diuretics therapy to facilitate the recovery.  Can start using compressions.

## 2025-05-24 NOTE — ASSESSMENT & PLAN NOTE
Meeting criteria with tachycardia, leukocytosis with source of right lower extremity cellulitis source (recurrent in nature)  Blood cultures pending  Continue hide is off IV antibiotic per ID discussion

## 2025-05-24 NOTE — PROGRESS NOTES
Progress Note - Hospitalist   Name: Nicolás Balderrama 45 y.o. male I MRN: [id number]  Unit/Bed#: -01 I Date of Admission: 5/22/2025   Date of Service: 5/24/2025 I Hospital Day: 2    Assessment & Plan  Sepsis (HCC)  Meeting criteria with tachycardia, leukocytosis with source of right lower extremity cellulitis source (recurrent in nature)  Blood cultures pending  Continue hide is off IV antibiotic per ID discussion  Cellulitis of right lower extremity  Presents with 2 days of fever, flulike symptoms and right lower extremity erythema.  Rx'd Keflex without improvement  History of recurrent right lower extremity cellulitis with previous hospitalizations and successful treatment with Ancef  Works as a , no history of MRSA  We will check MRSA culture swab for completeness  Trend fever curve and leukocytosis  Procalcitonin elevated  Blood cultures pending  X-ray right lower :without obvious gas formation  If no improvement over next 48 hours consider CT lower extremity   After discussion is done with infectious disease, will continue IV antibiotic and reassess.  Also patient started diuretics therapy to facilitate the recovery.  Can start using compressions.      Morbid obesity (HCC)  BMI 53  Morbid obesity complicating all aspects of healthcare  Requires intensive lifestyle modification   Essential hypertension  Continue PTA lisinopril, furosemide, spironolactone  Trend blood pressures    VTE Pharmacologic Prophylaxis:   High Risk (Score >/= 5) - Pharmacological DVT Prophylaxis Ordered: enoxaparin (Lovenox). Sequential Compression Devices Ordered.    Mobility:   Basic Mobility Inpatient Raw Score: 24  JH-HLM Goal: 8: Walk 250 feet or more  JH-HLM Achieved: 8: Walk 250 feet ot more  JH-HLM Goal achieved. Continue to encourage appropriate mobility.    Patient Centered Rounds: I performed bedside rounds with nursing staff today.   Discussions with Specialists or Other Care Team Provider: ID    Education  and Discussions with Family / Patient: With patient.     Current Length of Stay: 2 day(s)  Current Patient Status: Inpatient   Certification Statement: The patient will continue to require additional inpatient hospital stay due to sepsis, recurrent cellulitis requiring IV antibiotic  Discharge Plan: Anticipate discharge in 48-72 hrs to home.    Code Status: Level 1 - Full Code    Subjective   Seen and evaluated during the run.  Still having redness.  Denies any nausea, vomiting, diarrhea.    Objective :  Temp:  [97.3 °F (36.3 °C)-97.7 °F (36.5 °C)] 97.7 °F (36.5 °C)  HR:  [80-95] 80  BP: (121-148)/(76-99) 148/99  Resp:  [18] 18  SpO2:  [95 %-98 %] 96 %  O2 Device: None (Room air)    Body mass index is 53.16 kg/m².     Input and Output Summary (last 24 hours):     Intake/Output Summary (Last 24 hours) at 5/24/2025 0926  Last data filed at 5/23/2025 1230  Gross per 24 hour   Intake 210 ml   Output --   Net 210 ml       Physical Exam  Vitals and nursing note reviewed.   Constitutional:       Appearance: Normal appearance. He is obese. He is not ill-appearing or diaphoretic.   HENT:      Nose: Nose normal. No congestion.     Eyes:      Extraocular Movements: Extraocular movements intact.      Conjunctiva/sclera: Conjunctivae normal.      Pupils: Pupils are equal, round, and reactive to light.       Cardiovascular:      Rate and Rhythm: Normal rate.      Heart sounds: No murmur heard.     No friction rub. No gallop.   Pulmonary:      Effort: No respiratory distress.      Breath sounds: No stridor. No wheezing or rhonchi.   Abdominal:      General: There is no distension.      Palpations: There is no mass.      Tenderness: There is no abdominal tenderness.      Hernia: No hernia is present.     Musculoskeletal:         General: Tenderness (lower extremity) present.      Right lower leg: No edema.      Left lower leg: No edema.     Skin:     Coloration: Pallor: lower extremity.      Findings: Erythema present.      Neurological:      Mental Status: He is alert and oriented to person, place, and time.      Cranial Nerves: No cranial nerve deficit.      Sensory: No sensory deficit.      Motor: No weakness.      Coordination: Coordination normal.         Lines/Drains:              Lab Results: I have reviewed the following results:   Results from last 7 days   Lab Units 05/24/25  0514   WBC Thousand/uL 9.21   HEMOGLOBIN g/dL 13.8   HEMATOCRIT % 42.4   PLATELETS Thousands/uL 191   SEGS PCT % 71   LYMPHO PCT % 18   MONO PCT % 9   EOS PCT % 1     Results from last 7 days   Lab Units 05/24/25  0514   SODIUM mmol/L 137   POTASSIUM mmol/L 3.3*   CHLORIDE mmol/L 102   CO2 mmol/L 28   BUN mg/dL 12   CREATININE mg/dL 0.85   ANION GAP mmol/L 7   CALCIUM mg/dL 8.7   ALBUMIN g/dL 3.3*   TOTAL BILIRUBIN mg/dL 1.02*   ALK PHOS U/L 76   ALT U/L 18   AST U/L 17   GLUCOSE RANDOM mg/dL 102         Results from last 7 days   Lab Units 05/23/25  0741   POC GLUCOSE mg/dl 130         Results from last 7 days   Lab Units 05/23/25  0537 05/22/25  1825   LACTIC ACID mmol/L  --  1.2   PROCALCITONIN ng/ml 5.12* 6.78*       Recent Cultures (last 7 days):   Results from last 7 days   Lab Units 05/22/25  1941 05/22/25  1825   BLOOD CULTURE  Received in Microbiology Lab. Culture in Progress. No Growth at 24 hrs.       Imaging Results Review: No pertinent imaging studies reviewed.  Other Study Results Review: No additional pertinent studies reviewed.    Last 24 Hours Medication List:     Current Facility-Administered Medications:     acetaminophen (TYLENOL) tablet 975 mg, Q8H PRN    ceFAZolin (ANCEF) IVPB (premix in dextrose) 2,000 mg 50 mL, Q6H, Last Rate: 2,000 mg (05/24/25 0855)    enoxaparin (LOVENOX) subcutaneous injection 60 mg, BID    furosemide (LASIX) injection 40 mg, BID (diuretic)    lisinopril (ZESTRIL) tablet 20 mg, Daily    morphine injection 4 mg, Q4H PRN    oxyCODONE (ROXICODONE) IR tablet 2.5 mg, Q6H PRN    oxyCODONE (ROXICODONE) IR tablet  5 mg, Q6H PRN    spironolactone (ALDACTONE) tablet 25 mg, Daily    Administrative Statements   Today, Patient Was Seen By: Ashu Adler MD      **Please Note: This note may have been constructed using a voice recognition system.**

## 2025-05-25 LAB
ALBUMIN SERPL BCG-MCNC: 3.6 G/DL (ref 3.5–5)
ALP SERPL-CCNC: 80 U/L (ref 34–104)
ALT SERPL W P-5'-P-CCNC: 14 U/L (ref 7–52)
ANION GAP SERPL CALCULATED.3IONS-SCNC: 9 MMOL/L (ref 4–13)
AST SERPL W P-5'-P-CCNC: 19 U/L (ref 13–39)
BASOPHILS # BLD AUTO: 0.04 THOUSANDS/ÂΜL (ref 0–0.1)
BASOPHILS NFR BLD AUTO: 1 % (ref 0–1)
BILIRUB SERPL-MCNC: 0.99 MG/DL (ref 0.2–1)
BUN SERPL-MCNC: 13 MG/DL (ref 5–25)
CALCIUM SERPL-MCNC: 8.8 MG/DL (ref 8.4–10.2)
CHLORIDE SERPL-SCNC: 100 MMOL/L (ref 96–108)
CO2 SERPL-SCNC: 27 MMOL/L (ref 21–32)
CREAT SERPL-MCNC: 0.76 MG/DL (ref 0.6–1.3)
EOSINOPHIL # BLD AUTO: 0.2 THOUSAND/ÂΜL (ref 0–0.61)
EOSINOPHIL NFR BLD AUTO: 2 % (ref 0–6)
ERYTHROCYTE [DISTWIDTH] IN BLOOD BY AUTOMATED COUNT: 12.9 % (ref 11.6–15.1)
GFR SERPL CREATININE-BSD FRML MDRD: 109 ML/MIN/1.73SQ M
GLUCOSE SERPL-MCNC: 103 MG/DL (ref 65–140)
HCT VFR BLD AUTO: 43.9 % (ref 36.5–49.3)
HGB BLD-MCNC: 14.5 G/DL (ref 12–17)
IMM GRANULOCYTES # BLD AUTO: 0.05 THOUSAND/UL (ref 0–0.2)
IMM GRANULOCYTES NFR BLD AUTO: 1 % (ref 0–2)
LYMPHOCYTES # BLD AUTO: 1.74 THOUSANDS/ÂΜL (ref 0.6–4.47)
LYMPHOCYTES NFR BLD AUTO: 21 % (ref 14–44)
MCH RBC QN AUTO: 28.8 PG (ref 26.8–34.3)
MCHC RBC AUTO-ENTMCNC: 33 G/DL (ref 31.4–37.4)
MCV RBC AUTO: 87 FL (ref 82–98)
MONOCYTES # BLD AUTO: 0.77 THOUSAND/ÂΜL (ref 0.17–1.22)
MONOCYTES NFR BLD AUTO: 9 % (ref 4–12)
NEUTROPHILS # BLD AUTO: 5.49 THOUSANDS/ÂΜL (ref 1.85–7.62)
NEUTS SEG NFR BLD AUTO: 66 % (ref 43–75)
NRBC BLD AUTO-RTO: 0 /100 WBCS
PLATELET # BLD AUTO: 232 THOUSANDS/UL (ref 149–390)
PMV BLD AUTO: 11.1 FL (ref 8.9–12.7)
POTASSIUM SERPL-SCNC: 3.6 MMOL/L (ref 3.5–5.3)
PROT SERPL-MCNC: 7.1 G/DL (ref 6.4–8.4)
RBC # BLD AUTO: 5.03 MILLION/UL (ref 3.88–5.62)
SODIUM SERPL-SCNC: 136 MMOL/L (ref 135–147)
WBC # BLD AUTO: 8.29 THOUSAND/UL (ref 4.31–10.16)

## 2025-05-25 PROCEDURE — 99232 SBSQ HOSP IP/OBS MODERATE 35: CPT | Performed by: FAMILY MEDICINE

## 2025-05-25 PROCEDURE — 85025 COMPLETE CBC W/AUTO DIFF WBC: CPT | Performed by: FAMILY MEDICINE

## 2025-05-25 PROCEDURE — 80053 COMPREHEN METABOLIC PANEL: CPT | Performed by: FAMILY MEDICINE

## 2025-05-25 RX ORDER — FUROSEMIDE 10 MG/ML
40 INJECTION INTRAMUSCULAR; INTRAVENOUS DAILY
Status: DISCONTINUED | OUTPATIENT
Start: 2025-05-25 | End: 2025-05-27

## 2025-05-25 RX ADMIN — CEFAZOLIN SODIUM 2000 MG: 2 SOLUTION INTRAVENOUS at 20:27

## 2025-05-25 RX ADMIN — FUROSEMIDE 40 MG: 10 INJECTION, SOLUTION INTRAMUSCULAR; INTRAVENOUS at 11:04

## 2025-05-25 RX ADMIN — CEFAZOLIN SODIUM 2000 MG: 2 SOLUTION INTRAVENOUS at 10:10

## 2025-05-25 RX ADMIN — LISINOPRIL 20 MG: 20 TABLET ORAL at 10:06

## 2025-05-25 RX ADMIN — OXYCODONE HYDROCHLORIDE 5 MG: 5 TABLET ORAL at 10:06

## 2025-05-25 RX ADMIN — CEFAZOLIN SODIUM 2000 MG: 2 SOLUTION INTRAVENOUS at 01:23

## 2025-05-25 RX ADMIN — OXYCODONE HYDROCHLORIDE 5 MG: 5 TABLET ORAL at 02:14

## 2025-05-25 RX ADMIN — SPIRONOLACTONE 25 MG: 25 TABLET, FILM COATED ORAL at 10:07

## 2025-05-25 RX ADMIN — OXYCODONE HYDROCHLORIDE 5 MG: 5 TABLET ORAL at 21:11

## 2025-05-25 RX ADMIN — MORPHINE SULFATE 4 MG: 4 INJECTION INTRAVENOUS at 14:12

## 2025-05-25 RX ADMIN — CEFAZOLIN SODIUM 2000 MG: 2 SOLUTION INTRAVENOUS at 14:07

## 2025-05-25 NOTE — PLAN OF CARE
Problem: Potential for Falls  Goal: Patient will remain free of falls  Description: INTERVENTIONS:  - Educate patient/family on patient safety including physical limitations  - Instruct patient to call for assistance with activity   - Consider consulting OT/PT to assist with strengthening/mobility based on AM PAC & JH-HLM score  - Consult OT/PT to assist with strengthening/mobility   - Keep Call bell within reach  - Keep bed low and locked with side rails adjusted as appropriate  - Keep care items and personal belongings within reach  - Initiate and maintain comfort rounds  - Make Fall Risk Sign visible to staff  - Offer Toileting every 2 Hours, in advance of need  - Apply yellow socks and bracelet for high fall risk patients  - Consider moving patient to room near nurses station  Outcome: Progressing    Problem: Nutrition/Hydration-ADULT  Goal: Nutrient/Hydration intake appropriate for improving, restoring or maintaining nutritional needs  Description: Monitor and assess patient's nutrition/hydration status for malnutrition. Collaborate with interdisciplinary team and initiate plan and interventions as ordered.  Monitor patient's weight and dietary intake as ordered or per policy. Utilize nutrition screening tool and intervene as necessary. Determine patient's food preferences and provide high-protein, high-caloric foods as appropriate.     INTERVENTIONS:  - Monitor oral intake, urinary output, labs, and treatment plans  - Assess nutrition and hydration status and recommend course of action  - Evaluate amount of meals eaten  - Assist patient with eating if necessary   - Allow adequate time for meals  - Recommend/ encourage appropriate diets, oral nutritional supplements, and vitamin/mineral supplements  - Order, calculate, and assess calorie counts as needed  - Recommend, monitor, and adjust tube feedings and TPN/PPN based on assessed needs  - Assess need for intravenous fluids  - Provide specific  nutrition/hydration education as appropriate  - Include patient/family/caregiver in decisions related to nutrition  Outcome: Progressing     Problem: PAIN - ADULT  Goal: Verbalizes/displays adequate comfort level or baseline comfort level  Description: Interventions:  - Encourage patient to monitor pain and request assistance  - Assess pain using appropriate pain scale  - Administer analgesics as ordered based on type and severity of pain and evaluate response  - Implement non-pharmacological measures as appropriate and evaluate response  - Consider cultural and social influences on pain and pain management  - Notify physician/advanced practitioner if interventions unsuccessful or patient reports new pain  - Educate patient/family on pain management process including their role and importance of  reporting pain   - Provide non-pharmacologic/complimentary pain relief interventions  Outcome: Progressing     Problem: INFECTION - ADULT  Goal: Absence or prevention of progression during hospitalization  Description: INTERVENTIONS:  - Assess and monitor for signs and symptoms of infection  - Monitor lab/diagnostic results  - Monitor all insertion sites, i.e. indwelling lines, tubes, and drains  - Monitor endotracheal if appropriate and nasal secretions for changes in amount and color  - [place] appropriate cooling/warming therapies per order  - Administer medications as ordered  - Instruct and encourage patient and family to use good hand hygiene technique  - Identify and instruct in appropriate isolation precautions for identified infection/condition  Outcome: Progressing  Goal: Absence of fever/infection during neutropenic period  Description: INTERVENTIONS:  - Monitor WBC  - Perform strict hand hygiene  - Limit to healthy visitors only  - No plants, dried, fresh or silk flowers with valle in patient room  Outcome: Progressing     Problem: SAFETY ADULT  Goal: Patient will remain free of falls  Description:  INTERVENTIONS:  - Educate patient/family on patient safety including physical limitations  - Instruct patient to call for assistance with activity   - Consider consulting OT/PT to assist with strengthening/mobility based on AM PAC & JH-HLM score  - Consult OT/PT to assist with strengthening/mobility   - Keep Call bell within reach  - Keep bed low and locked with side rails adjusted as appropriate  - Keep care items and personal belongings within reach  - Initiate and maintain comfort rounds  - Make Fall Risk Sign visible to staff  - Offer Toileting every 2 Hours, in advance of need  - Apply yellow socks and bracelet for high fall risk patients  - Consider moving patient to room near nurses station  Outcome: Progressing  Goal: Maintain or return to baseline ADL function  Description: INTERVENTIONS:  -  Assess patient's ability to carry out ADLs; assess patient's baseline for ADL function and identify physical deficits which impact ability to perform ADLs (bathing, care of mouth/teeth, toileting, grooming, dressing, etc.)  - Assess/evaluate cause of self-care deficits   - Assess range of motion  - Assess patient's mobility; develop plan if impaired  - Assess patient's need for assistive devices and provide as appropriate  - Encourage maximum independence but intervene and supervise when necessary  - Involve family in performance of ADLs  - Assess for home care needs following discharge   - Consider OT consult to assist with ADL evaluation and planning for discharge  - Provide patient education as appropriate  - Monitor functional capacity and physical performance, use of AM PAC & JH-HLM   - Monitor gait, balance and fatigue with ambulation    Outcome: Progressing  Goal: Maintains/Returns to pre admission functional level  Description: INTERVENTIONS:  - Perform AM-PAC 6 Click Basic Mobility/ Daily Activity assessment daily.  - Set and communicate daily mobility goal to care team and patient/family/caregiver.   -  Collaborate with rehabilitation services on mobility goals if consulted  - Perform Range of Motion 3 times a day.  - Out of bed to chair 3 times a day   - Out of bed for meals 3 times a day  - Out of bed for toileting  - Record patient progress and toleration of activity level   Outcome: Progressing    Problem: DISCHARGE PLANNING  Goal: Discharge to home or other facility with appropriate resources  Description: INTERVENTIONS:  - Identify barriers to discharge w/patient and caregiver  - Arrange for needed discharge resources and transportation as appropriate  - Identify discharge learning needs (meds, wound care, etc.)  - Arrange for interpretive services to assist at discharge as needed  - Refer to Case Management Department for coordinating discharge planning if the patient needs post-hospital services based on physician/advanced practitioner order or complex needs related to functional status, cognitive ability, or social support system  Outcome: Progressing     Problem: Knowledge Deficit  Goal: Patient/family/caregiver demonstrates understanding of disease process, treatment plan, medications, and discharge instructions  Description: Complete learning assessment and assess knowledge base.  Interventions:  - Provide teaching at level of understanding  - Provide teaching via preferred learning methods  Outcome: Progressing

## 2025-05-25 NOTE — PLAN OF CARE
Problem: Potential for Falls  Goal: Patient will remain free of falls  Description: INTERVENTIONS:  - Educate patient/family on patient safety including physical limitations  - Instruct patient to call for assistance with activity   - Consider consulting OT/PT to assist with strengthening/mobility based on AM PAC & JH-HLM score  - Consult OT/PT to assist with strengthening/mobility   - Keep Call bell within reach  - Keep bed low and locked with side rails adjusted as appropriate  - Keep care items and personal belongings within reach  - Initiate and maintain comfort rounds  - Make Fall Risk Sign visible to staff  - Apply yellow socks and bracelet for high fall risk patients  - Consider moving patient to room near nurses station  Outcome: Progressing     Problem: Nutrition/Hydration-ADULT  Goal: Nutrient/Hydration intake appropriate for improving, restoring or maintaining nutritional needs  Description: Monitor and assess patient's nutrition/hydration status for malnutrition. Collaborate with interdisciplinary team and initiate plan and interventions as ordered.  Monitor patient's weight and dietary intake as ordered or per policy. Utilize nutrition screening tool and intervene as necessary. Determine patient's food preferences and provide high-protein, high-caloric foods as appropriate.     INTERVENTIONS:  - Monitor oral intake, urinary output, labs, and treatment plans  - Assess nutrition and hydration status and recommend course of action  - Evaluate amount of meals eaten  - Assist patient with eating if necessary   - Allow adequate time for meals  - Recommend/ encourage appropriate diets, oral nutritional supplements, and vitamin/mineral supplements  - Order, calculate, and assess calorie counts as needed  - Recommend, monitor, and adjust tube feedings and TPN/PPN based on assessed needs  - Assess need for intravenous fluids  - Provide specific nutrition/hydration education as appropriate  - Include  patient/family/caregiver in decisions related to nutrition  Outcome: Progressing     Problem: PAIN - ADULT  Goal: Verbalizes/displays adequate comfort level or baseline comfort level  Description: Interventions:  - Encourage patient to monitor pain and request assistance  - Assess pain using appropriate pain scale  - Administer analgesics as ordered based on type and severity of pain and evaluate response  - Implement non-pharmacological measures as appropriate and evaluate response  - Consider cultural and social influences on pain and pain management  - Notify physician/advanced practitioner if interventions unsuccessful or patient reports new pain  - Educate patient/family on pain management process including their role and importance of  reporting pain   - Provide non-pharmacologic/complimentary pain relief interventions  Outcome: Progressing     Problem: INFECTION - ADULT  Goal: Absence or prevention of progression during hospitalization  Description: INTERVENTIONS:  - Assess and monitor for signs and symptoms of infection  - Monitor lab/diagnostic results  - Monitor all insertion sites, i.e. indwelling lines, tubes, and drains  - Monitor endotracheal if appropriate and nasal secretions for changes in amount and color  - [place] appropriate cooling/warming therapies per order  - Administer medications as ordered  - Instruct and encourage patient and family to use good hand hygiene technique  - Identify and instruct in appropriate isolation precautions for identified infection/condition  Outcome: Progressing  Goal: Absence of fever/infection during neutropenic period  Description: INTERVENTIONS:  - Monitor WBC  - Perform strict hand hygiene  - Limit to healthy visitors only  - No plants, dried, fresh or silk flowers with valle in patient room  Outcome: Progressing     Problem: SAFETY ADULT  Goal: Patient will remain free of falls  Description: INTERVENTIONS:  - Educate patient/family on patient safety including  physical limitations  - Instruct patient to call for assistance with activity   - Consider consulting OT/PT to assist with strengthening/mobility based on AM PAC & JH-HLM score  - Consult OT/PT to assist with strengthening/mobility   - Keep Call bell within reach  - Keep bed low and locked with side rails adjusted as appropriate  - Keep care items and personal belongings within reach  - Initiate and maintain comfort rounds  - Make Fall Risk Sign visible to staff  - Apply yellow socks and bracelet for high fall risk patients  - Consider moving patient to room near nurses station  Outcome: Progressing  Goal: Maintain or return to baseline ADL function  Description: INTERVENTIONS:  -  Assess patient's ability to carry out ADLs; assess patient's baseline for ADL function and identify physical deficits which impact ability to perform ADLs (bathing, care of mouth/teeth, toileting, grooming, dressing, etc.)  - Assess/evaluate cause of self-care deficits   - Assess range of motion  - Assess patient's mobility; develop plan if impaired  - Assess patient's need for assistive devices and provide as appropriate  - Encourage maximum independence but intervene and supervise when necessary  - Involve family in performance of ADLs  - Assess for home care needs following discharge   - Consider OT consult to assist with ADL evaluation and planning for discharge  - Provide patient education as appropriate  - Monitor functional capacity and physical performance, use of AM PAC & JH-HLM   - Monitor gait, balance and fatigue with ambulation    Outcome: Progressing  Goal: Maintains/Returns to pre admission functional level  Description: INTERVENTIONS:  - Perform AM-PAC 6 Click Basic Mobility/ Daily Activity assessment daily.  - Set and communicate daily mobility goal to care team and patient/family/caregiver.   - Collaborate with rehabilitation services on mobility goals if consulted  - Out of bed for toileting  - Record patient progress  and toleration of activity level   Outcome: Progressing     Problem: DISCHARGE PLANNING  Goal: Discharge to home or other facility with appropriate resources  Description: INTERVENTIONS:  - Identify barriers to discharge w/patient and caregiver  - Arrange for needed discharge resources and transportation as appropriate  - Identify discharge learning needs (meds, wound care, etc.)  - Arrange for interpretive services to assist at discharge as needed  - Refer to Case Management Department for coordinating discharge planning if the patient needs post-hospital services based on physician/advanced practitioner order or complex needs related to functional status, cognitive ability, or social support system  Outcome: Progressing     Problem: Knowledge Deficit  Goal: Patient/family/caregiver demonstrates understanding of disease process, treatment plan, medications, and discharge instructions  Description: Complete learning assessment and assess knowledge base.  Interventions:  - Provide teaching at level of understanding  - Provide teaching via preferred learning methods  Outcome: Progressing

## 2025-05-25 NOTE — PROGRESS NOTES
Progress Note - Hospitalist   Name: Nicolás Balderrama 45 y.o. male I MRN: [id number]  Unit/Bed#: -01 I Date of Admission: 5/22/2025   Date of Service: 5/25/2025 I Hospital Day: 3    Assessment & Plan  Sepsis (HCC)  Meeting criteria with tachycardia, leukocytosis with source of right lower extremity cellulitis source (recurrent in nature)  Blood cultures pending  Continue hide is off IV antibiotic per ID discussion  Cellulitis of right lower extremity  Presents with 2 days of fever, flulike symptoms and right lower extremity erythema.  Rx'd Keflex without improvement  History of recurrent right lower extremity cellulitis with previous hospitalizations and successful treatment with Ancef  Works as a , no history of MRSA  We will check MRSA culture swab for completeness  Trend fever curve and leukocytosis  Procalcitonin elevated  Blood cultures pending  X-ray right lower :without obvious gas formation  If no improvement over next 48 hours consider CT lower extremity   After discussion is done with infectious disease, will continue IV antibiotic and reassess.  Also patient started diuretics therapy to facilitate the recovery.  Can start using compressions.      Morbid obesity (HCC)  BMI 53  Morbid obesity complicating all aspects of healthcare  Requires intensive lifestyle modification   Essential hypertension  Continue PTA lisinopril, furosemide, spironolactone  Trend blood pressures    VTE Pharmacologic Prophylaxis:   Moderate Risk (Score 3-4) - Pharmacological DVT Prophylaxis Ordered: enoxaparin (Lovenox).    Mobility:   Basic Mobility Inpatient Raw Score: 24  JH-HLM Goal: 8: Walk 250 feet or more  JH-HLM Achieved: 7: Walk 25 feet or more  JH-HLM Goal NOT achieved. Continue with multidisciplinary rounding and encourage appropriate mobility to improve upon JH-HLM goals.    Patient Centered Rounds: I performed bedside rounds with nursing staff today.   Discussions with Specialists or Other Care Team  Provider: None    Education and Discussions with Family / Patient: with patient.     Current Length of Stay: 3 day(s)  Current Patient Status: Inpatient   Certification Statement: The patient will continue to require additional inpatient hospital stay due to lower extremity cellulitis, requiring IV antibiotic  Discharge Plan: Anticipate discharge tomorrow to home.    Code Status: Level 1 - Full Code    Subjective   Evaluated and examined, resting comfortably.  Denies any significant complaint.    Objective :  Temp:  [97.3 °F (36.3 °C)-97.9 °F (36.6 °C)] 97.9 °F (36.6 °C)  HR:  [82-99] 82  BP: (139-145)/(97) 139/97  Resp:  [18] 18  SpO2:  [96 %-98 %] 98 %  O2 Device: None (Room air)    Body mass index is 53.16 kg/m².     Input and Output Summary (last 24 hours):     Intake/Output Summary (Last 24 hours) at 5/25/2025 0912  Last data filed at 5/25/2025 0611  Gross per 24 hour   Intake 840 ml   Output --   Net 840 ml       Physical Exam  Vitals and nursing note reviewed.   Constitutional:       Appearance: He is obese. He is not ill-appearing or diaphoretic.     Cardiovascular:      Rate and Rhythm: Normal rate.      Heart sounds:      No friction rub. No gallop.     Musculoskeletal:         General: No tenderness.      Right lower leg: No edema.      Left lower leg: No edema.     Skin:     Findings: Erythema (fading erythema) present.     Neurological:      Mental Status: He is alert. He is disoriented.      Gait: Gait normal.     Psychiatric:         Mood and Affect: Mood normal.           Lines/Drains:              Lab Results: I have reviewed the following results:   Results from last 7 days   Lab Units 05/25/25  0511   WBC Thousand/uL 8.29   HEMOGLOBIN g/dL 14.5   HEMATOCRIT % 43.9   PLATELETS Thousands/uL 232   SEGS PCT % 66   LYMPHO PCT % 21   MONO PCT % 9   EOS PCT % 2     Results from last 7 days   Lab Units 05/25/25  0511   SODIUM mmol/L 136   POTASSIUM mmol/L 3.6   CHLORIDE mmol/L 100   CO2 mmol/L 27   BUN  mg/dL 13   CREATININE mg/dL 0.76   ANION GAP mmol/L 9   CALCIUM mg/dL 8.8   ALBUMIN g/dL 3.6   TOTAL BILIRUBIN mg/dL 0.99   ALK PHOS U/L 80   ALT U/L 14   AST U/L 19   GLUCOSE RANDOM mg/dL 103         Results from last 7 days   Lab Units 05/23/25  0741   POC GLUCOSE mg/dl 130         Results from last 7 days   Lab Units 05/23/25  0537 05/22/25  1825   LACTIC ACID mmol/L  --  1.2   PROCALCITONIN ng/ml 5.12* 6.78*       Recent Cultures (last 7 days):   Results from last 7 days   Lab Units 05/22/25  1941 05/22/25  1825   BLOOD CULTURE  No Growth at 24 hrs. No Growth at 48 hrs.       Imaging Results Review: No pertinent imaging studies reviewed.  Other Study Results Review: No additional pertinent studies reviewed.    Last 24 Hours Medication List:     Current Facility-Administered Medications:     acetaminophen (TYLENOL) tablet 975 mg, Q8H PRN    ceFAZolin (ANCEF) IVPB (premix in dextrose) 2,000 mg 50 mL, Q6H, Last Rate: Stopped (05/25/25 0215)    enoxaparin (LOVENOX) subcutaneous injection 60 mg, BID    furosemide (LASIX) injection 40 mg, BID (diuretic)    lisinopril (ZESTRIL) tablet 20 mg, Daily    morphine injection 4 mg, Q4H PRN    oxyCODONE (ROXICODONE) IR tablet 2.5 mg, Q6H PRN    oxyCODONE (ROXICODONE) IR tablet 5 mg, Q6H PRN    spironolactone (ALDACTONE) tablet 25 mg, Daily    Administrative Statements   Today, Patient Was Seen By: Ashu Adler MD      **Please Note: This note may have been constructed using a voice recognition system.**

## 2025-05-26 ENCOUNTER — APPOINTMENT (INPATIENT)
Dept: CT IMAGING | Facility: HOSPITAL | Age: 45
DRG: 872 | End: 2025-05-26
Payer: COMMERCIAL

## 2025-05-26 PROCEDURE — 99232 SBSQ HOSP IP/OBS MODERATE 35: CPT | Performed by: FAMILY MEDICINE

## 2025-05-26 PROCEDURE — 73701 CT LOWER EXTREMITY W/DYE: CPT

## 2025-05-26 RX ADMIN — FUROSEMIDE 40 MG: 10 INJECTION, SOLUTION INTRAMUSCULAR; INTRAVENOUS at 08:29

## 2025-05-26 RX ADMIN — CEFAZOLIN SODIUM 2000 MG: 2 SOLUTION INTRAVENOUS at 01:13

## 2025-05-26 RX ADMIN — OXYCODONE HYDROCHLORIDE 5 MG: 5 TABLET ORAL at 20:34

## 2025-05-26 RX ADMIN — LISINOPRIL 20 MG: 20 TABLET ORAL at 08:29

## 2025-05-26 RX ADMIN — SPIRONOLACTONE 25 MG: 25 TABLET, FILM COATED ORAL at 08:29

## 2025-05-26 RX ADMIN — CEFAZOLIN SODIUM 2000 MG: 2 SOLUTION INTRAVENOUS at 14:29

## 2025-05-26 RX ADMIN — IOHEXOL 100 ML: 350 INJECTION, SOLUTION INTRAVENOUS at 10:58

## 2025-05-26 RX ADMIN — CEFAZOLIN SODIUM 2000 MG: 2 SOLUTION INTRAVENOUS at 08:29

## 2025-05-26 RX ADMIN — OXYCODONE HYDROCHLORIDE 5 MG: 5 TABLET ORAL at 09:00

## 2025-05-26 RX ADMIN — CEFAZOLIN SODIUM 2000 MG: 2 SOLUTION INTRAVENOUS at 20:29

## 2025-05-26 NOTE — PLAN OF CARE
Problem: Potential for Falls  Goal: Patient will remain free of falls  Description: INTERVENTIONS:  - Educate patient/family on patient safety including physical limitations  - Instruct patient to call for assistance with activity   - Consider consulting OT/PT to assist with strengthening/mobility based on AM PAC & JH-HLM score  - Consult OT/PT to assist with strengthening/mobility   - Keep Call bell within reach  - Keep bed low and locked with side rails adjusted as appropriate  - Keep care items and personal belongings within reach  - Initiate and maintain comfort rounds  - Make Fall Risk Sign visible to staff  - Apply yellow socks and bracelet for high fall risk patients  - Consider moving patient to room near nurses station  Outcome: Progressing     Problem: Nutrition/Hydration-ADULT  Goal: Nutrient/Hydration intake appropriate for improving, restoring or maintaining nutritional needs  Description: Monitor and assess patient's nutrition/hydration status for malnutrition. Collaborate with interdisciplinary team and initiate plan and interventions as ordered.  Monitor patient's weight and dietary intake as ordered or per policy. Utilize nutrition screening tool and intervene as necessary. Determine patient's food preferences and provide high-protein, high-caloric foods as appropriate.     INTERVENTIONS:  - Monitor oral intake, urinary output, labs, and treatment plans  - Assess nutrition and hydration status and recommend course of action  - Evaluate amount of meals eaten  - Assist patient with eating if necessary   - Allow adequate time for meals  - Recommend/ encourage appropriate diets, oral nutritional supplements, and vitamin/mineral supplements  - Order, calculate, and assess calorie counts as needed  - Recommend, monitor, and adjust tube feedings and TPN/PPN based on assessed needs  - Assess need for intravenous fluids  - Provide specific nutrition/hydration education as appropriate  - Include  patient/family/caregiver in decisions related to nutrition  Outcome: Progressing     Problem: PAIN - ADULT  Goal: Verbalizes/displays adequate comfort level or baseline comfort level  Description: Interventions:  - Encourage patient to monitor pain and request assistance  - Assess pain using appropriate pain scale  - Administer analgesics as ordered based on type and severity of pain and evaluate response  - Implement non-pharmacological measures as appropriate and evaluate response  - Consider cultural and social influences on pain and pain management  - Notify physician/advanced practitioner if interventions unsuccessful or patient reports new pain  - Educate patient/family on pain management process including their role and importance of  reporting pain   - Provide non-pharmacologic/complimentary pain relief interventions  Outcome: Progressing     Problem: INFECTION - ADULT  Goal: Absence or prevention of progression during hospitalization  Description: INTERVENTIONS:  - Assess and monitor for signs and symptoms of infection  - Monitor lab/diagnostic results  - Monitor all insertion sites, i.e. indwelling lines, tubes, and drains  - Monitor endotracheal if appropriate and nasal secretions for changes in amount and color  - [place] appropriate cooling/warming therapies per order  - Administer medications as ordered  - Instruct and encourage patient and family to use good hand hygiene technique  - Identify and instruct in appropriate isolation precautions for identified infection/condition  Outcome: Progressing  Goal: Absence of fever/infection during neutropenic period  Description: INTERVENTIONS:  - Monitor WBC  - Perform strict hand hygiene  - Limit to healthy visitors only  - No plants, dried, fresh or silk flowers with valle in patient room  Outcome: Progressing     Problem: SAFETY ADULT  Goal: Patient will remain free of falls  Description: INTERVENTIONS:  - Educate patient/family on patient safety including  physical limitations  - Instruct patient to call for assistance with activity   - Consider consulting OT/PT to assist with strengthening/mobility based on AM PAC & JH-HLM score  - Consult OT/PT to assist with strengthening/mobility   - Keep Call bell within reach  - Keep bed low and locked with side rails adjusted as appropriate  - Keep care items and personal belongings within reach  - Initiate and maintain comfort rounds  - Make Fall Risk Sign visible to staff  - Apply yellow socks and bracelet for high fall risk patients  - Consider moving patient to room near nurses station  Outcome: Progressing  Goal: Maintain or return to baseline ADL function  Description: INTERVENTIONS:  -  Assess patient's ability to carry out ADLs; assess patient's baseline for ADL function and identify physical deficits which impact ability to perform ADLs (bathing, care of mouth/teeth, toileting, grooming, dressing, etc.)  - Assess/evaluate cause of self-care deficits   - Assess range of motion  - Assess patient's mobility; develop plan if impaired  - Assess patient's need for assistive devices and provide as appropriate  - Encourage maximum independence but intervene and supervise when necessary  - Involve family in performance of ADLs  - Assess for home care needs following discharge   - Consider OT consult to assist with ADL evaluation and planning for discharge  - Provide patient education as appropriate  - Monitor functional capacity and physical performance, use of AM PAC & JH-HLM   - Monitor gait, balance and fatigue with ambulation    Outcome: Progressing  Goal: Maintains/Returns to pre admission functional level  Description: INTERVENTIONS:  - Perform AM-PAC 6 Click Basic Mobility/ Daily Activity assessment daily.  - Set and communicate daily mobility goal to care team and patient/family/caregiver.   - Collaborate with rehabilitation services on mobility goals if consulted  - Perform Range of Motion 3 times a day.  - Reposition  patient every 2 hours.  - Dangle patient 3 times a day  - Stand patient 3 times a day  - Ambulate patient 3 times a day  - Out of bed to chair 3 times a day   - Out of bed for meals 3 times a day  - Out of bed for toileting  - Record patient progress and toleration of activity level   Outcome: Progressing     Problem: DISCHARGE PLANNING  Goal: Discharge to home or other facility with appropriate resources  Description: INTERVENTIONS:  - Identify barriers to discharge w/patient and caregiver  - Arrange for needed discharge resources and transportation as appropriate  - Identify discharge learning needs (meds, wound care, etc.)  - Arrange for interpretive services to assist at discharge as needed  - Refer to Case Management Department for coordinating discharge planning if the patient needs post-hospital services based on physician/advanced practitioner order or complex needs related to functional status, cognitive ability, or social support system  Outcome: Progressing     Problem: Knowledge Deficit  Goal: Patient/family/caregiver demonstrates understanding of disease process, treatment plan, medications, and discharge instructions  Description: Complete learning assessment and assess knowledge base.  Interventions:  - Provide teaching at level of understanding  - Provide teaching via preferred learning methods  Outcome: Progressing

## 2025-05-26 NOTE — PROGRESS NOTES
Progress Note - Hospitalist   Name: Nicoáls Balderrama 45 y.o. male I MRN: [id number]  Unit/Bed#: -01 I Date of Admission: 5/22/2025   Date of Service: 5/26/2025 I Hospital Day: 4    Assessment & Plan  Cellulitis of right lower extremity  Presents with 2 days of fever, flulike symptoms and right lower extremity erythema.  Rx'd Keflex without improvement  History of recurrent right lower extremity cellulitis with previous hospitalizations and successful treatment with Ancef  Works as a , no history of MRSA  We will check MRSA culture swab for completeness  Trend fever curve and leukocytosis  Procalcitonin elevated  Blood cultures pending  X-ray right lower :without obvious gas formation  If no improvement over next 48 hours consider CT lower extremity   After discussion is done with infectious disease, will continue IV antibiotic and reassess.  Also patient started diuretics therapy to facilitate the recovery.  Can start using compressions.  Patient right lower extremity still significantly swollen and red, we will proceed with CT scan of lower extremity to rule out abscess, as well as follow-up venous duplex      Sepsis (HCC)  Meeting criteria with tachycardia, leukocytosis with source of right lower extremity cellulitis source (recurrent in nature)  Blood cultures pending  Continue hide is off IV antibiotic per ID discussion  Morbid obesity (HCC)  BMI 53  Morbid obesity complicating all aspects of healthcare  Requires intensive lifestyle modification   Essential hypertension  Continue PTA lisinopril, furosemide, spironolactone  Trend blood pressures    VTE Pharmacologic Prophylaxis:   Moderate Risk (Score 3-4) - Pharmacological DVT Prophylaxis Ordered: enoxaparin (Lovenox).    Mobility:   Basic Mobility Inpatient Raw Score: 24  JH-HLM Goal: 8: Walk 250 feet or more  JH-HLM Achieved: 8: Walk 250 feet ot more  JH-HLM Goal achieved. Continue to encourage appropriate mobility.    Patient Centered  Rounds: I performed bedside rounds with nursing staff today.   Discussions with Specialists or Other Care Team Provider: None    Education and Discussions with Family / Patient: With patient.     Current Length of Stay: 4 day(s)  Current Patient Status: Inpatient   Certification Statement: The patient will continue to require additional inpatient hospital stay due to lower extremity cellulitis still occurring IV antibiotic  Discharge Plan: Anticipate discharge in 24-48 hrs to home.    Code Status: Level 1 - Full Code    Subjective   Seen and evaluated during the run.  Resting comfortably.  Still having pain in right lower extremity with redness but improving slowly.    Objective :  Temp:  [97.3 °F (36.3 °C)-97.7 °F (36.5 °C)] 97.3 °F (36.3 °C)  HR:  [81-94] 81  BP: (138-149)/(91-96) 149/96  Resp:  [18] 18  SpO2:  [95 %-97 %] 95 %  O2 Device: None (Room air)    Body mass index is 53.16 kg/m².     Input and Output Summary (last 24 hours):     Intake/Output Summary (Last 24 hours) at 5/26/2025 0859  Last data filed at 5/26/2025 0214  Gross per 24 hour   Intake 1131.67 ml   Output --   Net 1131.67 ml       Physical Exam  Vitals and nursing note reviewed.   Constitutional:       Appearance: He is obese. He is not ill-appearing or diaphoretic.     Eyes:      Extraocular Movements: Extraocular movements intact.      Conjunctiva/sclera: Conjunctivae normal.      Pupils: Pupils are equal, round, and reactive to light.       Cardiovascular:      Rate and Rhythm: Normal rate.      Heart sounds: No murmur heard.     No friction rub. No gallop.   Pulmonary:      Effort: Pulmonary effort is normal. No respiratory distress.      Breath sounds: No stridor. No wheezing.     Musculoskeletal:         General: Swelling (right lower extremity) and tenderness (right lower extremity) present.      Left lower leg: No edema.     Skin:     Findings: Erythema present.     Neurological:      Mental Status: He is alert and oriented to person,  place, and time.      Cranial Nerves: No cranial nerve deficit.      Sensory: No sensory deficit.      Motor: No weakness.      Coordination: Coordination normal.           Lines/Drains:              Lab Results: I have reviewed the following results:   Results from last 7 days   Lab Units 05/25/25  0511   WBC Thousand/uL 8.29   HEMOGLOBIN g/dL 14.5   HEMATOCRIT % 43.9   PLATELETS Thousands/uL 232   SEGS PCT % 66   LYMPHO PCT % 21   MONO PCT % 9   EOS PCT % 2     Results from last 7 days   Lab Units 05/25/25  0511   SODIUM mmol/L 136   POTASSIUM mmol/L 3.6   CHLORIDE mmol/L 100   CO2 mmol/L 27   BUN mg/dL 13   CREATININE mg/dL 0.76   ANION GAP mmol/L 9   CALCIUM mg/dL 8.8   ALBUMIN g/dL 3.6   TOTAL BILIRUBIN mg/dL 0.99   ALK PHOS U/L 80   ALT U/L 14   AST U/L 19   GLUCOSE RANDOM mg/dL 103         Results from last 7 days   Lab Units 05/23/25  0741   POC GLUCOSE mg/dl 130         Results from last 7 days   Lab Units 05/23/25  0537 05/22/25  1825   LACTIC ACID mmol/L  --  1.2   PROCALCITONIN ng/ml 5.12* 6.78*       Recent Cultures (last 7 days):   Results from last 7 days   Lab Units 05/22/25  1941 05/22/25  1825   BLOOD CULTURE  No Growth at 48 hrs. No Growth at 72 hrs.       Imaging Results Review: No pertinent imaging studies reviewed.  Other Study Results Review: No additional pertinent studies reviewed.    Last 24 Hours Medication List:     Current Facility-Administered Medications:     acetaminophen (TYLENOL) tablet 975 mg, Q8H PRN    ceFAZolin (ANCEF) IVPB (premix in dextrose) 2,000 mg 50 mL, Q6H, Last Rate: 2,000 mg (05/26/25 0829)    enoxaparin (LOVENOX) subcutaneous injection 60 mg, BID    furosemide (LASIX) injection 40 mg, Daily    lisinopril (ZESTRIL) tablet 20 mg, Daily    morphine injection 4 mg, Q4H PRN    oxyCODONE (ROXICODONE) IR tablet 2.5 mg, Q6H PRN    oxyCODONE (ROXICODONE) IR tablet 5 mg, Q6H PRN    spironolactone (ALDACTONE) tablet 25 mg, Daily    Administrative Statements   Today, Patient Was  Seen By: Ashu Adler MD      **Please Note: This note may have been constructed using a voice recognition system.**

## 2025-05-26 NOTE — ASSESSMENT & PLAN NOTE
Presents with 2 days of fever, flulike symptoms and right lower extremity erythema.  Rx'd Keflex without improvement  History of recurrent right lower extremity cellulitis with previous hospitalizations and successful treatment with Ancef  Works as a , no history of MRSA  We will check MRSA culture swab for completeness  Trend fever curve and leukocytosis  Procalcitonin elevated  Blood cultures pending  X-ray right lower :without obvious gas formation  If no improvement over next 48 hours consider CT lower extremity   After discussion is done with infectious disease, will continue IV antibiotic and reassess.  Also patient started diuretics therapy to facilitate the recovery.  Can start using compressions.  Patient right lower extremity still significantly swollen and red, we will proceed with CT scan of lower extremity to rule out abscess, as well as follow-up venous duplex

## 2025-05-26 NOTE — PLAN OF CARE
Problem: Potential for Falls  Goal: Patient will remain free of falls  Description: INTERVENTIONS:  - Educate patient/family on patient safety including physical limitations  - Instruct patient to call for assistance with activity   - Consider consulting OT/PT to assist with strengthening/mobility based on AM PAC & JH-HLM score  - Consult OT/PT to assist with strengthening/mobility   - Keep Call bell within reach  - Keep bed low and locked with side rails adjusted as appropriate  - Keep care items and personal belongings within reach  - Initiate and maintain comfort rounds  - Make Fall Risk Sign visible to staff  - Offer Toileting every 2 Hours, in advance of need  - Initiate/Maintain alarm  - Obtain necessary fall risk management equipment  - Apply yellow socks and bracelet for high fall risk patients  - Consider moving patient to room near nurses station  Outcome: Progressing     Problem: Nutrition/Hydration-ADULT  Goal: Nutrient/Hydration intake appropriate for improving, restoring or maintaining nutritional needs  Description: Monitor and assess patient's nutrition/hydration status for malnutrition. Collaborate with interdisciplinary team and initiate plan and interventions as ordered.  Monitor patient's weight and dietary intake as ordered or per policy. Utilize nutrition screening tool and intervene as necessary. Determine patient's food preferences and provide high-protein, high-caloric foods as appropriate.     INTERVENTIONS:  - Monitor oral intake, urinary output, labs, and treatment plans  - Assess nutrition and hydration status and recommend course of action  - Evaluate amount of meals eaten  - Assist patient with eating if necessary   - Allow adequate time for meals  - Recommend/ encourage appropriate diets, oral nutritional supplements, and vitamin/mineral supplements  - Order, calculate, and assess calorie counts as needed  - Recommend, monitor, and adjust tube feedings and TPN/PPN based on assessed  needs  - Assess need for intravenous fluids  - Provide specific nutrition/hydration education as appropriate  - Include patient/family/caregiver in decisions related to nutrition  Outcome: Progressing     Problem: PAIN - ADULT  Goal: Verbalizes/displays adequate comfort level or baseline comfort level  Description: Interventions:  - Encourage patient to monitor pain and request assistance  - Assess pain using appropriate pain scale  - Administer analgesics as ordered based on type and severity of pain and evaluate response  - Implement non-pharmacological measures as appropriate and evaluate response  - Consider cultural and social influences on pain and pain management  - Notify physician/advanced practitioner if interventions unsuccessful or patient reports new pain  - Educate patient/family on pain management process including their role and importance of  reporting pain   - Provide non-pharmacologic/complimentary pain relief interventions  Outcome: Progressing     Problem: INFECTION - ADULT  Goal: Absence or prevention of progression during hospitalization  Description: INTERVENTIONS:  - Assess and monitor for signs and symptoms of infection  - Monitor lab/diagnostic results  - Monitor all insertion sites, i.e. indwelling lines, tubes, and drains  - Monitor endotracheal if appropriate and nasal secretions for changes in amount and color  - [place] appropriate cooling/warming therapies per order  - Administer medications as ordered  - Instruct and encourage patient and family to use good hand hygiene technique  - Identify and instruct in appropriate isolation precautions for identified infection/condition  Outcome: Progressing  Goal: Absence of fever/infection during neutropenic period  Description: INTERVENTIONS:  - Monitor WBC  - Perform strict hand hygiene  - Limit to healthy visitors only  - No plants, dried, fresh or silk flowers with valle in patient room  Outcome: Progressing     Problem: SAFETY  ADULT  Goal: Patient will remain free of falls  Description: INTERVENTIONS:  - Educate patient/family on patient safety including physical limitations  - Instruct patient to call for assistance with activity   - Consider consulting OT/PT to assist with strengthening/mobility based on AM PAC & JH-HLM score  - Consult OT/PT to assist with strengthening/mobility   - Keep Call bell within reach  - Keep bed low and locked with side rails adjusted as appropriate  - Keep care items and personal belongings within reach  - Initiate and maintain comfort rounds  - Make Fall Risk Sign visible to staff  - Offer Toileting every 2 Hours, in advance of need  - Initiate/Maintain alarm  - Obtain necessary fall risk management equipment  - Apply yellow socks and bracelet for high fall risk patients  - Consider moving patient to room near nurses station  Outcome: Progressing  Goal: Maintain or return to baseline ADL function  Description: INTERVENTIONS:  -  Assess patient's ability to carry out ADLs; assess patient's baseline for ADL function and identify physical deficits which impact ability to perform ADLs (bathing, care of mouth/teeth, toileting, grooming, dressing, etc.)  - Assess/evaluate cause of self-care deficits   - Assess range of motion  - Assess patient's mobility; develop plan if impaired  - Assess patient's need for assistive devices and provide as appropriate  - Encourage maximum independence but intervene and supervise when necessary  - Involve family in performance of ADLs  - Assess for home care needs following discharge   - Consider OT consult to assist with ADL evaluation and planning for discharge  - Provide patient education as appropriate  - Monitor functional capacity and physical performance, use of AM PAC & JH-HLM   - Monitor gait, balance and fatigue with ambulation    Outcome: Progressing  Goal: Maintains/Returns to pre admission functional level  Description: INTERVENTIONS:  - Perform AM-PAC 6 Click Basic  Mobility/ Daily Activity assessment daily.  - Set and communicate daily mobility goal to care team and patient/family/caregiver.   - Collaborate with rehabilitation services on mobility goals if consulted  - Perform Range of Motion - times a day.  - Reposition patient every - hours.  - Dangle patient - times a day  - Stand patient - times a day  - Ambulate patient - times a day  - Out of bed to chair - times a day   - Out of bed for meals - times a day  - Out of bed for toileting  - Record patient progress and toleration of activity level   Outcome: Progressing     Problem: DISCHARGE PLANNING  Goal: Discharge to home or other facility with appropriate resources  Description: INTERVENTIONS:  - Identify barriers to discharge w/patient and caregiver  - Arrange for needed discharge resources and transportation as appropriate  - Identify discharge learning needs (meds, wound care, etc.)  - Arrange for interpretive services to assist at discharge as needed  - Refer to Case Management Department for coordinating discharge planning if the patient needs post-hospital services based on physician/advanced practitioner order or complex needs related to functional status, cognitive ability, or social support system  Outcome: Progressing     Problem: Knowledge Deficit  Goal: Patient/family/caregiver demonstrates understanding of disease process, treatment plan, medications, and discharge instructions  Description: Complete learning assessment and assess knowledge base.  Interventions:  - Provide teaching at level of understanding  - Provide teaching via preferred learning methods  Outcome: Progressing

## 2025-05-27 ENCOUNTER — APPOINTMENT (INPATIENT)
Dept: NON INVASIVE DIAGNOSTICS | Facility: HOSPITAL | Age: 45
DRG: 872 | End: 2025-05-27
Payer: COMMERCIAL

## 2025-05-27 LAB — BACTERIA BLD CULT: NORMAL

## 2025-05-27 PROCEDURE — 93971 EXTREMITY STUDY: CPT | Performed by: SURGERY

## 2025-05-27 PROCEDURE — 99232 SBSQ HOSP IP/OBS MODERATE 35: CPT | Performed by: FAMILY MEDICINE

## 2025-05-27 PROCEDURE — 93971 EXTREMITY STUDY: CPT

## 2025-05-27 RX ORDER — FUROSEMIDE 10 MG/ML
40 INJECTION INTRAMUSCULAR; INTRAVENOUS
Status: DISCONTINUED | OUTPATIENT
Start: 2025-05-27 | End: 2025-05-28

## 2025-05-27 RX ADMIN — CEFAZOLIN SODIUM 2000 MG: 2 SOLUTION INTRAVENOUS at 13:29

## 2025-05-27 RX ADMIN — OXYCODONE HYDROCHLORIDE 5 MG: 5 TABLET ORAL at 20:41

## 2025-05-27 RX ADMIN — SPIRONOLACTONE 25 MG: 25 TABLET, FILM COATED ORAL at 08:25

## 2025-05-27 RX ADMIN — FUROSEMIDE 40 MG: 10 INJECTION, SOLUTION INTRAMUSCULAR; INTRAVENOUS at 08:25

## 2025-05-27 RX ADMIN — FUROSEMIDE 40 MG: 10 INJECTION, SOLUTION INTRAMUSCULAR; INTRAVENOUS at 15:32

## 2025-05-27 RX ADMIN — CEFAZOLIN SODIUM 2000 MG: 2 SOLUTION INTRAVENOUS at 07:54

## 2025-05-27 RX ADMIN — LISINOPRIL 20 MG: 20 TABLET ORAL at 08:25

## 2025-05-27 RX ADMIN — OXYCODONE HYDROCHLORIDE 5 MG: 5 TABLET ORAL at 11:37

## 2025-05-27 RX ADMIN — CEFAZOLIN SODIUM 2000 MG: 2 SOLUTION INTRAVENOUS at 20:33

## 2025-05-27 RX ADMIN — CEFAZOLIN SODIUM 2000 MG: 2 SOLUTION INTRAVENOUS at 01:44

## 2025-05-27 NOTE — ASSESSMENT & PLAN NOTE
Meeting criteria with tachycardia, leukocytosis with source of right lower extremity cellulitis source (recurrent in nature)  Blood cultures neg  Continue iv ancef

## 2025-05-27 NOTE — UTILIZATION REVIEW
Continued Stay Review    Date: 5/27/25                           Current Patient Class: Inpatient  Current Level of Care: MS    HPI:45 y.o. male initially admitted on 5/22/25    Current Diagnosis: Cellulitis of right lower extremity  / Sepsis     Assessment/Plan:   Patient complaining of pain redness and swelling of his right leg. He states is a little better compared to the day he came in but is still quite tender and swollen. Exam: Patient right lower extremity still significantly swollen and red - below the knee swelling redness noted extending up to above the ankles. After discussion with infectious disease, will continue IV antibiotic (ancef) and reassess.  Also patient  diuretics therapy increased to facilitate  recovery and help decrease edema.  Can start using compressions.  Plan: cont iv abx; cont iv lasix; monitor labs; pain control (see below); f/u venous duplex        Medications:   Scheduled Medications:  cefazolin, 2,000 mg, Intravenous, Q6H  enoxaparin, 60 mg, Subcutaneous, BID  furosemide, 40 mg, Intravenous, BID (diuretic)  lisinopril, 20 mg, Oral, Daily  spironolactone, 25 mg, Oral, Daily      Continuous IV Infusions: None       PRN Meds:  acetaminophen, 975 mg, Oral, Q8H PRN  morphine injection, 4 mg, Intravenous, Q4H PRN  oxyCODONE, 2.5 mg, Oral, Q6H PRN  oxyCODONE, 5 mg, Oral, Q6H PRN: 5/27 x1 so far       Discharge Plan: TBD    Vital Signs (last 3 days)       Date/Time Temp Pulse Resp BP MAP (mmHg) SpO2 O2 Device Patient Position - Orthostatic VS Julianne Coma Scale Score Pain    05/27/25 1137 -- -- -- -- -- -- -- -- -- 8    05/27/25 0900 -- -- -- -- -- -- None (Room air) -- 15 No Pain    05/27/25 07:11:47 97.5 °F (36.4 °C) 76 18 136/91 106 98 % -- -- -- --    05/26/25 21:52:08 97.3 °F (36.3 °C) 88 18 143/93 110 96 % -- -- -- --    05/26/25 2100 -- -- -- -- -- -- None (Room air) -- 15 --    05/26/25 2034 -- -- -- -- -- -- -- -- -- 7    05/26/25 15:22:39 97.2 °F (36.2 °C) 91 17 145/95 112 97 % --  -- -- --    05/26/25 0900 -- 87 -- -- -- 96 % None (Room air) -- -- 6    05/26/25 0834 -- -- -- -- -- -- -- -- 15 -- 05/26/25 07:37:12 97.3 °F (36.3 °C) 81 18 149/96 114 95 % -- -- -- --    05/25/25 21:13:41 97.7 °F (36.5 °C) 94 -- 144/93 110 95 % -- Lying -- --    05/25/25 2111 -- -- -- -- -- 95 % None (Room air) -- -- 8 05/25/25 2005 -- -- -- -- -- 95 % None (Room air) -- -- --    05/25/25 14:12:54 97.3 °F (36.3 °C) 93 18 138/91 107 97 % -- -- -- --    05/25/25 1412 -- -- -- -- -- -- -- -- -- 7 05/25/25 1006 -- -- -- -- -- -- -- -- -- 7 05/25/25 08:10:32 97.9 °F (36.6 °C) 82 18 139/97 111 98 % -- -- -- --    05/25/25 0800 -- -- -- -- -- 96 % None (Room air) -- 15 --    05/25/25 0214 -- -- -- -- -- -- -- -- -- 7 05/24/25 23:52:39 97.3 °F (36.3 °C) 99 18 142/97 112 96 % -- -- -- --    05/24/25 1948 -- -- -- -- -- 98 % None (Room air) -- -- 2    05/24/25 1721 -- -- -- -- -- -- -- -- -- 7 05/24/25 1547 -- -- -- -- -- -- -- -- -- 7 05/24/25 15:17:42 97.5 °F (36.4 °C) 89 18 145/97 113 97 % -- -- -- --    05/24/25 0857 -- -- -- -- -- -- -- -- -- 7    05/24/25 0845 -- -- -- -- -- -- None (Room air) -- 15 --    05/24/25 07:32:12 97.7 °F (36.5 °C) 80 18 148/99 115 96 % -- -- -- --    05/24/25 0526 -- -- -- -- -- -- -- -- -- 8    05/24/25 0203 -- -- -- -- -- -- -- -- -- 4          Weight (last 2 days)       Date/Time Weight    05/27/25 1137 171 (377.4)            Pertinent Labs/Diagnostic Results:   Radiology:  CT lower extremity w contrast right   Final Interpretation by Bradley Landon Kocher, MD (05/26 1302)      Diffuse skin thickening and subcutaneous fat stranding suggestive of right lower extremity cellulitis in the appropriate setting. No discrete abscess or evidence of deep intramuscular infection.         Resident: Neftali Fan I, the attending radiologist, have reviewed the images and agree with the final report above.      Workstation performed: [id number]            VAS VENOUS DUPLEX  -LOWER LIMB UNILATERAL    (Results Pending)       Results from last 7 days   Lab Units 05/25/25  0511 05/24/25  0514 05/23/25  0807 05/22/25  1825   WBC Thousand/uL 8.29 9.21 10.70* 13.96*   HEMOGLOBIN g/dL 14.5 13.8 13.7 15.1   HEMATOCRIT % 43.9 42.4 40.2 45.7   PLATELETS Thousands/uL 232 191 182 203   TOTAL NEUT ABS Thousands/µL 5.49 6.54  --  12.17*        Results from last 7 days   Lab Units 05/25/25  0511 05/24/25  0514 05/23/25  0537 05/22/25  1825   SODIUM mmol/L 136 137 134* 135   POTASSIUM mmol/L 3.6 3.3* 3.7 3.7   CHLORIDE mmol/L 100 102 103 101   CO2 mmol/L 27 28 24 26   ANION GAP mmol/L 9 7 7 8   BUN mg/dL 13 12 15 16   CREATININE mg/dL 0.76 0.85 0.91 0.96   EGFR ml/min/1.73sq m 109 104 100 94   CALCIUM mg/dL 8.8 8.7 8.6 9.1   MAGNESIUM mg/dL  --   --  2.0  --      Results from last 7 days   Lab Units 05/25/25  0511 05/24/25  0514 05/22/25  1825   AST U/L 19 17 32   ALT U/L 14 18 34   ALK PHOS U/L 80 76 80   TOTAL PROTEIN g/dL 7.1 6.8 7.3   ALBUMIN g/dL 3.6 3.3* 3.8   TOTAL BILIRUBIN mg/dL 0.99 1.02* 1.69*     Results from last 7 days   Lab Units 05/25/25  0511 05/24/25  0514 05/23/25  0537 05/22/25  1825   GLUCOSE RANDOM mg/dL 103 102 95 96     Results from last 7 days   Lab Units 05/22/25  1941 05/22/25  1825   BLOOD CULTURE  No Growth After 4 Days. No Growth After 4 Days.       Network Utilization Review Department  ATTENTION: Please call with any questions or concerns to [phone] and carefully listen to the prompts so that you are directed to the right person. All voicemails are confidential.   For Discharge needs, contact Care Management DC Support Team at [phone] opt. 2  Send all requests for admission clinical reviews, approved or denied determinations and any other requests to dedicated fax number below belonging to the campus where the patient is receiving treatment. List of dedicated fax numbers for the Facilities:  FACILITY NAME UR FAX NUMBER   ADMISSION DENIALS  (Administrative/Medical Necessity) [phone]   DISCHARGE SUPPORT TEAM (NETWORK) [phone]   PARENT CHILD HEALTH (Maternity/NICU/Pediatrics) [phone]   [facility] [phone]   [facility] [phone]   [facility] [phone]   [facility] [phone]   [facility] [phone]   [facility] [phone]   [facility] [phone]   [facility] [phone]   [facility] [phone]   [facility] [phone]   [facility] [phone]   [facility] [phone]

## 2025-05-27 NOTE — PROGRESS NOTES
Progress Note - Infectious Disease   Name: Nicolás Balderrama 45 y.o. male I MRN: [id number]  Unit/Bed#: -01 I Date of Admission: 5/22/2025   Date of Service: 5/27/2025 I Hospital Day: 5      Administrative Statements   VIRTUAL CARE DOCUMENTATION:     1. This service was provided via Telemedicine using [employer]     2. Parties in the room with patient during teleconsult Patient only    3. Confidentiality My office door was closed     4. Participants No one else was in the room    5. Patient acknowledged consent and understanding of privacy and security of the  Telemedicine consult. I informed the patient that I have reviewed their record in Epic and presented the opportunity for them to ask any questions regarding the visit today.  The patient agreed to participate.    6. I have spent a total time of 50 minutes in caring for this patient on the day of the visit/encounter including Diagnostic results, Prognosis, Risks and benefits of tx options, Importance of tx compliance, Risk factor reductions, Impressions, Counseling / Coordination of care, Documenting in the medical record, Reviewing/placing orders in the medical record (including tests, medications, and/or procedures), Obtaining or reviewing history  , and Communicating with other healthcare professionals , not including the time spent for establishing the audio/video connection.       Assessment & Plan  Sepsis (HCC)  Evidenced by fever at home, tachycardia, leukocytosis  Source of sepsis is recurrent right lower extremity cellulitis  Clinically improving, afebrile and hemodynamically stable without leukocytosis    Antibiotics and additional management as below   Check daily CBC, CMP while inpatient to monitor for any evolving antibiotic toxicity or treatment failure   Continue supportive care, monitor clinical course    Cellulitis of right lower extremity  Right lower extremity cellulitis in the setting of morbid obesity, chronic venous edema and job  requirements of prolonged standing/immobilization  Third hospitalization in 3 years.   Suspect strep spp. No clinical or CT evidence of abscess.  Anticipate slow improvement due to extent of edema       Continue cefazolin, adjusted dose to 2 q6h   Follow-up blood cultures   Recommend aggressive iv diuretics as tolerated by renal function and BP   Recommend compression wrap   Continue serial extremity exam   Would recommend consideration of penicillin prophylaxis for 6-12 months until underlying risk factors can be addressed but patient currently is declining due to risk of adverse effects including c diff   As an outpatient would recommend obtaining dopplers with venous reflux to assess for any venous insufficiency and referral to vascular surgery  Discussed importance of compliance as an outpatient with lower extremity skin care, management of venous edema with oral diuretics, compression stockings, limb elevation  Recommend weight loss  Discussed natural history of cellulitis and discussed with patient that he is at risk for recurrent cellulitis/bacteremia if underlying risk factors cannot be modified    Morbid obesity (HCC)  Risk factor for recurrent infection     Strongly recommend patient follow-up with bariatric center for lifestyle modification, weight loss        Antibiotics:  cefazolin    Subjective   Patient has no fever, chills, sweats; no nausea, vomiting, diarrhea; no cough, shortness of breath; no pain. No new symptoms. Patient notes persistent leg swelling, improving redness    Objective :  Temp:  [97.2 °F (36.2 °C)-97.5 °F (36.4 °C)] 97.5 °F (36.4 °C)  HR:  [76-91] 76  BP: (136-145)/(91-95) 136/91  Resp:  [17-18] 18  SpO2:  [96 %-98 %] 98 %  O2 Device: None (Room air)    General:  No acute distress  Psychiatric:  Awake and alert  Pulmonary:  Normal respiratory excursion without accessory muscle use  Abdomen:  Soft, nontender  Extremities:  2+ RLE edema  Skin:  Circumferential erythema of RLE      Lab  Results: I have reviewed the following results:  Results from last 7 days   Lab Units 05/25/25  0511 05/24/25  0514 05/23/25  0807   WBC Thousand/uL 8.29 9.21 10.70*   HEMOGLOBIN g/dL 14.5 13.8 13.7   PLATELETS Thousands/uL 232 191 182     Results from last 7 days   Lab Units 05/25/25  0511 05/24/25  0514 05/23/25  0537 05/22/25  1825   SODIUM mmol/L 136 137 134* 135   POTASSIUM mmol/L 3.6 3.3* 3.7 3.7   CHLORIDE mmol/L 100 102 103 101   CO2 mmol/L 27 28 24 26   BUN mg/dL 13 12 15 16   CREATININE mg/dL 0.76 0.85 0.91 0.96   EGFR ml/min/1.73sq m 109 104 100 94   CALCIUM mg/dL 8.8 8.7 8.6 9.1   AST U/L 19 17  --  32   ALT U/L 14 18  --  34   ALK PHOS U/L 80 76  --  80   ALBUMIN g/dL 3.6 3.3*  --  3.8     Results from last 7 days   Lab Units 05/22/25  1941 05/22/25  1825   BLOOD CULTURE  No Growth After 4 Days. No Growth After 4 Days.     Results from last 7 days   Lab Units 05/23/25  0537 05/22/25  1825   PROCALCITONIN ng/ml 5.12* 6.78*

## 2025-05-27 NOTE — PROGRESS NOTES
Progress Note - Hospitalist   Name: Nicolás Balderrama 45 y.o. male I MRN: [id number]  Unit/Bed#: -01 I Date of Admission: 5/22/2025   Date of Service: 5/27/2025 I Hospital Day: 5    Assessment & Plan  Cellulitis of right lower extremity  Presents with 2 days of fever, flulike symptoms and right lower extremity erythema.  Rx'd Keflex without improvement  History of recurrent right lower extremity cellulitis with previous hospitalizations and successful treatment with Ancef  Works as a , no history of MRSA  We will check MRSA culture swab for completeness  Trend fever curve and leukocytosis  Procalcitonin elevated  Blood cultures neg  X-ray right lower :without obvious gas formation  Ct rt le Diffuse skin thickening and subcutaneous fat stranding suggestive of right lower extremity cellulitis in the appropriate setting. No discrete abscess or evidence of deep intramuscular infection.   After discussion with infectious disease, will continue IV antibiotic (ancef) and reassess.  Also patient  diuretics therapy increased to facilitate  recovery and help decrease edema.  Can start using compressions.  Patient right lower extremity still significantly swollen and red, venous duplex ordered for today      Sepsis (HCC)  Meeting criteria with tachycardia, leukocytosis with source of right lower extremity cellulitis source (recurrent in nature)  Blood cultures neg  Continue iv ancef  Morbid obesity (HCC)  BMI 53  Morbid obesity complicating all aspects of healthcare  Requires intensive lifestyle modification   Essential hypertension  Continue PTA lisinopril, furosemide, spironolactone  blood pressures controlled    VTE Pharmacologic Prophylaxis:   Moderate Risk (Score 3-4) - Pharmacological DVT Prophylaxis Ordered: enoxaparin (Lovenox).    Mobility:   Basic Mobility Inpatient Raw Score: 24  JH-HLM Goal: 8: Walk 250 feet or more  JH-HLM Achieved: 8: Walk 250 feet ot more  JH-HLM Goal achieved. Continue to  encourage appropriate mobility.    Patient Centered Rounds: I performed bedside rounds with nursing staff today.   Discussions with Specialists or Other Care Team Provider: oriana inf disease    Education and Discussions with Family / Patient: will update    Current Length of Stay: 5 day(s)  Current Patient Status: Inpatient   Certification Statement: The patient will continue to require additional inpatient hospital stay due to right leg cellulitis  Discharge Plan: Anticipate discharge in 24-48 hrs to home.    Code Status: Level 1 - Full Code    Subjective   Patient complaining of pain redness and swelling of his right leg.  He states is a little better compared to the day he came in but is still quite tender and swollen    Objective :  Temp:  [97.2 °F (36.2 °C)-97.5 °F (36.4 °C)] 97.5 °F (36.4 °C)  HR:  [76-91] 76  BP: (136-145)/(91-95) 136/91  Resp:  [17-18] 18  SpO2:  [96 %-98 %] 98 %  O2 Device: None (Room air)    Body mass index is 53.16 kg/m².     Input and Output Summary (last 24 hours):     Intake/Output Summary (Last 24 hours) at 5/27/2025 1211  Last data filed at 5/26/2025 2029  Gross per 24 hour   Intake 120 ml   Output --   Net 120 ml       Physical Exam  Vitals and nursing note reviewed.   Constitutional:       Appearance: Normal appearance. He is obese.   HENT:      Head: Normocephalic and atraumatic.      Right Ear: External ear normal.      Left Ear: External ear normal.      Nose: Nose normal.      Mouth/Throat:      Pharynx: Oropharynx is clear.     Eyes:      Pupils: Pupils are equal, round, and reactive to light.       Cardiovascular:      Rate and Rhythm: Normal rate and regular rhythm.      Heart sounds: Normal heart sounds.   Pulmonary:      Effort: Pulmonary effort is normal.      Breath sounds: Normal breath sounds.   Abdominal:      General: Bowel sounds are normal.      Palpations: Abdomen is soft.      Tenderness: There is no abdominal tenderness.     Musculoskeletal:         General: Normal  range of motion.      Cervical back: Normal range of motion and neck supple.      Comments: Right leg below the knee swelling redness noted extending up to above the ankles     Skin:     General: Skin is warm and dry.      Capillary Refill: Capillary refill takes less than 2 seconds.     Neurological:      General: No focal deficit present.      Mental Status: He is alert and oriented to person, place, and time.     Psychiatric:         Mood and Affect: Mood normal.           Lines/Drains:              Lab Results: I have reviewed the following results:   Results from last 7 days   Lab Units 05/25/25  0511   WBC Thousand/uL 8.29   HEMOGLOBIN g/dL 14.5   HEMATOCRIT % 43.9   PLATELETS Thousands/uL 232   SEGS PCT % 66   LYMPHO PCT % 21   MONO PCT % 9   EOS PCT % 2     Results from last 7 days   Lab Units 05/25/25  0511   SODIUM mmol/L 136   POTASSIUM mmol/L 3.6   CHLORIDE mmol/L 100   CO2 mmol/L 27   BUN mg/dL 13   CREATININE mg/dL 0.76   ANION GAP mmol/L 9   CALCIUM mg/dL 8.8   ALBUMIN g/dL 3.6   TOTAL BILIRUBIN mg/dL 0.99   ALK PHOS U/L 80   ALT U/L 14   AST U/L 19   GLUCOSE RANDOM mg/dL 103         Results from last 7 days   Lab Units 05/23/25  0741   POC GLUCOSE mg/dl 130         Results from last 7 days   Lab Units 05/23/25  0537 05/22/25  1825   LACTIC ACID mmol/L  --  1.2   PROCALCITONIN ng/ml 5.12* 6.78*       Recent Cultures (last 7 days):   Results from last 7 days   Lab Units 05/22/25  1941 05/22/25  1825   BLOOD CULTURE  No Growth After 4 Days. No Growth After 4 Days.       Imaging Results Review: I reviewed radiology reports from this admission including: ct rt lower extremity and xray(s).  Other Study Results Review: EKG was reviewed.     Last 24 Hours Medication List:     Current Facility-Administered Medications:     acetaminophen (TYLENOL) tablet 975 mg, Q8H PRN    ceFAZolin (ANCEF) IVPB (premix in dextrose) 2,000 mg 50 mL, Q6H, Last Rate: 2,000 mg (05/27/25 0754)    enoxaparin (LOVENOX) subcutaneous  injection 60 mg, BID    furosemide (LASIX) injection 40 mg, BID (diuretic)    lisinopril (ZESTRIL) tablet 20 mg, Daily    morphine injection 4 mg, Q4H PRN    oxyCODONE (ROXICODONE) IR tablet 2.5 mg, Q6H PRN    oxyCODONE (ROXICODONE) IR tablet 5 mg, Q6H PRN    spironolactone (ALDACTONE) tablet 25 mg, Daily    Administrative Statements   Today, Patient Was Seen By: Sobeida Warren MD      **Please Note: This note may have been constructed using a voice recognition system.**

## 2025-05-27 NOTE — ASSESSMENT & PLAN NOTE
Presents with 2 days of fever, flulike symptoms and right lower extremity erythema.  Rx'd Keflex without improvement  History of recurrent right lower extremity cellulitis with previous hospitalizations and successful treatment with Ancef  Works as a , no history of MRSA  We will check MRSA culture swab for completeness  Trend fever curve and leukocytosis  Procalcitonin elevated  Blood cultures neg  X-ray right lower :without obvious gas formation  Ct rt le Diffuse skin thickening and subcutaneous fat stranding suggestive of right lower extremity cellulitis in the appropriate setting. No discrete abscess or evidence of deep intramuscular infection.   After discussion with infectious disease, will continue IV antibiotic (ancef) and reassess.  Also patient  diuretics therapy increased to facilitate  recovery and help decrease edema.  Can start using compressions.  Patient right lower extremity still significantly swollen and red, venous duplex ordered for today

## 2025-05-27 NOTE — CASE MANAGEMENT
Case Management Discharge Planning Note    Patient name Nicolás Balderrama  Location /-01 MRN [id number]  : 1980 Date 2025       Current Admission Date: 2025  Current Admission Diagnosis:Sepsis (HCC)   Patient Active Problem List    Diagnosis Date Noted    Essential hypertension 2024    Right heart enlargement 2024    Abnormal echocardiogram 2024    Localized edema 2023    Leukocytosis 2023    BMI 50.0-59.9, adult (HCC) 2023    Cellulitis of right lower extremity 2019    Hypokalemia 2019    Morbid obesity (HCC) 2019    Sepsis ([facility]) 2019      LOS (days): 5  Geometric Mean LOS (GMLOS) (days): 3.5  Days to GMLOS:-1.3     OBJECTIVE:  Risk of Unplanned Readmission Score: 7.6         Current admission status: Inpatient   Preferred Pharmacy:   [id number] [address] [address]  Phone: [phone] Fax: [phone]    Primary Care Provider: KELLI Palacios    Primary Insurance: [facility]  Secondary Insurance:     DISCHARGE DETAILS:     Chart reviewed aware of medical management.  Pt continues with IV abx for cellulitis.   CM will continue to follow, current dc plan remains home tomorrow with oral abx.

## 2025-05-27 NOTE — ASSESSMENT & PLAN NOTE
Right lower extremity cellulitis in the setting of morbid obesity, chronic venous edema and job requirements of prolonged standing/immobilization  Third hospitalization in 3 years.   Suspect strep spp. No clinical or CT evidence of abscess.  Anticipate slow improvement due to extent of edema       Continue cefazolin, adjusted dose to 2 q6h   Follow-up blood cultures   Recommend aggressive iv diuretics as tolerated by renal function and BP   Recommend compression wrap   Continue serial extremity exam   Would recommend consideration of penicillin prophylaxis for 6-12 months until underlying risk factors can be addressed but patient currently is declining due to risk of adverse effects including c diff   As an outpatient would recommend obtaining dopplers with venous reflux to assess for any venous insufficiency and referral to vascular surgery  Discussed importance of compliance as an outpatient with lower extremity skin care, management of venous edema with oral diuretics, compression stockings, limb elevation  Recommend weight loss  Discussed natural history of cellulitis and discussed with patient that he is at risk for recurrent cellulitis/bacteremia if underlying risk factors cannot be modified

## 2025-05-27 NOTE — PLAN OF CARE
Problem: Potential for Falls  Goal: Patient will remain free of falls  Description: INTERVENTIONS:  - Educate patient/family on patient safety including physical limitations  - Instruct patient to call for assistance with activity   - Consider consulting OT/PT to assist with strengthening/mobility based on AM PAC & JH-HLM score  - Consult OT/PT to assist with strengthening/mobility   - Keep Call bell within reach  - Keep bed low and locked with side rails adjusted as appropriate  - Keep care items and personal belongings within reach  - Initiate and maintain comfort rounds  - Make Fall Risk Sign visible to staff  - Apply yellow socks and bracelet for high fall risk patients  - Consider moving patient to room near nurses station  Outcome: Progressing     Problem: Nutrition/Hydration-ADULT  Goal: Nutrient/Hydration intake appropriate for improving, restoring or maintaining nutritional needs  Description: Monitor and assess patient's nutrition/hydration status for malnutrition. Collaborate with interdisciplinary team and initiate plan and interventions as ordered.  Monitor patient's weight and dietary intake as ordered or per policy. Utilize nutrition screening tool and intervene as necessary. Determine patient's food preferences and provide high-protein, high-caloric foods as appropriate.     INTERVENTIONS:  - Monitor oral intake, urinary output, labs, and treatment plans  - Assess nutrition and hydration status and recommend course of action  - Evaluate amount of meals eaten  - Assist patient with eating if necessary   - Allow adequate time for meals  - Recommend/ encourage appropriate diets, oral nutritional supplements, and vitamin/mineral supplements  - Order, calculate, and assess calorie counts as needed  - Recommend, monitor, and adjust tube feedings and TPN/PPN based on assessed needs  - Assess need for intravenous fluids  - Provide specific nutrition/hydration education as appropriate  - Include  patient/family/caregiver in decisions related to nutrition  Outcome: Progressing     Problem: PAIN - ADULT  Goal: Verbalizes/displays adequate comfort level or baseline comfort level  Description: Interventions:  - Encourage patient to monitor pain and request assistance  - Assess pain using appropriate pain scale  - Administer analgesics as ordered based on type and severity of pain and evaluate response  - Implement non-pharmacological measures as appropriate and evaluate response  - Consider cultural and social influences on pain and pain management  - Notify physician/advanced practitioner if interventions unsuccessful or patient reports new pain  - Educate patient/family on pain management process including their role and importance of  reporting pain   - Provide non-pharmacologic/complimentary pain relief interventions  Outcome: Progressing     Problem: INFECTION - ADULT  Goal: Absence or prevention of progression during hospitalization  Description: INTERVENTIONS:  - Assess and monitor for signs and symptoms of infection  - Monitor lab/diagnostic results  - Monitor all insertion sites, i.e. indwelling lines, tubes, and drains  - Monitor endotracheal if appropriate and nasal secretions for changes in amount and color  - [place] appropriate cooling/warming therapies per order  - Administer medications as ordered  - Instruct and encourage patient and family to use good hand hygiene technique  - Identify and instruct in appropriate isolation precautions for identified infection/condition  Outcome: Progressing  Goal: Absence of fever/infection during neutropenic period  Description: INTERVENTIONS:  - Monitor WBC  - Perform strict hand hygiene  - Limit to healthy visitors only  - No plants, dried, fresh or silk flowers with valle in patient room  Outcome: Progressing     Problem: SAFETY ADULT  Goal: Patient will remain free of falls  Description: INTERVENTIONS:  - Educate patient/family on patient safety including  physical limitations  - Instruct patient to call for assistance with activity   - Consider consulting OT/PT to assist with strengthening/mobility based on AM PAC & JH-HLM score  - Consult OT/PT to assist with strengthening/mobility   - Keep Call bell within reach  - Keep bed low and locked with side rails adjusted as appropriate  - Keep care items and personal belongings within reach  - Initiate and maintain comfort rounds  - Make Fall Risk Sign visible to staff  - Apply yellow socks and bracelet for high fall risk patients  - Consider moving patient to room near nurses station  Outcome: Progressing  Goal: Maintain or return to baseline ADL function  Description: INTERVENTIONS:  -  Assess patient's ability to carry out ADLs; assess patient's baseline for ADL function and identify physical deficits which impact ability to perform ADLs (bathing, care of mouth/teeth, toileting, grooming, dressing, etc.)  - Assess/evaluate cause of self-care deficits   - Assess range of motion  - Assess patient's mobility; develop plan if impaired  - Assess patient's need for assistive devices and provide as appropriate  - Encourage maximum independence but intervene and supervise when necessary  - Involve family in performance of ADLs  - Assess for home care needs following discharge   - Consider OT consult to assist with ADL evaluation and planning for discharge  - Provide patient education as appropriate  - Monitor functional capacity and physical performance, use of AM PAC & JH-HLM   - Monitor gait, balance and fatigue with ambulation    Outcome: Progressing  Goal: Maintains/Returns to pre admission functional level  Description: INTERVENTIONS:  - Perform AM-PAC 6 Click Basic Mobility/ Daily Activity assessment daily.  - Set and communicate daily mobility goal to care team and patient/family/caregiver.   - Collaborate with rehabilitation services on mobility goals if consulted  - Perform Range of Motion    times a day.  - Reposition  patient every    hours.  - Dangle patient    times a day  - Stand patient    times a day  - Ambulate patient    times a day  - Out of bed to chair    times a day   - Out of bed for meals      times a day  - Out of bed for toileting  - Record patient progress and toleration of activity level   Outcome: Progressing     Problem: DISCHARGE PLANNING  Goal: Discharge to home or other facility with appropriate resources  Description: INTERVENTIONS:  - Identify barriers to discharge w/patient and caregiver  - Arrange for needed discharge resources and transportation as appropriate  - Identify discharge learning needs (meds, wound care, etc.)  - Arrange for interpretive services to assist at discharge as needed  - Refer to Case Management Department for coordinating discharge planning if the patient needs post-hospital services based on physician/advanced practitioner order or complex needs related to functional status, cognitive ability, or social support system  Outcome: Progressing     Problem: Knowledge Deficit  Goal: Patient/family/caregiver demonstrates understanding of disease process, treatment plan, medications, and discharge instructions  Description: Complete learning assessment and assess knowledge base.  Interventions:  - Provide teaching at level of understanding  - Provide teaching via preferred learning methods  Outcome: Progressing

## 2025-05-28 LAB
ANION GAP SERPL CALCULATED.3IONS-SCNC: 6 MMOL/L (ref 4–13)
BACTERIA BLD CULT: NORMAL
BUN SERPL-MCNC: 16 MG/DL (ref 5–25)
CALCIUM SERPL-MCNC: 9.1 MG/DL (ref 8.4–10.2)
CHLORIDE SERPL-SCNC: 101 MMOL/L (ref 96–108)
CO2 SERPL-SCNC: 35 MMOL/L (ref 21–32)
CREAT SERPL-MCNC: 0.95 MG/DL (ref 0.6–1.3)
GFR SERPL CREATININE-BSD FRML MDRD: 95 ML/MIN/1.73SQ M
GLUCOSE SERPL-MCNC: 106 MG/DL (ref 65–140)
MAGNESIUM SERPL-MCNC: 2 MG/DL (ref 1.9–2.7)
POTASSIUM SERPL-SCNC: 3.9 MMOL/L (ref 3.5–5.3)
SODIUM SERPL-SCNC: 142 MMOL/L (ref 135–147)

## 2025-05-28 PROCEDURE — 80048 BASIC METABOLIC PNL TOTAL CA: CPT | Performed by: FAMILY MEDICINE

## 2025-05-28 PROCEDURE — 87081 CULTURE SCREEN ONLY: CPT | Performed by: FAMILY MEDICINE

## 2025-05-28 PROCEDURE — 83735 ASSAY OF MAGNESIUM: CPT | Performed by: FAMILY MEDICINE

## 2025-05-28 PROCEDURE — 99232 SBSQ HOSP IP/OBS MODERATE 35: CPT | Performed by: FAMILY MEDICINE

## 2025-05-28 RX ORDER — FUROSEMIDE 10 MG/ML
40 INJECTION INTRAMUSCULAR; INTRAVENOUS
Status: COMPLETED | OUTPATIENT
Start: 2025-05-28 | End: 2025-05-28

## 2025-05-28 RX ADMIN — OXYCODONE HYDROCHLORIDE 5 MG: 5 TABLET ORAL at 08:03

## 2025-05-28 RX ADMIN — CEFAZOLIN SODIUM 2000 MG: 2 SOLUTION INTRAVENOUS at 20:48

## 2025-05-28 RX ADMIN — FUROSEMIDE 40 MG: 10 INJECTION, SOLUTION INTRAMUSCULAR; INTRAVENOUS at 08:03

## 2025-05-28 RX ADMIN — FUROSEMIDE 40 MG: 10 INJECTION, SOLUTION INTRAMUSCULAR; INTRAVENOUS at 16:41

## 2025-05-28 RX ADMIN — OXYCODONE HYDROCHLORIDE 5 MG: 5 TABLET ORAL at 20:52

## 2025-05-28 RX ADMIN — CEFAZOLIN SODIUM 2000 MG: 2 SOLUTION INTRAVENOUS at 14:09

## 2025-05-28 RX ADMIN — CEFAZOLIN SODIUM 2000 MG: 2 SOLUTION INTRAVENOUS at 08:03

## 2025-05-28 RX ADMIN — CEFAZOLIN SODIUM 2000 MG: 2 SOLUTION INTRAVENOUS at 01:51

## 2025-05-28 RX ADMIN — LISINOPRIL 20 MG: 20 TABLET ORAL at 08:03

## 2025-05-28 RX ADMIN — SPIRONOLACTONE 25 MG: 25 TABLET, FILM COATED ORAL at 08:03

## 2025-05-28 RX ADMIN — OXYCODONE HYDROCHLORIDE 5 MG: 5 TABLET ORAL at 14:18

## 2025-05-28 NOTE — PROGRESS NOTES
Progress Note - Infectious Disease   Name: Nicolás Balderrama 45 y.o. male I MRN: [id number]  Unit/Bed#: -01 I Date of Admission: 5/22/2025   Date of Service: 5/28/2025 I Hospital Day: 6      Administrative Statements   VIRTUAL CARE DOCUMENTATION:     1. This service was provided via Telemedicine using [employer]     2. Parties in the room with patient during teleconsult Patient only    3. Confidentiality My office door was closed     4. Participants No one else was in the room    5. Patient acknowledged consent and understanding of privacy and security of the  Telemedicine consult. I informed the patient that I have reviewed their record in Epic and presented the opportunity for them to ask any questions regarding the visit today.  The patient agreed to participate.    6. I have spent a total time of 50 minutes in caring for this patient on the day of the visit/encounter including Diagnostic results, Prognosis, Risks and benefits of tx options, Importance of tx compliance, Risk factor reductions, Impressions, Counseling / Coordination of care, Documenting in the medical record, Reviewing/placing orders in the medical record (including tests, medications, and/or procedures), Obtaining or reviewing history  , and Communicating with other healthcare professionals , not including the time spent for establishing the audio/video connection.       Assessment & Plan  Sepsis (HCC)  Evidenced by fever at home, tachycardia, leukocytosis  Source of sepsis is recurrent right lower extremity cellulitis  Clinically improving, afebrile and hemodynamically stable without leukocytosis    Antibiotics and additional management as below   Check daily CBC, CMP while inpatient to monitor for any evolving antibiotic toxicity or treatment failure   Continue supportive care, monitor clinical course    Cellulitis of right lower extremity  Right lower extremity cellulitis in the setting of morbid obesity, chronic venous edema and job  requirements of prolonged standing/immobilization  Third hospitalization in 3 years.   Suspect strep spp. No clinical or CT evidence of abscess. Doppler negative for DVT  Anticipate slow improvement due to extent of edema       Continue cefazolin, adjusted dose to 2 q6h   Follow-up blood cultures   Continue aggressive iv diuretics as tolerated by renal function and BP   Recommend compression wrap   Continue serial extremity exam   On discharge patient can be transitioned to po cefadroxil 1g bid to complete 10-14 days of therapy   Would recommend consideration of penicillin prophylaxis for 6-12 months until underlying risk factors can be addressed but patient currently is declining due to risk of adverse effects including c diff   As an outpatient would recommend obtaining dopplers with venous reflux to assess for any venous insufficiency and referral to vascular surgery  Discussed importance of compliance as an outpatient with lower extremity skin care, management of venous edema with oral diuretics, compression stockings, limb elevation  Recommend weight loss  Discussed natural history of cellulitis and discussed with patient that he is at risk for recurrent cellulitis/bacteremia if underlying risk factors cannot be modified    Morbid obesity (HCC)  Risk factor for recurrent infection     Strongly recommend patient follow-up with bariatric center for lifestyle modification, weight loss        Antibiotics:  cefazolin    Subjective   Patient has no fever, chills, sweats; no nausea, vomiting, diarrhea; no cough, shortness of breath; no pain. No new symptoms. Patient notes improving leg swelling, improving redness    Objective :  Temp:  [97.5 °F (36.4 °C)] 97.5 °F (36.4 °C)  HR:  [78] 78  BP: (118-147)/(92-97) 147/97  Resp:  [18] 18  SpO2:  [96 %] 96 %  O2 Device: None (Room air)    General:  No acute distress  Psychiatric:  Awake and alert  Pulmonary:  Normal respiratory excursion without accessory muscle  use  Abdomen:  Soft, nontender  Extremities:  1-2+ RLE edema  Skin:  Circumferential erythema of RLE - improving      Lab Results: I have reviewed the following results:  Results from last 7 days   Lab Units 05/25/25  0511 05/24/25  0514 05/23/25  0807   WBC Thousand/uL 8.29 9.21 10.70*   HEMOGLOBIN g/dL 14.5 13.8 13.7   PLATELETS Thousands/uL 232 191 182     Results from last 7 days   Lab Units 05/28/25  0601 05/25/25  0511 05/24/25  0514 05/23/25  0537 05/22/25  1825   SODIUM mmol/L 142 136 137   < > 135   POTASSIUM mmol/L 3.9 3.6 3.3*   < > 3.7   CHLORIDE mmol/L 101 100 102   < > 101   CO2 mmol/L 35* 27 28   < > 26   BUN mg/dL 16 13 12   < > 16   CREATININE mg/dL 0.95 0.76 0.85   < > 0.96   EGFR ml/min/1.73sq m 95 109 104   < > 94   CALCIUM mg/dL 9.1 8.8 8.7   < > 9.1   AST U/L  --  19 17  --  32   ALT U/L  --  14 18  --  34   ALK PHOS U/L  --  80 76  --  80   ALBUMIN g/dL  --  3.6 3.3*  --  3.8    < > = values in this interval not displayed.     Results from last 7 days   Lab Units 05/22/25  1941 05/22/25  1825   BLOOD CULTURE  No Growth After 5 Days. No Growth After 5 Days.     Results from last 7 days   Lab Units 05/23/25 0537 05/22/25  1825   PROCALCITONIN ng/ml 5.12* 6.78*

## 2025-05-28 NOTE — ASSESSMENT & PLAN NOTE
Right lower extremity cellulitis in the setting of morbid obesity, chronic venous edema and job requirements of prolonged standing/immobilization  Third hospitalization in 3 years.   Suspect strep spp. No clinical or CT evidence of abscess. Doppler negative for DVT  Anticipate slow improvement due to extent of edema       Continue cefazolin, adjusted dose to 2 q6h   Follow-up blood cultures   Continue aggressive iv diuretics as tolerated by renal function and BP   Recommend compression wrap   Continue serial extremity exam   On discharge patient can be transitioned to po cefadroxil 1g bid to complete 10-14 days of therapy   Would recommend consideration of penicillin prophylaxis for 6-12 months until underlying risk factors can be addressed but patient currently is declining due to risk of adverse effects including c diff   As an outpatient would recommend obtaining dopplers with venous reflux to assess for any venous insufficiency and referral to vascular surgery  Discussed importance of compliance as an outpatient with lower extremity skin care, management of venous edema with oral diuretics, compression stockings, limb elevation  Recommend weight loss  Discussed natural history of cellulitis and discussed with patient that he is at risk for recurrent cellulitis/bacteremia if underlying risk factors cannot be modified

## 2025-05-28 NOTE — ASSESSMENT & PLAN NOTE
Presents with 2 days of fever, flulike symptoms and right lower extremity erythema.  Rx'd Keflex without improvement  History of recurrent right lower extremity cellulitis with previous hospitalizations and successful treatment with Ancef  Works as a , no history of MRSA  We will check MRSA culture swab for completeness  Trend fever curve and leukocytosis  Procalcitonin elevated  Blood cultures neg  X-ray right lower :without obvious gas formation  Ct rt le Diffuse skin thickening and subcutaneous fat stranding suggestive of right lower extremity cellulitis in the appropriate setting. No discrete abscess or evidence of deep intramuscular infection.   After discussion with infectious disease, will continue IV antibiotic (ancef) and reassess.  Also patient  diuretics therapy increased to facilitate  recovery and help decrease edema.  Can start using compressions.  Patient right lower extremity still significantly swollen and red, venous duplex neg  Continue IV Lasix today and Aldactone.  Will review BMP in a.m. and further diuretics based on that

## 2025-05-28 NOTE — PROGRESS NOTES
Progress Note - Hospitalist   Name: Nicolás Balderrama 45 y.o. male I MRN: [id number]  Unit/Bed#: -01 I Date of Admission: 5/22/2025   Date of Service: 5/28/2025 I Hospital Day: 6    Assessment & Plan  Cellulitis of right lower extremity  Presents with 2 days of fever, flulike symptoms and right lower extremity erythema.  Rx'd Keflex without improvement  History of recurrent right lower extremity cellulitis with previous hospitalizations and successful treatment with Ancef  Works as a , no history of MRSA  We will check MRSA culture swab for completeness  Trend fever curve and leukocytosis  Procalcitonin elevated  Blood cultures neg  X-ray right lower :without obvious gas formation  Ct rt le Diffuse skin thickening and subcutaneous fat stranding suggestive of right lower extremity cellulitis in the appropriate setting. No discrete abscess or evidence of deep intramuscular infection.   After discussion with infectious disease, will continue IV antibiotic (ancef) and reassess.  Also patient  diuretics therapy increased to facilitate  recovery and help decrease edema.  Can start using compressions.  Patient right lower extremity still significantly swollen and red, venous duplex neg  Continue IV Lasix today and Aldactone.  Will review BMP in a.m. and further diuretics based on that      Sepsis (HCC)  Meeting criteria with tachycardia, leukocytosis with source of right lower extremity cellulitis source (recurrent in nature)  Blood cultures neg  Continue iv ancef  Morbid obesity (HCC)  BMI 53  Morbid obesity complicating all aspects of healthcare  Requires intensive lifestyle modification   Essential hypertension  Continue PTA lisinopril, furosemide, spironolactone  blood pressures controlled    VTE Pharmacologic Prophylaxis:   Moderate Risk (Score 3-4) - Pharmacological DVT Prophylaxis Ordered: enoxaparin (Lovenox).    Mobility:   Basic Mobility Inpatient Raw Score: 24  -HLM Goal: 8: Walk 250 feet  or more  JH-HLM Achieved: 8: Walk 250 feet ot more  JH-HLM Goal achieved. Continue to encourage appropriate mobility.    Patient Centered Rounds: I performed bedside rounds with nursing staff today.   Discussions with Specialists or Other Care Team Provider: dw inf disease    Education and Discussions with Family / Patient:     Current Length of Stay: 6 day(s)  Current Patient Status: Inpatient   Certification Statement: The patient will continue to require additional inpatient hospital stay due to leg cellulitis  Discharge Plan: Anticipate discharge tomorrow to home.    Code Status: Level 1 - Full Code    Subjective   Patient still complaining of significant pain and swelling of his right leg.  He understands that it is going to get better slowly and gradually and is agreeable with current treatment plan    Objective :  Temp:  [97.5 °F (36.4 °C)] 97.5 °F (36.4 °C)  HR:  [78] 78  BP: (118-147)/(92-97) 147/97  Resp:  [18] 18  SpO2:  [96 %-98 %] 98 %  O2 Device: None (Room air)    Body mass index is 55.82 kg/m².     Input and Output Summary (last 24 hours):     Intake/Output Summary (Last 24 hours) at 5/28/2025 1239  Last data filed at 5/28/2025 0803  Gross per 24 hour   Intake 480 ml   Output --   Net 480 ml       Physical Exam  Vitals and nursing note reviewed.   Constitutional:       Appearance: Normal appearance.   HENT:      Head: Normocephalic and atraumatic.      Right Ear: External ear normal.      Left Ear: External ear normal.      Nose: Nose normal.      Mouth/Throat:      Pharynx: Oropharynx is clear.     Cardiovascular:      Rate and Rhythm: Normal rate and regular rhythm.      Heart sounds: Normal heart sounds.   Pulmonary:      Effort: Pulmonary effort is normal.      Breath sounds: Normal breath sounds.   Abdominal:      General: Bowel sounds are normal.      Palpations: Abdomen is soft.      Tenderness: There is no abdominal tenderness.     Musculoskeletal:         General: Normal range of motion.       Cervical back: Normal range of motion and neck supple.      Comments: Right lower extremity edema redness and tenderness on palpation     Skin:     General: Skin is warm and dry.      Capillary Refill: Capillary refill takes less than 2 seconds.     Neurological:      General: No focal deficit present.      Mental Status: He is alert and oriented to person, place, and time.     Psychiatric:         Mood and Affect: Mood normal.           Lines/Drains:              Lab Results: I have reviewed the following results:   Results from last 7 days   Lab Units 05/25/25  0511   WBC Thousand/uL 8.29   HEMOGLOBIN g/dL 14.5   HEMATOCRIT % 43.9   PLATELETS Thousands/uL 232   SEGS PCT % 66   LYMPHO PCT % 21   MONO PCT % 9   EOS PCT % 2     Results from last 7 days   Lab Units 05/28/25  0601 05/25/25  0511   SODIUM mmol/L 142 136   POTASSIUM mmol/L 3.9 3.6   CHLORIDE mmol/L 101 100   CO2 mmol/L 35* 27   BUN mg/dL 16 13   CREATININE mg/dL 0.95 0.76   ANION GAP mmol/L 6 9   CALCIUM mg/dL 9.1 8.8   ALBUMIN g/dL  --  3.6   TOTAL BILIRUBIN mg/dL  --  0.99   ALK PHOS U/L  --  80   ALT U/L  --  14   AST U/L  --  19   GLUCOSE RANDOM mg/dL 106 103         Results from last 7 days   Lab Units 05/23/25  0741   POC GLUCOSE mg/dl 130         Results from last 7 days   Lab Units 05/23/25  0537 05/22/25  1825   LACTIC ACID mmol/L  --  1.2   PROCALCITONIN ng/ml 5.12* 6.78*       Recent Cultures (last 7 days):   Results from last 7 days   Lab Units 05/22/25  1941 05/22/25  1825   BLOOD CULTURE  No Growth After 5 Days. No Growth After 5 Days.       Imaging Results Review: I reviewed radiology reports from this admission including: Ultrasound(s).  Other Study Results Review: EKG was reviewed.     Last 24 Hours Medication List:     Current Facility-Administered Medications:     acetaminophen (TYLENOL) tablet 975 mg, Q8H PRN    ceFAZolin (ANCEF) IVPB (premix in dextrose) 2,000 mg 50 mL, Q6H, Last Rate: 2,000 mg (05/28/25 0803)    enoxaparin  (LOVENOX) subcutaneous injection 60 mg, BID    furosemide (LASIX) injection 40 mg, BID (diuretic)    lisinopril (ZESTRIL) tablet 20 mg, Daily    morphine injection 4 mg, Q4H PRN    oxyCODONE (ROXICODONE) IR tablet 2.5 mg, Q6H PRN    oxyCODONE (ROXICODONE) IR tablet 5 mg, Q6H PRN    spironolactone (ALDACTONE) tablet 25 mg, Daily    Administrative Statements   Today, Patient Was Seen By: Sobeida Warren MD      **Please Note: This note may have been constructed using a voice recognition system.**

## 2025-05-29 ENCOUNTER — TRANSITIONAL CARE MANAGEMENT (OUTPATIENT)
Dept: FAMILY MEDICINE CLINIC | Facility: CLINIC | Age: 45
End: 2025-05-29

## 2025-05-29 VITALS
OXYGEN SATURATION: 96 % | SYSTOLIC BLOOD PRESSURE: 134 MMHG | RESPIRATION RATE: 18 BRPM | WEIGHT: 315 LBS | BODY MASS INDEX: 46.65 KG/M2 | TEMPERATURE: 97.2 F | DIASTOLIC BLOOD PRESSURE: 98 MMHG | HEIGHT: 69 IN | HEART RATE: 79 BPM

## 2025-05-29 LAB
ANION GAP SERPL CALCULATED.3IONS-SCNC: 6 MMOL/L (ref 4–13)
BUN SERPL-MCNC: 15 MG/DL (ref 5–25)
CALCIUM SERPL-MCNC: 8.9 MG/DL (ref 8.4–10.2)
CHLORIDE SERPL-SCNC: 100 MMOL/L (ref 96–108)
CO2 SERPL-SCNC: 30 MMOL/L (ref 21–32)
CREAT SERPL-MCNC: 0.75 MG/DL (ref 0.6–1.3)
GFR SERPL CREATININE-BSD FRML MDRD: 110 ML/MIN/1.73SQ M
GLUCOSE SERPL-MCNC: 103 MG/DL (ref 65–140)
MRSA NOSE QL CULT: NORMAL
POTASSIUM SERPL-SCNC: 5.4 MMOL/L (ref 3.5–5.3)
SODIUM SERPL-SCNC: 136 MMOL/L (ref 135–147)

## 2025-05-29 PROCEDURE — 80048 BASIC METABOLIC PNL TOTAL CA: CPT | Performed by: FAMILY MEDICINE

## 2025-05-29 PROCEDURE — 99239 HOSP IP/OBS DSCHRG MGMT >30: CPT | Performed by: FAMILY MEDICINE

## 2025-05-29 RX ORDER — SACCHAROMYCES BOULARDII 250 MG
250 CAPSULE ORAL 2 TIMES DAILY
Qty: 60 CAPSULE | Refills: 0 | Status: SHIPPED | OUTPATIENT
Start: 2025-05-29 | End: 2025-06-28

## 2025-05-29 RX ORDER — FUROSEMIDE 40 MG/1
40 TABLET ORAL DAILY
Qty: 30 TABLET | Refills: 0 | Status: SHIPPED | OUTPATIENT
Start: 2025-05-29 | End: 2025-06-28

## 2025-05-29 RX ORDER — FUROSEMIDE 40 MG/1
40 TABLET ORAL DAILY
Status: DISCONTINUED | OUTPATIENT
Start: 2025-05-29 | End: 2025-05-29 | Stop reason: HOSPADM

## 2025-05-29 RX ORDER — LISINOPRIL 40 MG/1
40 TABLET ORAL DAILY
Qty: 30 TABLET | Refills: 0 | Status: SHIPPED | OUTPATIENT
Start: 2025-05-29 | End: 2025-06-28

## 2025-05-29 RX ORDER — OXYCODONE AND ACETAMINOPHEN 5; 325 MG/1; MG/1
1 TABLET ORAL 2 TIMES DAILY PRN
Qty: 10 TABLET | Refills: 0 | Status: SHIPPED | OUTPATIENT
Start: 2025-05-29 | End: 2025-06-08

## 2025-05-29 RX ORDER — CEFADROXIL 1000 MG/1
1 TABLET ORAL 2 TIMES DAILY
Qty: 14 TABLET | Refills: 0 | Status: SHIPPED | OUTPATIENT
Start: 2025-05-29 | End: 2025-06-05

## 2025-05-29 RX ORDER — PENICILLIN V POTASSIUM 500 MG/1
500 TABLET, FILM COATED ORAL EVERY 12 HOURS SCHEDULED
Qty: 60 TABLET | Refills: 0 | Status: SHIPPED | OUTPATIENT
Start: 2025-06-09 | End: 2025-07-09

## 2025-05-29 RX ORDER — ACETAMINOPHEN 325 MG/1
650 TABLET ORAL EVERY 8 HOURS PRN
Start: 2025-05-29

## 2025-05-29 RX ADMIN — OXYCODONE HYDROCHLORIDE 2.5 MG: 5 TABLET ORAL at 02:30

## 2025-05-29 RX ADMIN — LISINOPRIL 20 MG: 20 TABLET ORAL at 08:32

## 2025-05-29 RX ADMIN — CEFAZOLIN SODIUM 2000 MG: 2 SOLUTION INTRAVENOUS at 02:24

## 2025-05-29 RX ADMIN — FUROSEMIDE 40 MG: 40 TABLET ORAL at 11:13

## 2025-05-29 RX ADMIN — CEFAZOLIN SODIUM 2000 MG: 2 SOLUTION INTRAVENOUS at 08:33

## 2025-05-29 RX ADMIN — SPIRONOLACTONE 25 MG: 25 TABLET, FILM COATED ORAL at 08:32

## 2025-05-29 NOTE — PLAN OF CARE
Problem: Potential for Falls  Goal: Patient will remain free of falls  Description: INTERVENTIONS:  - Educate patient/family on patient safety including physical limitations  - Instruct patient to call for assistance with activity   - Consider consulting OT/PT to assist with strengthening/mobility based on AM PAC & JH-HLM score  - Consult OT/PT to assist with strengthening/mobility   - Keep Call bell within reach  - Keep bed low and locked with side rails adjusted as appropriate  - Keep care items and personal belongings within reach  - Initiate and maintain comfort rounds  - Make Fall Risk Sign visible to staff  - Apply yellow socks and bracelet for high fall risk patients  - Consider moving patient to room near nurses station  Outcome: Progressing     Problem: Nutrition/Hydration-ADULT  Goal: Nutrient/Hydration intake appropriate for improving, restoring or maintaining nutritional needs  Description: Monitor and assess patient's nutrition/hydration status for malnutrition. Collaborate with interdisciplinary team and initiate plan and interventions as ordered.  Monitor patient's weight and dietary intake as ordered or per policy. Utilize nutrition screening tool and intervene as necessary. Determine patient's food preferences and provide high-protein, high-caloric foods as appropriate.     INTERVENTIONS:  - Monitor oral intake, urinary output, labs, and treatment plans  - Assess nutrition and hydration status and recommend course of action  - Evaluate amount of meals eaten  - Assist patient with eating if necessary   - Allow adequate time for meals  - Recommend/ encourage appropriate diets, oral nutritional supplements, and vitamin/mineral supplements  - Order, calculate, and assess calorie counts as needed  - Recommend, monitor, and adjust tube feedings and TPN/PPN based on assessed needs  - Assess need for intravenous fluids  - Provide specific nutrition/hydration education as appropriate  - Include  patient/family/caregiver in decisions related to nutrition  Outcome: Progressing     Problem: PAIN - ADULT  Goal: Verbalizes/displays adequate comfort level or baseline comfort level  Description: Interventions:  - Encourage patient to monitor pain and request assistance  - Assess pain using appropriate pain scale  - Administer analgesics as ordered based on type and severity of pain and evaluate response  - Implement non-pharmacological measures as appropriate and evaluate response  - Consider cultural and social influences on pain and pain management  - Notify physician/advanced practitioner if interventions unsuccessful or patient reports new pain  - Educate patient/family on pain management process including their role and importance of  reporting pain   - Provide non-pharmacologic/complimentary pain relief interventions  Outcome: Progressing     Problem: INFECTION - ADULT  Goal: Absence or prevention of progression during hospitalization  Description: INTERVENTIONS:  - Assess and monitor for signs and symptoms of infection  - Monitor lab/diagnostic results  - Monitor all insertion sites, i.e. indwelling lines, tubes, and drains  - Monitor endotracheal if appropriate and nasal secretions for changes in amount and color  - Yancey appropriate cooling/warming therapies per order  - Administer medications as ordered  - Instruct and encourage patient and family to use good hand hygiene technique  - Identify and instruct in appropriate isolation precautions for identified infection/condition  Outcome: Progressing  Goal: Absence of fever/infection during neutropenic period  Description: INTERVENTIONS:  - Monitor WBC  - Perform strict hand hygiene  - Limit to healthy visitors only  - No plants, dried, fresh or silk flowers with valle in patient room  Outcome: Progressing     Problem: SAFETY ADULT  Goal: Patient will remain free of falls  Description: INTERVENTIONS:  - Educate patient/family on patient safety including  physical limitations  - Instruct patient to call for assistance with activity   - Consider consulting OT/PT to assist with strengthening/mobility based on AM PAC & JH-HLM score  - Consult OT/PT to assist with strengthening/mobility   - Keep Call bell within reach  - Keep bed low and locked with side rails adjusted as appropriate  - Keep care items and personal belongings within reach  - Initiate and maintain comfort rounds  - Make Fall Risk Sign visible to staff  - Apply yellow socks and bracelet for high fall risk patients  - Consider moving patient to room near nurses station  Outcome: Progressing  Goal: Maintain or return to baseline ADL function  Description: INTERVENTIONS:  -  Assess patient's ability to carry out ADLs; assess patient's baseline for ADL function and identify physical deficits which impact ability to perform ADLs (bathing, care of mouth/teeth, toileting, grooming, dressing, etc.)  - Assess/evaluate cause of self-care deficits   - Assess range of motion  - Assess patient's mobility; develop plan if impaired  - Assess patient's need for assistive devices and provide as appropriate  - Encourage maximum independence but intervene and supervise when necessary  - Involve family in performance of ADLs  - Assess for home care needs following discharge   - Consider OT consult to assist with ADL evaluation and planning for discharge  - Provide patient education as appropriate  - Monitor functional capacity and physical performance, use of AM PAC & JH-HLM   - Monitor gait, balance and fatigue with ambulation    Outcome: Progressing  Goal: Maintains/Returns to pre admission functional level  Description: INTERVENTIONS:  - Perform AM-PAC 6 Click Basic Mobility/ Daily Activity assessment daily.  - Set and communicate daily mobility goal to care team and patient/family/caregiver.   - Collaborate with rehabilitation services on mobility goals if consulted  - Perform Range of Motion  times a day.  - Reposition  patient every  hours.  - Dangle patient  times a day  - Stand patient  times a day  - Ambulate patient times a day  - Out of bed to chair  times a day   - Out of bed for meals  times a day  - Out of bed for toileting  - Record patient progress and toleration of activity level   Outcome: Progressing     Problem: DISCHARGE PLANNING  Goal: Discharge to home or other facility with appropriate resources  Description: INTERVENTIONS:  - Identify barriers to discharge w/patient and caregiver  - Arrange for needed discharge resources and transportation as appropriate  - Identify discharge learning needs (meds, wound care, etc.)  - Arrange for interpretive services to assist at discharge as needed  - Refer to Case Management Department for coordinating discharge planning if the patient needs post-hospital services based on physician/advanced practitioner order or complex needs related to functional status, cognitive ability, or social support system  Outcome: Progressing     Problem: Knowledge Deficit  Goal: Patient/family/caregiver demonstrates understanding of disease process, treatment plan, medications, and discharge instructions  Description: Complete learning assessment and assess knowledge base.  Interventions:  - Provide teaching at level of understanding  - Provide teaching via preferred learning methods  Outcome: Progressing

## 2025-05-29 NOTE — ASSESSMENT & PLAN NOTE
Meeting criteria with tachycardia, leukocytosis with source of right lower extremity cellulitis source (recurrent in nature)  Blood cultures neg  Continue iv ancef. Switch to oral antibiotics

## 2025-05-29 NOTE — ASSESSMENT & PLAN NOTE
Presents with 2 days of fever, flulike symptoms and right lower extremity erythema.  Rx'd Keflex without improvement  History of recurrent right lower extremity cellulitis with previous hospitalizations and successful treatment with Ancef  Works as a , no history of MRSA  We will check MRSA culture swab for completeness  Trend fever curve and leukocytosis  Procalcitonin elevated  Blood cultures neg  X-ray right lower :without obvious gas formation  Ct rt le Diffuse skin thickening and subcutaneous fat stranding suggestive of right lower extremity cellulitis in the appropriate setting. No discrete abscess or evidence of deep intramuscular infection.   After discussion with infectious disease, will continue IV antibiotic (ancef) and reassess.  Also patient  diuretics therapy increased to facilitate  recovery and help decrease edema.  Can start using compressions.  Patient right lower extremity still significantly swollen and red, venous duplex neg  patient IV Lasix today and Aldactone. Convert to oral diuretics on discharge  On discharge patient can be transitioned to po cefadroxil 1g bid to complete 10-14 days of therapy               Would recommend consideration of penicillin prophylaxis for 6-12 months until underlying risk factors can be addressed but patient currently is declining due to risk of adverse effects including c diff

## 2025-05-29 NOTE — DISCHARGE SUMMARY
Discharge Summary - Hospitalist   Name: Nicolás Balderrama 45 y.o. male I MRN: [id number]  Unit/Bed#: -01 I Date of Admission: 5/22/2025   Date of Service: 5/29/2025 I Hospital Day: 7     Assessment & Plan  Cellulitis of right lower extremity  Presents with 2 days of fever, flulike symptoms and right lower extremity erythema.  Rx'd Keflex without improvement  History of recurrent right lower extremity cellulitis with previous hospitalizations and successful treatment with Ancef  Works as a , no history of MRSA  We will check MRSA culture swab for completeness  Trend fever curve and leukocytosis  Procalcitonin elevated  Blood cultures neg  X-ray right lower :without obvious gas formation  Ct rt le Diffuse skin thickening and subcutaneous fat stranding suggestive of right lower extremity cellulitis in the appropriate setting. No discrete abscess or evidence of deep intramuscular infection.   After discussion with infectious disease, will continue IV antibiotic (ancef) and reassess.  Also patient  diuretics therapy increased to facilitate  recovery and help decrease edema.  Can start using compressions.  Patient right lower extremity still significantly swollen and red, venous duplex neg  patient IV Lasix today and Aldactone. Convert to oral diuretics on discharge  On discharge patient can be transitioned to po cefadroxil 1g bid to complete 10-14 days of therapy               Would recommend consideration of penicillin prophylaxis for 6-12 months until underlying risk factors can be addressed but patient currently is declining due to risk of adverse effects including c diff      Sepsis (HCC)  Meeting criteria with tachycardia, leukocytosis with source of right lower extremity cellulitis source (recurrent in nature)  Blood cultures neg  Continue iv ancef. Switch to oral antibiotics  Morbid obesity (HCC)  BMI 53  Morbid obesity complicating all aspects of healthcare  Requires intensive lifestyle  modification   Essential hypertension  Continue PTA lisinopril, furosemide, spironolactone  blood pressures controlled     Medical Problems       Resolved Problems  Date Reviewed: 5/28/2025   None       Discharging Physician / Practitioner: Sobeida Warren MD  PCP: KELLI Palacios  Admission Date:   Admission Orders (From admission, onward)       Ordered        05/22/25 2106  INPATIENT ADMISSION  Once                          Discharge Date: 05/29/25    Next Steps for Physician/AP Assuming Care:  Outpt venous doppler with venous reflux to r/o venous insufficiency  Cbc,bmp in 2 weeks    Test Results Pending at Discharge (will require follow up):  none    Medication Changes for Discharge & Rationale:   See after visit summary for reconciled discharge medications provided to patient and/or family.     Consultations During Hospital Stay:  Inf disease    Procedures Performed:   none    Significant Findings / Test Results:   CT lower extremity w contrast right  Result Date: 5/26/2025  Impression: Diffuse skin thickening and subcutaneous fat stranding suggestive of right lower extremity cellulitis in the appropriate setting. No discrete abscess or evidence of deep intramuscular infection. Resident: Neftali Fan I, the attending radiologist, have reviewed the images and agree with the final report above. Workstation performed: [id number]     XR tibia fibula 2 views RIGHT  Result Date: 5/23/2025  Impression: No acute osseous abnormality. Computerized Assisted Algorithm (CAA) may have been used to analyze all applicable images. Workstation performed: [id number]      Incidental Findings:     Hospital Course:   Nicolás Balderrama is a 45 y.o. male patient who originally presented to the hospital on 5/22/2025 due to right lower extremity cellulitis with lower extremity extensive edema noted.  Patient was placed on IV antibiotics and IV diuretics following which she is slowly gradually improving.  Will discharge home and  "continue diuretics and antibiotic regimen as outlined above with outpatient follow-up with PCP recommended and workup done for venous insufficiency.      Please see above list of diagnoses and related plan for additional information.     Discharge Day Visit / Exam:   Subjective: Patient denies any chest pain or shortness of breath or abdominal pain . pain and swelling in his right leg is improving  Vitals: Blood Pressure: 134/98 (05/29/25 0742)  Pulse: 79 (05/29/25 0742)  Temperature: (!) 97.2 °F (36.2 °C) (05/29/25 0742)  Temp Source: Temporal (05/25/25 2113)  Respirations: 18 (05/29/25 0742)  Height: 5' 9\" (175.3 cm) (05/22/25 2225)  Weight - Scale: (!) 171 kg (376 lb 6.4 oz) (05/29/25 0540)  SpO2: 96 % (05/29/25 0742)  Physical Exam  Vitals and nursing note reviewed.   Constitutional:       Appearance: Normal appearance. He is not ill-appearing.   HENT:      Head: Normocephalic and atraumatic.      Right Ear: External ear normal.      Left Ear: External ear normal.      Nose: Nose normal.      Mouth/Throat:      Pharynx: Oropharynx is clear.     Eyes:      Pupils: Pupils are equal, round, and reactive to light.       Cardiovascular:      Rate and Rhythm: Normal rate and regular rhythm.      Heart sounds: Normal heart sounds.   Pulmonary:      Effort: Pulmonary effort is normal.      Breath sounds: Normal breath sounds.   Abdominal:      General: Bowel sounds are normal.      Palpations: Abdomen is soft.      Tenderness: There is no abdominal tenderness.     Musculoskeletal:         General: Normal range of motion.      Cervical back: Normal range of motion and neck supple.      Comments: Right lower extremity decreasing redness swelling and tenderness     Skin:     General: Skin is warm and dry.      Capillary Refill: Capillary refill takes less than 2 seconds.     Neurological:      General: No focal deficit present.      Mental Status: He is alert and oriented to person, place, and time.     Psychiatric:        "  Mood and Affect: Mood normal.            Discussion with Family: will update    Discharge instructions/Information to patient and family:   See after visit summary for information provided to patient and family.      Provisions for Follow-Up Care:  See after visit summary for information related to follow-up care and any pertinent home health orders.      Mobility at time of Discharge:   Basic Mobility Inpatient Raw Score: 24  JH-HLM Goal: 8: Walk 250 feet or more  JH-HLM Achieved: 8: Walk 250 feet ot more  HLM Goal achieved. Continue to encourage appropriate mobility.     Disposition:   Home    Planned Readmission: none    Administrative Statements   Discharge Statement:  I have spent a total time of 35 minutes in caring for this patient on the day of the visit/encounter. .    **Please Note: This note may have been constructed using a voice recognition system**

## 2025-05-29 NOTE — PLAN OF CARE
Problem: PAIN - ADULT  Goal: Verbalizes/displays adequate comfort level or baseline comfort level  Description: Interventions:  - Encourage patient to monitor pain and request assistance  - Assess pain using appropriate pain scale  - Administer analgesics as ordered based on type and severity of pain and evaluate response  - Implement non-pharmacological measures as appropriate and evaluate response  - Consider cultural and social influences on pain and pain management  - Notify physician/advanced practitioner if interventions unsuccessful or patient reports new pain  - Educate patient/family on pain management process including their role and importance of  reporting pain   - Provide non-pharmacologic/complimentary pain relief interventions  5/28/2025 2105 by Elena Do RN  Outcome: Progressing  5/28/2025 1128 by Elena Do, RN  Outcome: Progressing

## 2025-05-30 ENCOUNTER — OFFICE VISIT (OUTPATIENT)
Dept: FAMILY MEDICINE CLINIC | Facility: CLINIC | Age: 45
End: 2025-05-30
Payer: COMMERCIAL

## 2025-05-30 VITALS
HEIGHT: 69 IN | WEIGHT: 315 LBS | DIASTOLIC BLOOD PRESSURE: 78 MMHG | SYSTOLIC BLOOD PRESSURE: 126 MMHG | HEART RATE: 97 BPM | BODY MASS INDEX: 46.65 KG/M2 | OXYGEN SATURATION: 97 %

## 2025-05-30 DIAGNOSIS — L03.115 CELLULITIS OF RIGHT LOWER EXTREMITY: Primary | ICD-10-CM

## 2025-05-30 DIAGNOSIS — I10 ESSENTIAL HYPERTENSION: ICD-10-CM

## 2025-05-30 DIAGNOSIS — F11.20 OPIOID DEPENDENCE, UNCOMPLICATED (HCC): ICD-10-CM

## 2025-05-30 DIAGNOSIS — R60.0 LEG EDEMA: ICD-10-CM

## 2025-05-30 PROCEDURE — 99496 TRANSJ CARE MGMT HIGH F2F 7D: CPT | Performed by: NURSE PRACTITIONER

## 2025-05-30 NOTE — LETTER
May 30, 2025     Patient: Nicolás Balderrama  YOB: 1980  Date of Visit: 5/30/2025      To Whom it May Concern:    Nicolás Balderrama is under my professional care. Nicloás was seen in my office on 5/30/2025. Nicolás may return to work with limitations 06/02/2025, please allow him to be on light duty until further notice and his follow-up with specialty.    If you have any questions or concerns, please don't hesitate to call.         Sincerely,          KELLI Palacios

## 2025-05-30 NOTE — UTILIZATION REVIEW
NOTIFICATION OF ADMISSION DISCHARGE   This is a Notification of Discharge from [facility]. Please be advised that this patient has been discharge from our facility. Below you will find the admission and discharge date and time including the patient’s disposition.   UTILIZATION REVIEW CONTACT:  Utilization Review Assistants  Network Utilization Review Department  Phone: [phone] x carefully listen to the prompts. All voicemails are confidential.  Email: [email or web address]     ADMISSION INFORMATION  PRESENTATION DATE: 5/22/2025  5:41 PM  OBERVATION ADMISSION DATE: N/A  INPATIENT ADMISSION DATE: 5/22/25  9:06 PM   DISCHARGE DATE: 5/29/2025 11:55 AM   DISPOSITION:Home/Self Care    Network Utilization Review Department  ATTENTION: Please call with any questions or concerns to [phone] and carefully listen to the prompts so that you are directed to the right person. All voicemails are confidential.   For Discharge needs, contact Care Management DC Support Team at [phone] opt. 2  Send all requests for admission clinical reviews, approved or denied determinations and any other requests to dedicated fax number below belonging to the campus where the patient is receiving treatment. List of dedicated fax numbers for the Facilities:  FACILITY NAME UR FAX NUMBER   ADMISSION DENIALS (Administrative/Medical Necessity) [phone]   DISCHARGE SUPPORT TEAM ([facility]) [phone]   PARENT CHILD HEALTH (Maternity/NICU/Pediatrics) [phone]   [facility] [phone]   [facility] [phone]   [facility] [phone]   [facility] [phone]   Replaced by [facility] [phone]   [facility] [phone]   [facility] [phone]   [facility] [phone]   [facility]  [facility] [phone]   [facility] [phone]   [facility] [phone]   [facility] [phone]

## 2025-05-30 NOTE — ASSESSMENT & PLAN NOTE
Will begin duricef today - to go back to work on Monday 06/02/2025.   Orders:  •  Ambulatory Referral to Infectious Disease; Future

## 2025-05-30 NOTE — PROGRESS NOTES
Transition of Care Visit:  Name: Nicolás Balderrama      : 1980      MRN: [id number]  Encounter Provider: KELLI Palacios  Encounter Date: 2025   Encounter department: [facility] PRIMARY CARE    Assessment & Plan  Cellulitis of right lower extremity  Will begin duricef today - to go back to work on 2025.   Orders:  •  Ambulatory Referral to Infectious Disease; Future    Leg edema    Orders:  •  Ambulatory Referral to Infectious Disease; Future    Opioid dependence, uncomplicated (HCC)  Pain medication only as needed with flare-ups       BMI 50.0-59.9, adult (HCC)  Stable.        Essential hypertension  BP within range.             History of Present Illness     Transitional Care Management Review:   Nicolás Balderrama is a 45 y.o. male here for TCM follow up.     During the TCM phone call patient stated:  TCM Call (since 2025)     Date and time call was made  2025  2:36 PM    Patient was hospitialized at  [facility]    Date of Admission  25    Date of discharge  25    Diagnosis  Sepsis    Disposition  Home    Were the patients medications reviewed and updated  No      TCM Call (since 2025)     Post hospital issues  None    Scheduled for follow up?  Yes    Did you obtain your prescribed medications  Yes    Do you need help managing your prescriptions or medications  No    Is transportation to your appointment needed  No    I have advised the patient to call PCP with any new or worsening symptoms  Chantal Mcneal MA    Living Arrangements  Spouse or Significiant other    Support System  Spouse        Here for a hospital follow-up.  Hx of right lower leg swelling, redness - hx of cellulitis - was admitted for this for 7 days.  Much noted improvement after iv antibiotics.  He had a virtual visit with infectious disease while inpatient and is to follow-up with them in person.        Review of Systems   Constitutional: Negative.    Skin:          "See H{I   All other systems reviewed and are negative.    Objective   /78 (BP Location: Right arm, Patient Position: Sitting, Cuff Size: Large)   Pulse 97   Ht 5' 9\" (1.753 m)   Wt (!) 173 kg (382 lb)   SpO2 97%   BMI 56.41 kg/m²     Physical Exam    Skin:         Neurological:      General: No focal deficit present.      Mental Status: He is alert and oriented to person, place, and time.       Medications have been reviewed by provider in current encounter      "

## 2025-06-04 ENCOUNTER — TELEPHONE (OUTPATIENT)
Age: 45
End: 2025-06-04

## 2025-06-04 NOTE — TELEPHONE ENCOUNTER
ID Referral      Thank you for referring Nicolás Balderrama,  1980, to [facility] Infectious [facility]. At this time we are closing this referral due to no further Infectious Disease follow up is needed. As per our consultation note, our provider advised your patient:    To consider the use of penicillin prophylaxis for 6-12 months until underlying risk factors can be addressed but the patient declined this due to risk of adverse effects including c diff.  Obtain outpatient dopplers with venous reflux to assess for any venous insufficiency and referral to vascular surgery.   The importance of compliance as an outpatient with lower extremity skin care, management of venous edema with oral diuretics, compression stockings, limb elevation.   Recommend weight loss.  Discussed natural history of cellulitis and discussed with patient that he is at risk for recurrent cellulitis/bacteremia if underlying risk factors cannot be modified.  Risk factor for recurrent infection.  Strongly recommend patient follow-up with bariatric center for lifestyle modification, weight loss.    If your patient develops a need for Infectious Disease in the future, please re-enter a referral.     Thank you again and have a good day.

## 2025-06-09 ENCOUNTER — TELEPHONE (OUTPATIENT)
Age: 45
End: 2025-06-09

## 2025-06-09 ENCOUNTER — RA CDI HCC (OUTPATIENT)
Dept: OTHER | Facility: HOSPITAL | Age: 45
End: 2025-06-09

## 2025-06-09 NOTE — TELEPHONE ENCOUNTER
Patient's wife is aware ID does not feel a referral is necessary at this time. She would like a call back to advise what patient's next step would be as far as specialists go.

## 2025-06-11 DIAGNOSIS — R60.0 BILATERAL LEG EDEMA: ICD-10-CM

## 2025-06-11 DIAGNOSIS — I10 ESSENTIAL HYPERTENSION: ICD-10-CM

## 2025-06-11 DIAGNOSIS — R60.0 LOCALIZED EDEMA: ICD-10-CM

## 2025-06-11 DIAGNOSIS — L03.115 CELLULITIS OF RIGHT LOWER LEG: Primary | ICD-10-CM

## 2025-06-11 DIAGNOSIS — I89.0 LYMPHEDEMA OF RIGHT LOWER EXTREMITY: ICD-10-CM

## 2025-06-11 RX ORDER — SPIRONOLACTONE 25 MG/1
25 TABLET ORAL DAILY
Qty: 90 TABLET | Refills: 1 | Status: SHIPPED | OUTPATIENT
Start: 2025-06-11

## 2025-06-18 ENCOUNTER — OFFICE VISIT (OUTPATIENT)
Dept: FAMILY MEDICINE CLINIC | Facility: CLINIC | Age: 45
End: 2025-06-18
Payer: COMMERCIAL

## 2025-06-18 VITALS
HEIGHT: 69 IN | WEIGHT: 315 LBS | HEART RATE: 83 BPM | RESPIRATION RATE: 16 BRPM | OXYGEN SATURATION: 98 % | BODY MASS INDEX: 46.65 KG/M2 | SYSTOLIC BLOOD PRESSURE: 122 MMHG | DIASTOLIC BLOOD PRESSURE: 84 MMHG

## 2025-06-18 DIAGNOSIS — E66.01 MORBID OBESITY WITH BMI OF 50.0-59.9, ADULT (HCC): ICD-10-CM

## 2025-06-18 DIAGNOSIS — Z00.00 ANNUAL PHYSICAL EXAM: Primary | ICD-10-CM

## 2025-06-18 DIAGNOSIS — I10 ESSENTIAL HYPERTENSION: ICD-10-CM

## 2025-06-18 DIAGNOSIS — R60.0 BILATERAL LEG EDEMA: ICD-10-CM

## 2025-06-18 DIAGNOSIS — R60.0 LOCALIZED EDEMA: ICD-10-CM

## 2025-06-18 DIAGNOSIS — L03.115 CELLULITIS OF RIGHT LOWER LEG: ICD-10-CM

## 2025-06-18 PROBLEM — A41.9 SEPSIS (HCC): Status: RESOLVED | Noted: 2019-12-30 | Resolved: 2025-06-18

## 2025-06-18 PROCEDURE — 99396 PREV VISIT EST AGE 40-64: CPT | Performed by: NURSE PRACTITIONER

## 2025-06-18 RX ORDER — PENICILLIN V POTASSIUM 500 MG/1
500 TABLET, FILM COATED ORAL EVERY 12 HOURS SCHEDULED
Qty: 60 TABLET | Refills: 0 | Status: SHIPPED | OUTPATIENT
Start: 2025-06-18 | End: 2025-07-18

## 2025-06-18 RX ORDER — SACCHAROMYCES BOULARDII 250 MG
250 CAPSULE ORAL 2 TIMES DAILY
Qty: 60 CAPSULE | Refills: 0 | Status: SHIPPED | OUTPATIENT
Start: 2025-06-18 | End: 2025-07-18

## 2025-06-18 NOTE — PROGRESS NOTES
Adult Annual Physical  Name: Nicolás Balderrama      : 1980      MRN: [id number]  Encounter Provider: KELLI Palacios  Encounter Date: 2025   Encounter department: [facility]    :  Assessment & Plan  Annual physical exam         Morbid obesity with BMI of 50.0-59.9, adult (HCC)  Weight has increased more recently with work restrictions and not being as ambulatory.    Bilateral leg edema         Cellulitis of right lower leg  He has been unable to get his PCN filled with his previous pharmacy - will send to [facility].      Will place referral again for infectious diseasse.    Orders:  •  penicillin V potassium (VEETID) 500 mg tablet; Take 1 tablet (500 mg total) by mouth every 12 (twelve) hours  •  saccharomyces boulardii (FLORASTOR) 250 mg capsule; Take 1 capsule (250 mg total) by mouth 2 (two) times a day  •  Ambulatory Referral to Infectious Disease; Future    Localized edema         Essential hypertension  Blood pressure in normal range.                   Immunizations:  - Immunizations due: Tdap         History of Present Illness     Adult Annual Physical:  Patient presents for annual physical. No new issues, he continues with his right lower leg cellulitis - would like to still see infectious disease in regard to the infection - was told by them that he would need to continue with penicillin for 6 months.  .     Diet and Physical Activity:  - Diet/Nutrition: no special diet.  - Exercise: no formal exercise.    General Health:  - Sleep: sleeps well.  - Hearing: normal hearing bilateral ears.  - Vision: wears glasses and no vision problems.  - Dental: brushes teeth twice daily.     Health:  - History of STDs: no.   - Urinary symptoms: none.     Advanced Care Planning:  - Has an advanced directive?: no    - Has a durable medical POA?: no    - ACP document given to patient?: no      Review of Systems   Constitutional: Negative.    Respiratory: Negative.     Cardiovascular:  "Negative.    Skin:  Positive for wound.   Neurological: Negative.    All other systems reviewed and are negative.        Objective   /84 (BP Location: Left arm, Patient Position: Sitting, Cuff Size: Large)   Pulse 83   Resp 16   Ht 5' 9\" (1.753 m)   Wt (!) 173 kg (382 lb 6.4 oz)   SpO2 98%   BMI 56.47 kg/m²     Physical Exam    Cardiovascular:      Rate and Rhythm: Normal rate and regular rhythm.   Pulmonary:      Effort: Pulmonary effort is normal.      Breath sounds: Normal breath sounds.     Skin:         Neurological:      Mental Status: He is alert.         "

## 2025-06-18 NOTE — PATIENT INSTRUCTIONS
"Patient Education     Routine physical for adults   The Basics   Written by the doctors and editors at [facility]   What is a physical? -- A physical is a routine visit, or \"check-up,\" with your doctor. You might also hear it called a \"wellness visit\" or \"preventive visit.\"  During each visit, the doctor will:   Ask about your physical and mental health   Ask about your habits, behaviors, and lifestyle   Do an exam   Give you vaccines if needed   Talk to you about any medicines you take   Give advice about your health   Answer your questions  Getting regular check-ups is an important part of taking care of your health. It can help your doctor find and treat any problems you have. But it's also important for preventing health problems.  A routine physical is different from a \"sick visit.\" A sick visit is when you see a doctor because of a health concern or problem. Since physicals are scheduled ahead of time, you can think about what you want to ask the doctor.  How often should I get a physical? -- It depends on your age and health. In general, for people age 21 years and older:   If you are younger than 50 years, you might be able to get a physical every 3 years.   If you are 50 years or older, your doctor might recommend a physical every year.  If you have an ongoing health condition, like diabetes or high blood pressure, your doctor will probably want to see you more often.  What happens during a physical? -- In general, each visit will include:   Physical exam - The doctor or nurse will check your height, weight, heart rate, and blood pressure. They will also look at your eyes and ears. They will ask about how you are feeling and whether you have any symptoms that bother you.   Medicines - It's a good idea to bring a list of all the medicines you take to each doctor visit. Your doctor will talk to you about your medicines and answer any questions. Tell them if you are having any side effects that bother you. You " "should also tell them if you are having trouble paying for any of your medicines.   Habits and behaviors - This includes:   Your diet   Your exercise habits   Whether you smoke, drink alcohol, or use drugs   Whether you are sexually active   Whether you feel safe at home  Your doctor will talk to you about things you can do to improve your health and lower your risk of health problems. They will also offer help and support. For example, if you want to quit smoking, they can give you advice and might prescribe medicines. If you want to improve your diet or get more physical activity, they can help you with this, too.   Lab tests, if needed - The tests you get will depend on your age and situation. For example, your doctor might want to check your:   Cholesterol   Blood sugar   Iron level   Vaccines - The recommended vaccines will depend on your age, health, and what vaccines you already had. Vaccines are very important because they can prevent certain serious or deadly infections.   Discussion of screening - \"Screening\" means checking for diseases or other health problems before they cause symptoms. Your doctor can recommend screening based on your age, risk, and preferences. This might include tests to check for:   Cancer, such as breast, prostate, cervical, ovarian, colorectal, prostate, lung, or skin cancer   Sexually transmitted infections, such as chlamydia and gonorrhea   Mental health conditions like depression and anxiety  Your doctor will talk to you about the different types of screening tests. They can help you decide which screenings to have. They can also explain what the results might mean.   Answering questions - The physical is a good time to ask the doctor or nurse questions about your health. If needed, they can refer you to other doctors or specialists, too.  Adults older than 65 years often need other care, too. As you get older, your doctor will talk to you about:   How to prevent falling at " home   Hearing or vision tests   Memory testing   How to take your medicines safely   Making sure that you have the help and support you need at home  All topics are updated as new evidence becomes available and our peer review process is complete.  This topic retrieved from [employer] on: May 02, 2024.  Topic 182413 Version 1.0  Release: 32.4.3 - C32.122  © 2024 UpToDate, Inc. and/or its affiliates. All rights reserved.  Consumer Information Use and Disclaimer   Disclaimer: This generalized information is a limited summary of diagnosis, treatment, and/or medication information. It is not meant to be comprehensive and should be used as a tool to help the user understand and/or assess potential diagnostic and treatment options. It does NOT include all information about conditions, treatments, medications, side effects, or risks that may apply to a specific patient. It is not intended to be medical advice or a substitute for the medical advice, diagnosis, or treatment of a health care provider based on the health care provider's examination and assessment of a patient's specific and unique circumstances. Patients must speak with a health care provider for complete information about their health, medical questions, and treatment options, including any risks or benefits regarding use of medications. This information does not endorse any treatments or medications as safe, effective, or approved for treating a specific patient. UpToDate, Inc. and its affiliates disclaim any warranty or liability relating to this information or the use thereof.The use of this information is governed by the Terms of Use, available at https://www.[employer].com/en/know/clinical-effectiveness-terms. 2024© UpToDate, Inc. and its affiliates and/or licensors. All rights reserved.  Copyright   © 2024 UpToDate, Inc. and/or its affiliates. All rights reserved.

## 2025-06-20 ENCOUNTER — TELEPHONE (OUTPATIENT)
Age: 45
End: 2025-06-20

## 2025-06-20 NOTE — TELEPHONE ENCOUNTER
ID Referral      Thank you for referring this patient to infectious disease at this time we are closing referral. Please see previous recommendations by Dr. Contreras. Per chart review pt has appt with Vascular on 8/6. If they feel patient should be followed up with Infectious disease they can place referral.       -Would recommend consideration of penicillin prophylaxis for 6-12 months until underlying risk factors can be addressed but patient currently is declining due to risk of adverse effects including c diff    - As an outpatient would recommend obtaining dopplers with venous reflux to assess for any venous insufficiency and referral to vascular surgery    -Discussed importance of compliance as an outpatient with lower extremity skin care, management of venous edema with oral diuretics, compression stockings, limb elevation    -Recommend weight loss    -Discussed natural history of cellulitis and discussed with patient that he is at risk for recurrent cellulitis/bacteremia if underlying risk factors cannot be modified

## 2025-07-21 ENCOUNTER — TELEPHONE (OUTPATIENT)
Age: 45
End: 2025-07-21

## 2025-07-21 NOTE — TELEPHONE ENCOUNTER
Patient's wife called requesting an antibiotic and pain medication for patient stating that he is having a flare up of his right leg cellulitis. Per wife, patient has been taking cefadroxil 1000 mg three times daily since Friday 7/18 but is running out at home.    Patient is not able to come in for an appointment due to his work schedule this week.  Wife requesting antibiotic and pain medication be sent to [facility].    Please call back to wife Janell to advise. [phone]

## 2025-07-21 NOTE — TELEPHONE ENCOUNTER
Spoke with patients wife, she's very upset as she states patients provider always just sends in an antibiotic for patient and patient cannot just come in when he's working and keep paying all of these copay's when he will just end up in the hospital 2 days later. Wife stated she will wait till tomorrow and speak with patients provider because patient cannot come into office. I tried to explain why we wanted patient seen as we didn't prescribe the antibiotics he is taking and we didn't know where they came from, wife stated they came from the hospital visit in may. Patients wife not agreeable with appointment and refusing to schedule.

## 2025-07-22 DIAGNOSIS — L03.115 CELLULITIS OF RIGHT LOWER LEG: Primary | ICD-10-CM

## 2025-07-22 RX ORDER — PENICILLIN V POTASSIUM 500 MG/1
500 TABLET, FILM COATED ORAL EVERY 6 HOURS SCHEDULED
Qty: 56 TABLET | Refills: 0 | Status: SHIPPED | OUTPATIENT
Start: 2025-07-22 | End: 2025-08-05

## 2025-08-07 ENCOUNTER — TELEPHONE (OUTPATIENT)
Age: 45
End: 2025-08-07

## 2025-08-07 DIAGNOSIS — I10 ESSENTIAL HYPERTENSION: ICD-10-CM

## 2025-08-07 DIAGNOSIS — R60.0 LOCALIZED EDEMA: ICD-10-CM

## 2025-08-07 RX ORDER — LISINOPRIL 40 MG/1
40 TABLET ORAL DAILY
Qty: 90 TABLET | Refills: 1 | Status: SHIPPED | OUTPATIENT
Start: 2025-08-07

## 2025-08-07 RX ORDER — SPIRONOLACTONE 25 MG/1
25 TABLET ORAL DAILY
Qty: 90 TABLET | Refills: 1 | Status: SHIPPED | OUTPATIENT
Start: 2025-08-07

## 2025-08-07 RX ORDER — FUROSEMIDE 40 MG/1
40 TABLET ORAL DAILY
Qty: 90 TABLET | Refills: 1 | Status: SHIPPED | OUTPATIENT
Start: 2025-08-07